# Patient Record
Sex: FEMALE | Race: WHITE | Employment: OTHER | RURAL
[De-identification: names, ages, dates, MRNs, and addresses within clinical notes are randomized per-mention and may not be internally consistent; named-entity substitution may affect disease eponyms.]

---

## 2017-01-03 RX ORDER — GLIPIZIDE 10 MG/1
10 TABLET ORAL 2 TIMES DAILY
Qty: 60 TAB | Refills: 3 | Status: CANCELLED | OUTPATIENT
Start: 2017-01-03

## 2017-01-04 ENCOUNTER — OFFICE VISIT (OUTPATIENT)
Dept: FAMILY MEDICINE CLINIC | Age: 72
End: 2017-01-04

## 2017-01-04 VITALS
OXYGEN SATURATION: 96 % | BODY MASS INDEX: 28.97 KG/M2 | SYSTOLIC BLOOD PRESSURE: 143 MMHG | RESPIRATION RATE: 16 BRPM | DIASTOLIC BLOOD PRESSURE: 70 MMHG | HEART RATE: 41 BPM | TEMPERATURE: 96.8 F | WEIGHT: 157.4 LBS | HEIGHT: 62 IN

## 2017-01-04 DIAGNOSIS — E11.9 TYPE 2 DIABETES MELLITUS WITHOUT COMPLICATION, WITHOUT LONG-TERM CURRENT USE OF INSULIN (HCC): ICD-10-CM

## 2017-01-04 RX ORDER — GLIPIZIDE 10 MG/1
10 TABLET ORAL 2 TIMES DAILY
Qty: 60 TAB | Refills: 3 | Status: SHIPPED | OUTPATIENT
Start: 2017-01-04 | End: 2017-04-04 | Stop reason: SDUPTHER

## 2017-01-04 NOTE — PATIENT INSTRUCTIONS
Diabetes Foot Health: Care Instructions  Your Care Instructions    When you have diabetes, your feet need extra care and attention. Diabetes can damage the nerve endings and blood vessels in your feet, making you less likely to notice when your feet are injured. Diabetes also limits your body's ability to fight infection and get blood to areas that need it. If you get a minor foot injury, it could become an ulcer or a serious infection. With good foot care, you can prevent most of these problems. Caring for your feet can be quick and easy. Most of the care can be done when you are bathing or getting ready for bed. Follow-up care is a key part of your treatment and safety. Be sure to make and go to all appointments, and call your doctor if you are having problems. Its also a good idea to know your test results and keep a list of the medicines you take. How can you care for yourself at home? · Keep your blood sugar close to normal by watching what and how much you eat, monitoring blood sugar, taking medicines if prescribed, and getting regular exercise. · Do not smoke. Smoking affects blood flow and can make foot problems worse. If you need help quitting, talk to your doctor about stop-smoking programs and medicines. These can increase your chances of quitting for good. · Eat a diet that is low in fats. High fat intake can cause fat to build up in your blood vessels and decrease blood flow. · Inspect your feet daily for blisters, cuts, cracks, or sores. If you cannot see well, use a mirror or have someone help you. · Take care of your feet:  [facility] your feet every day. Use warm (not hot) water. Check the water temperature with your wrists or other part of your body, not your feet. ¨ Dry your feet well. Pat them dry. Do not rub the skin on your feet too hard. Dry well between your toes. If the skin on your feet stays moist, bacteria or a fungus can grow, which can lead to infection.   ¨ Keep your skin soft. Use moisturizing skin cream to keep the skin on your feet soft and prevent calluses and cracks. But do not put the cream between your toes, and stop using any cream that causes a rash. ¨ Clean underneath your toenails carefully. Do not use a sharp object to clean underneath your toenails. Use the blunt end of a nail file or other rounded tool. ¨ Trim and file your toenails straight across to prevent ingrown toenails. Use a nail clipper, not scissors. Use an emery board to smooth the edges. · Change socks daily. Socks without seams are best, because seams often rub the feet. You can find socks for people with diabetes from specialty catalogs. · Look inside your shoes every day for things like gravel or torn linings, which could cause blisters or sores. · Buy shoes that fit well:  ¨ Look for shoes that have plenty of space around the toes. This helps prevent bunions and blisters. ¨ Try on shoes while wearing the kind of socks you will usually wear with the shoes. ¨ Avoid plastic shoes. They may rub your feet and cause blisters. Good shoes should be made of materials that are flexible and breathable, such as leather or cloth. ¨ Break in new shoes slowly by wearing them for no more than an hour a day for several days. Take extra time to check your feet for red areas, blisters, or other problems after you wear new shoes. · Do not go barefoot. Do not wear sandals, and do not wear shoes with very thin soles. Thin soles are easy to puncture. They also do not protect your feet from hot pavement or cold weather. · Have your doctor check your feet during each visit. If you have a foot problem, see your doctor. Do not try to treat an early foot problem at home. Home remedies or treatments that you can buy without a prescription (such as corn removers) can be harmful. · Always get early treatment for foot problems. A minor irritation can lead to a major problem if not properly cared for early.   When should you call for help? Call your doctor now or seek immediate medical care if:  · You have a foot sore, an ulcer or break in the skin that is not healing after 4 days, bleeding corns or calluses, or an ingrown toenail. · You have blue or black areas, which can mean bruising or blood flow problems. · You have peeling skin or tiny blisters between your toes or cracking or oozing of the skin. · You have a fever for more than 24 hours and a foot sore. · You have new numbness or tingling in your feet that does not go away after you move your feet or change positions. · You have unexplained or unusual swelling of the foot or ankle. Watch closely for changes in your health, and be sure to contact your doctor if:  · You cannot do proper foot care. Where can you learn more? Go to http://kamaljit-quan.info/. Enter A739 in the search box to learn more about \"Diabetes Foot Health: Care Instructions. \"  Current as of: May 23, 2016  Content Version: 11.1  © 6058-8719 [employer]. Care instructions adapted under license by [employer] (which disclaims liability or warranty for this information). If you have questions about a medical condition or this instruction, always ask your healthcare professional. [address] any warranty or liability for your use of this information. Learning About Diabetes and Your Teeth  How does diabetes affect your teeth and gums? When you have diabetes, managing blood sugar levels and taking good care of your teeth and gums are both important. When blood sugar levels are high, there's a greater risk for:  · Gum (periodontal) disease. · Tooth decay. · Fungal infections in the mouth, like thrush. · Dry mouth, or xerostomia (say \"zee-ruh-STO-cedrick-uh\"). The mouth needs saliva to neutralize the acids in your mouth. These acids can lead to gum disease and tooth decay.   Keeping your blood sugar levels in your target range can help prevent problems with the teeth and gums. If you have any problems with your teeth or gums, see your dentist.  How do you care for your teeth and gums when you have diabetes? · Brush your teeth twice a day. · Floss daily. Make sure to press the floss against your teeth and not your gums. · Check each day for areas where your gums might be red or painful. Be sure to let your dentist know of any sores in your mouth. · See your dentist regularly for professional cleaning of your teeth and to look for gum problems. Many dentists recommend getting checkups twice a year. Remind your dentist that you have diabetes before any work is done. · Don't smoke or use smokeless tobacco. Tobacco use with diabetes can lead to a greater risk of severe gum disease. If you need help quitting, talk to your doctor about stop-smoking programs and medicines. These can increase your chances of quitting for good. Follow-up care is a key part of your treatment and safety. Be sure to make and go to all appointments, and call your doctor if you are having problems. It's also a good idea to know your test results and keep a list of the medicines you take. Where can you learn more? Go to http://kamaljit-quan.info/. Enter O162 in the search box to learn more about \"Learning About Diabetes and Your Teeth. \"  Current as of: May 23, 2016  Content Version: 11.1  © 0073-7898 [employer]. Care instructions adapted under license by [employer] (which disclaims liability or warranty for this information). If you have questions about a medical condition or this instruction, always ask your healthcare professional. [address] any warranty or liability for your use of this information. Type 2 Diabetes: Care Instructions  Your Care Instructions  Type 2 diabetes is a disease that develops when the body's tissues cannot use insulin properly.  Over time, the pancreas cannot make enough insulin. Insulin is a hormone that helps the body's cells use sugar (glucose) for energy. It also helps the body store extra sugar in muscle, fat, and liver cells. Without insulin, the sugar cannot get into the cells to do its work. It stays in the blood instead. This can cause high blood sugar levels. A person has diabetes when the blood sugar stays too high too much of the time. Over time, diabetes can lead to diseases of the heart, blood vessels, nerves, kidneys, and eyes. You may be able to control your blood sugar by losing weight, eating a healthy diet, and getting daily exercise. You may also have to take insulin or other diabetes medicine. Follow-up care is a key part of your treatment and safety. Be sure to make and go to all appointments. Call your doctor if you are having problems. It's also a good idea to know your test results and keep a list of the medicines you take. How can you care for yourself at home? · Keep your blood sugar at a target level (which you set with your doctor). ¨ Eat a good diet that spreads carbohydrate throughout the day. Carbohydrate--the body's main source of fuel--affects blood sugar more than any other nutrient. Carbohydrate is in fruits, vegetables, milk, and yogurt. It also is in breads, cereals, vegetables such as potatoes and corn, and sugary foods such as candy and cakes. ¨ Aim for 30 minutes of exercise on most, preferably all, days of the week. Walking is a good choice. You also may want to do other activities, such as running, swimming, cycling, or playing tennis or team sports. If your doctor says it's okay, do muscle-strengthening exercises at least 2 times a week. ¨ Take your medicines exactly as prescribed. Call your doctor if you think you are having a problem with your medicine. You will get more details on the specific medicines your doctor prescribes. · Check your blood sugar as often as your doctor recommends.  It is important to keep track of any symptoms you have, such as low blood sugar. Also tell your doctor if you have any changes in your activities, diet, or insulin use. · Talk to your doctor before you start taking aspirin every day. Aspirin can help certain people lower their risk of a heart attack or stroke. But taking aspirin isn't right for everyone, because it can cause serious bleeding. · Do not smoke. If you need help quitting, talk to your doctor about stop-smoking programs and medicines. These can increase your chances of quitting for good. · Keep your cholesterol and blood pressure at normal levels. You may need to take one or more medicines to reach your goals. Take them exactly as directed. Do not stop or change a medicine without talking to your doctor first.  When should you call for help? Call 911 anytime you think you may need emergency care. For example, call if:  · You passed out (lost consciousness), or you suddenly become very sleepy or confused. (You may have very low blood sugar.)  Call your doctor now or seek immediate medical care if:  · Your blood sugar is 300 mg/dL or is higher than the level your doctor has set for you. · You have symptoms of low blood sugar, such as:  ¨ Sweating. ¨ Feeling nervous, shaky, and weak. ¨ Extreme hunger and slight nausea. ¨ Dizziness and headache. ¨ Blurred vision. ¨ Confusion. Watch closely for changes in your health, and be sure to contact your doctor if:  · You often have problems controlling your blood sugar. · You have symptoms of long-term diabetes problems, such as:  ¨ New vision changes. ¨ New pain, numbness, or tingling in your hands or feet. ¨ Skin problems. Where can you learn more? Go to http://kamaljit-quan.info/. Enter C553 in the search box to learn more about \"Type 2 Diabetes: Care Instructions. \"  Current as of: May 23, 2016  Content Version: 11.1  © 7679-2023 [employer].  Care instructions adapted under license by [employer] (which disclaims liability or warranty for this information). If you have questions about a medical condition or this instruction, always ask your healthcare professional. [address] any warranty or liability for your use of this information.

## 2017-01-04 NOTE — PROGRESS NOTES
José Miguel Butler is a 73 y.o. female who presents to the office today with the following:  Chief Complaint   Patient presents with    Medication Refill     est care and get refills       HPI   Here requesting refills for Januvia and Glipizide for T2DM, never on insulin. Checks BS twice daily. Reports FBS typically run in 125-150s. Pt states only up to 140-150s when she has a \"cheat\" food, such as bread or pie. Tolerating medications well with no reported SEs or hypoglycemic episodes. Otherwise reports feeling well today with no complaints. Due for A1c and foot exam.    Pt states she is in a hurry and does not have time for anything else. Needs her refills and needs to leave. Agrees to return soon for labs and foot exam.    Declines all vaccines. States she is allergic to \"all of them\". Also says she is never getting another colonoscopy. Does not intend on getting mammogram, DEXA, or any other screenings at all this year. Would like to discuss Opthalmology referral next visit. Review of Systems   Constitutional: Negative for fever, malaise/fatigue and weight loss. Eyes: Negative. Respiratory: Negative for cough and shortness of breath. Cardiovascular: Negative for chest pain and leg swelling. Gastrointestinal: Negative. Genitourinary: Negative. Skin: Negative for rash. Neurological: Negative. Negative for headaches. Allergies   Allergen Reactions    Codeine Hives and Shortness of Breath     Throat closes shut    Keflex [Cephalexin] Hives and Shortness of Breath     Throat closes shut    Pcn [Penicillins] Hives and Shortness of Breath     Throat closes shut    Ciprofloxacin Shortness of Breath    Morphine Angioedema     Throat swells       Current Outpatient Prescriptions   Medication Sig    JANUVIA 100 mg tablet TAKE 1 TABLET BY MOUTH DAILY    glipiZIDE (GLUCOTROL) 10 mg tablet Take 1 Tab by mouth two (2) times a day.     vit Z-N-[id number]-omega-3-ala-dha 250-3-50 unit,mg,unit chew Take  by mouth.  cholecalciferol (VITAMIN D3) 1,000 unit cap Take  by mouth daily.  ferrous sulfate 325 mg (65 mg iron) tablet Take  by mouth Daily (before breakfast).  vitamin E (AQUA GEMS) 400 unit capsule Take  by mouth daily.  ascorbic acid (VITAMIN C) 500 mg tablet Take  by mouth.  clotrimazole (LOTRIMIN) 1 % topical cream Apply  to affected area two (2) times a day. (Patient taking differently: Apply  to affected area two (2) times a day. PRN)    multivitamin (ONE A DAY) tablet Take 1 Tab by mouth daily.  cyanocobalamin (VITAMIN B-12) 1,000 mcg tablet Take 1,000 mcg by mouth daily.  nitrofurantoin, macrocrystal-monohydrate, (MACROBID) 100 mg capsule Take 1 Cap by mouth two (2) times a day.  metFORMIN (GLUCOPHAGE) 1,000 mg tablet TAKE 1 TABLET BY MOUTH TWICE DAILY FOR DIABETIS     No current facility-administered medications for this visit.         Past Medical History   Diagnosis Date    Diabetes ([facility] [address]     Proteinuria        Past Surgical History   Procedure Laterality Date    Hx orthopaedic  1979     back    Hx appendectomy      Hx cholecystectomy      Hx gyn       hysterectomy    Hx colonoscopy  2014     nl, q 2-3 y       Social History     Social History    Marital status: SINGLE     Spouse name: N/A    Number of children: N/A    Years of education: N/A     Social History Main Topics    Smoking status: Current Every Day Smoker     Types: Cigarettes    Smokeless tobacco: Never Used    Alcohol use Yes      Comment: rare    Drug use: No    Sexual activity: Not Currently     Partners: Male     Other Topics Concern    None     Social History Narrative       Family History   Problem Relation Age of Onset   Nolia Stamp COPD Mother     Asthma Mother     Heart Disease Father     Cancer Maternal Grandmother     Stroke Maternal Grandmother     Cancer Maternal Grandfather     Cancer Paternal Grandmother     Heart Disease Paternal Grandfather          Physical Exam:  Visit Vitals    /70 (BP 1 Location: Right arm, BP Patient Position: Sitting)    Pulse (!) 41    Temp 96.8 °F (36 °C) (Temporal)    Resp 16    Ht 5' 2\" (1.575 m)    Wt 157 lb 6.4 oz (71.4 kg)    SpO2 96%    BMI 28.79 kg/m2     Physical Exam   Constitutional: She is oriented to person, place, and time and well-developed, well-nourished, and in no distress. Somewhat disheveled WF. Smells of smoke. HENT:   Head: Normocephalic and atraumatic. Right Ear: External ear normal.   Left Ear: External ear normal.   Nose: Nose normal.   Mouth/Throat: Oropharynx is clear and moist.   Eyes: Conjunctivae and EOM are normal.   Neck: Normal range of motion. Neck supple. Cardiovascular: Regular rhythm, normal heart sounds and intact distal pulses. Bradycardia present. Pulmonary/Chest: Effort normal and breath sounds normal.   Musculoskeletal: She exhibits no edema. Lymphadenopathy:     She has no cervical adenopathy. Neurological: She is alert and oriented to person, place, and time. Gait normal.   Skin: Skin is warm and dry. Psychiatric: Mood and affect normal.   Nursing note and vitals reviewed. Assessment/Plan:    ICD-10-CM ICD-9-CM    1. Type 2 diabetes mellitus without complication, without long-term current use of insulin ([facility]) E11.9 250.00 SITagliptin (JANUVIA) 100 mg tablet      glipiZIDE (GLUCOTROL) 10 mg tablet     Pt to return as soon as able for visit, fasting for labs, foot exam, any other HM due. Cont to monitor BS. Also informed pt of low HR in office. Wanted to recheck in office but pt refused, again in a hurry and says she is not concerned. Encouraged hydration and try to recheck soon. Instructed may stop in clinic to have rechecked if able, even if cannot be seen. Explained potential causes from benign/hydration to life-threatening arrhythmia. Pt verbalizes understanding and states \"im fine don't worry\" and agrees to try to hydrate and recheck.     Follow-up Disposition:  Return in about 1 week (around 1/11/2017) for A1c, fasting labs, full physical.   Pt encouraged to return as soon as she is able. She is overdue for labs. Understands will not be able to continue to provide refills until she returns.     Tam Santos PA-C

## 2017-01-05 ENCOUNTER — CLINICAL SUPPORT (OUTPATIENT)
Dept: FAMILY MEDICINE CLINIC | Age: 72
End: 2017-01-05

## 2017-01-05 ENCOUNTER — TELEPHONE (OUTPATIENT)
Dept: FAMILY MEDICINE CLINIC | Age: 72
End: 2017-01-05

## 2017-01-05 VITALS — HEART RATE: 60 BPM | DIASTOLIC BLOOD PRESSURE: 70 MMHG | SYSTOLIC BLOOD PRESSURE: 150 MMHG

## 2017-01-05 DIAGNOSIS — Z01.30 BLOOD PRESSURE CHECK: Primary | ICD-10-CM

## 2017-01-05 NOTE — PROGRESS NOTES
Chief Complaint   Patient presents with    Blood Pressure Check     nurse visit     Patient stated went home yesterday and she took her own pulse and it was 62.   Anderson Pod, LPN

## 2017-01-05 NOTE — TELEPHONE ENCOUNTER
Per Nikhil Lerner request.  Call patient to see how she is doing. Patient had a low pulse yesterday and would like to know how the patient is doing and did she take her pulse? Tried to call patient. No answer. Left voice message for a return call.   Addie Reis LPN

## 2017-04-04 ENCOUNTER — OFFICE VISIT (OUTPATIENT)
Dept: FAMILY MEDICINE CLINIC | Age: 72
End: 2017-04-04

## 2017-04-04 VITALS
WEIGHT: 162 LBS | DIASTOLIC BLOOD PRESSURE: 50 MMHG | SYSTOLIC BLOOD PRESSURE: 175 MMHG | RESPIRATION RATE: 14 BRPM | BODY MASS INDEX: 29.81 KG/M2 | HEART RATE: 41 BPM | HEIGHT: 62 IN | TEMPERATURE: 97.2 F | OXYGEN SATURATION: 95 %

## 2017-04-04 DIAGNOSIS — I44.30 HEART BLOCK ATRIOVENTRICULAR: Primary | ICD-10-CM

## 2017-04-04 DIAGNOSIS — H53.2 BINOCULAR VISION DISORDER WITH DIPLOPIA: ICD-10-CM

## 2017-04-04 DIAGNOSIS — B35.3 TINEA PEDIS OF BOTH FEET: ICD-10-CM

## 2017-04-04 DIAGNOSIS — R00.1 BRADYCARDIA: ICD-10-CM

## 2017-04-04 DIAGNOSIS — Z13.29 THYROID DISORDER SCREENING: ICD-10-CM

## 2017-04-04 DIAGNOSIS — I10 BENIGN ESSENTIAL HTN: ICD-10-CM

## 2017-04-04 DIAGNOSIS — B35.1 ONYCHOMYCOSIS: ICD-10-CM

## 2017-04-04 DIAGNOSIS — E11.21 TYPE 2 DIABETES MELLITUS WITH DIABETIC NEPHROPATHY, WITHOUT LONG-TERM CURRENT USE OF INSULIN (HCC): ICD-10-CM

## 2017-04-04 RX ORDER — METFORMIN HYDROCHLORIDE 1000 MG/1
TABLET ORAL
Qty: 60 TAB | Refills: 3 | Status: SHIPPED | OUTPATIENT
Start: 2017-04-04 | End: 2017-04-05

## 2017-04-04 RX ORDER — CHLORPHENIRAMINE MALEATE 4 MG
TABLET ORAL 2 TIMES DAILY
Qty: 45 G | Refills: 3 | Status: SHIPPED | OUTPATIENT
Start: 2017-04-04 | End: 2017-07-20 | Stop reason: SDUPTHER

## 2017-04-04 RX ORDER — GLIPIZIDE 10 MG/1
10 TABLET ORAL 2 TIMES DAILY
Qty: 60 TAB | Refills: 3 | Status: SHIPPED | OUTPATIENT
Start: 2017-04-04 | End: 2017-06-08 | Stop reason: SDUPTHER
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
## 2017-04-04 NOTE — LETTER
4/11/2017 1:44 PM 
 
Ms. Luna Bowden [address] [id number] Dear Luna Slaughter: Please find your most recent results below. Resulted Orders HEMOGLOBIN A1C WITH EAG Result Value Ref Range Hemoglobin A1c 12.6 (H) 4.8 - 5.6 % Comment:  
            Pre-diabetes: 5.7 - 6.4 Diabetes: >6.4 Glycemic control for adults with diabetes: <7.0 Estimated average glucose 315 mg/dL Narrative Performed at:  [address]  [id number] : Dian Bird MD, Phone:  [phone] CBC WITH AUTOMATED DIFF Result Value Ref Range WBC 8.1 3.4 - 10.8 x10E3/uL  
 RBC 4.47 3.77 - 5.28 x10E6/uL HGB 13.4 11.1 - 15.9 g/dL HCT 41.0 34.0 - 46.6 % MCV 92 79 - 97 fL  
 MCH 30.0 26.6 - 33.0 pg  
 MCHC 32.7 31.5 - 35.7 g/dL  
 RDW 13.3 12.3 - 15.4 % PLATELET 726 668 - 363 x10E3/uL NEUTROPHILS 62 % Lymphocytes 26 % MONOCYTES 9 % EOSINOPHILS 2 % BASOPHILS 0 %  
 ABS. NEUTROPHILS 5.0 1.4 - 7.0 x10E3/uL Abs Lymphocytes 2.1 0.7 - 3.1 x10E3/uL  
 ABS. MONOCYTES 0.7 0.1 - 0.9 x10E3/uL  
 ABS. EOSINOPHILS 0.2 0.0 - 0.4 x10E3/uL  
 ABS. BASOPHILS 0.0 0.0 - 0.2 x10E3/uL IMMATURE GRANULOCYTES 1 %  
 ABS. IMM. GRANS. 0.1 0.0 - 0.1 x10E3/uL Narrative Performed at:  [address]  [id number] : Dian Bird MD, Phone:  [phone] METABOLIC PANEL, COMPREHENSIVE Result Value Ref Range Glucose 373 (H) 65 - 99 mg/dL BUN 24 8 - 27 mg/dL Creatinine 0.83 0.57 - 1.00 mg/dL GFR est non-AA 72 >59 mL/min/1.73 GFR est AA 83 >59 mL/min/1.73  
 BUN/Creatinine ratio 29 (H) 12 - 28 Comment: **Please note reference interval change** Sodium 140 134 - 144 mmol/L Potassium 4.7 3.5 - 5.2 mmol/L Chloride 102 96 - 106 mmol/L  
 CO2 22 18 - 29 mmol/L Calcium 9.6 8.7 - 10.3 mg/dL Protein, total 6.4 6.0 - 8.5 g/dL Albumin 3.6 3.6 - 4.8 g/dL GLOBULIN, TOTAL 2.8 1.5 - 4.5 g/dL A-G Ratio 1.3 1.2 - 2.2 Comment: **Please note reference interval change** Bilirubin, total <0.2 0.0 - 1.2 mg/dL Alk. phosphatase 90 39 - 117 IU/L  
 AST (SGOT) 8 0 - 40 IU/L  
 ALT (SGPT) 9 0 - 32 IU/L Narrative Performed at:  [address]  [id number] : Rafal Duque MD, Phone:  [phone] TSH 3RD GENERATION Result Value Ref Range TSH 4.420 0.450 - 4.500 uIU/mL Narrative Performed at:  [address]  [id number] : Rafal Duque MD, Phone:  [phone] SUGAR TESTING IS VERY HIGH NEED MONITOR SUGARS THREE TIMES A DAY AND BRING READINGS IN OR CALL THEM IN TO US TO ADJUST MEDICATION BUT CHANGES NEED TO BE MADE TO BRING YOUR SUGARS DOWN. Please call me if you have any questions: [phone] Sincerely, Adolph Daniels PA-C

## 2017-04-04 NOTE — PATIENT INSTRUCTIONS
Heart Blocks: Care Instructions  Your Care Instructions    A heart block is a problem with your heart's electrical system. Normally, a small area of the heart (sinus node) creates the electrical signals that cause the heart to beat in a timed and regular way. A heart block occurs when the signal is blocked. This disrupts the heartbeat. A heart block does not mean that blood flow to the heart is blocked. There are three types of heart blocks. In a first-degree heart block, the signal is slower than normal. But the heart rate is normal, and the heart usually is not damaged. Some medicines may cause a first-degree heart block. In a second-degree heart block, some signals do not reach the lower chambers of the heart. This can cause the heart to skip a beat or have an abnormal rhythm. In a third-degree heart block, the signal is completely blocked from reaching the lower chambers. This can cause the heart to slow down a lot or even stop beating. It is a very serious condition. A first-degree heart block usually does not cause problems and does not need treatment. Second- and third-degree heart blocks can be treated by placing a pacemaker under your skin. The pacemaker is connected to your heart with thin wires. It helps your heart to beat in an even rhythm. Follow-up care is a key part of your treatment and safety. Be sure to make and go to all appointments, and call your doctor if you are having problems. It's also a good idea to know your test results and keep a list of the medicines you take. How can you care for yourself at home? · If you feel lightheaded, sit or lie down to avoid injury that might occur if you faint and fall. · Get plenty of sleep and rest. If you get overtired, your changes in heart rate or rhythm may be more severe or occur more often. · If you received a pacemaker or an implantable cardioverter-defibrillator (ICD), you will get more information about it.   · Wear medical alert jewelry that describes your condition and says you have a pacemaker or ICD. You can buy this at most drugstores. When should you call for help? Call 911 anytime you think you may need emergency care. For example, call if:  · You passed out (lost consciousness). · You have symptoms of a heart attack. These may include:  ¨ Chest pain or pressure, or a strange feeling in the chest.  ¨ Sweating. ¨ Shortness of breath. ¨ Nausea or vomiting. ¨ Pain, pressure, or a strange feeling in the back, neck, jaw, or upper belly or in one or both shoulders or arms. ¨ Lightheadedness or sudden weakness. ¨ A fast or irregular heartbeat. After you call 911, the  may tell you to chew 1 adult-strength or 2 to 4 low-dose aspirin. Wait for an ambulance. Do not try to drive yourself. Call your doctor now or seek immediate medical care if:  · You are dizzy or lightheaded, or you feel like you may faint. · You have new or increased shortness of breath. Watch closely for changes in your health, and be sure to contact your doctor if you have any problems. Where can you learn more? Go to http://kamaljit-quan.info/. Enter I740 in the search box to learn more about \"Heart Blocks: Care Instructions. \"  Current as of: January 27, 2016  Content Version: 11.2  © 0283-3294 [employer]. Care instructions adapted under license by [employer] (which disclaims liability or warranty for this information). If you have questions about a medical condition or this instruction, always ask your healthcare professional. [address] any warranty or liability for your use of this information. Learning About Diabetes Food Guidelines  Your Care Instructions  Meal planning is important to manage diabetes. It helps keep your blood sugar at a target level (which you set with your doctor). You don't have to eat special foods.  You can eat what your family eats, including sweets once in a while. But you do have to pay attention to how often you eat and how much you eat of certain foods. You may want to work with a dietitian or a certified diabetes educator (CDE) to help you plan meals and snacks. A dietitian or CDE can also help you lose weight if that is one of your goals. What should you know about eating carbs? Managing the amount of carbohydrate (carbs) you eat is an important part of healthy meals when you have diabetes. Carbohydrate is found in many foods. · Learn which foods have carbs. And learn the amounts of carbs in different foods. ¨ Bread, cereal, pasta, and rice have about 15 grams of carbs in a serving. A serving is 1 slice of bread (1 ounce), ½ cup of cooked cereal, or 1/3 cup of cooked pasta or rice. ¨ Fruits have 15 grams of carbs in a serving. A serving is 1 small fresh fruit, such as an apple or orange; ½ of a banana; ½ cup of cooked or canned fruit; ½ cup of fruit juice; 1 cup of melon or raspberries; or 2 tablespoons of dried fruit. ¨ Milk and no-sugar-added yogurt have 15 grams of carbs in a serving. A serving is 1 cup of milk or 2/3 cup of no-sugar-added yogurt. ¨ Starchy vegetables have 15 grams of carbs in a serving. A serving is ½ cup of mashed potatoes or sweet potato; 1 cup winter squash; ½ of a small baked potato; ½ cup of cooked beans; or ½ cup cooked corn or green peas. · Learn how much carbs to eat each day and at each meal. A dietitian or CDE can teach you how to keep track of the amount of carbs you eat. This is called carbohydrate counting. · If you are not sure how to count carbohydrate grams, use the Plate Method to plan meals. It is a good, quick way to make sure that you have a balanced meal. It also helps you spread carbs throughout the day. ¨ Divide your plate by types of foods.  Put non-starchy vegetables on half the plate, meat or other protein food on one-quarter of the plate, and a grain or starchy vegetable in the final quarter of the plate. To this you can add a small piece of fruit and 1 cup of milk or yogurt, depending on how many carbs you are supposed to eat at a meal.  · Try to eat about the same amount of carbs at each meal. Do not \"save up\" your daily allowance of carbs to eat at one meal.  · Proteins have very little or no carbs per serving. Examples of proteins are beef, chicken, turkey, fish, eggs, tofu, cheese, cottage cheese, and peanut butter. A serving size of meat is 3 ounces, which is about the size of a deck of cards. Examples of meat substitute serving sizes (equal to 1 ounce of meat) are 1/4 cup of cottage cheese, 1 egg, 1 tablespoon of peanut butter, and ½ cup of tofu. How can you eat out and still eat healthy? · Learn to estimate the serving sizes of foods that have carbohydrate. If you measure food at home, it will be easier to estimate the amount in a serving of restaurant food. · If the meal you order has too much carbohydrate (such as potatoes, corn, or baked beans), ask to have a low-carbohydrate food instead. Ask for a salad or green vegetables. · If you use insulin, check your blood sugar before and after eating out to help you plan how much to eat in the future. · If you eat more carbohydrate at a meal than you had planned, take a walk or do other exercise. This will help lower your blood sugar. What else should you know? · Limit saturated fat, such as the fat from meat and dairy products. This is a healthy choice because people who have diabetes are at higher risk of heart disease. So choose lean cuts of meat and nonfat or low-fat dairy products. Use olive or canola oil instead of butter or shortening when cooking. · Don't skip meals. Your blood sugar may drop too low if you skip meals and take insulin or certain medicines for diabetes. · Check with your doctor before you drink alcohol. Alcohol can cause your blood sugar to drop too low.  Alcohol can also cause a bad reaction if you take certain diabetes medicines. Follow-up care is a key part of your treatment and safety. Be sure to make and go to all appointments, and call your doctor if you are having problems. It's also a good idea to know your test results and keep a list of the medicines you take. Where can you learn more? Go to http://kamaljit-quan.info/. Enter J901 in the search box to learn more about \"Learning About Diabetes Food Guidelines. \"  Current as of: May 23, 2016  Content Version: 11.2  © 4421-3630 [employer]. Care instructions adapted under license by [employer] (which disclaims liability or warranty for this information). If you have questions about a medical condition or this instruction, always ask your healthcare professional. [address] any warranty or liability for your use of this information.

## 2017-04-04 NOTE — MR AVS SNAPSHOT
Visit Information Date & Time Provider Department Dept. Phone Encounter #  
 4/4/2017  2:30 PM Padma Coto PA-C [address] [id number] Upcoming Health Maintenance Date Due  
 GLAUCOMA SCREENING Q2Y 6/20/2012 MICROALBUMIN Q1 6/14/2017 LIPID PANEL Q1 6/14/2017 MEDICARE YEARLY EXAM 6/15/2017 HEMOGLOBIN A1C Q6M 7/4/2017 Pneumococcal 65+ Low/Medium Risk (2 of 2 - PPSV23) 1/4/2018 FOOT EXAM Q1 1/4/2018 BREAST CANCER SCRN MAMMOGRAM 1/4/2019 DTaP/Tdap/Td series (2 - Td) 1/4/2027 COLONOSCOPY 1/4/2027 Allergies as of 4/4/2017  Review Complete On: 4/4/2017 By: Padma Coto PA-C Severity Noted Reaction Type Reactions Codeine High 06/07/2014    Hives, Shortness of Breath Throat closes shut Keflex [Cephalexin] High 06/07/2014    Hives, Shortness of Breath Throat closes shut Pcn [Penicillins] High 06/07/2014    Hives, Shortness of Breath Throat closes shut Ciprofloxacin  06/18/2016    Shortness of Breath Morphine  01/04/2017    Angioedema Throat swells Current Immunizations  Never Reviewed No immunizations on file. Not reviewed this visit You Were Diagnosed With   
  
 Codes Comments Type 2 diabetes mellitus with diabetic nephropathy, without long-term current use of insulin (HCC)    -  Primary ICD-10-CM: E11.21 
ICD-9-CM: 250.40, 583.81 Benign essential HTN     ICD-10-CM: I10 
ICD-9-CM: 401.1 Tinea pedis of both feet     ICD-10-CM: B35.3 ICD-9-CM: 110.4 Onychomycosis     ICD-10-CM: B35.1 ICD-9-CM: 110.1 Bradycardia     ICD-10-CM: R00.1 ICD-9-CM: 427.89 Binocular vision disorder with diplopia     ICD-10-CM: H53.2 ICD-9-CM: 368. 2 Thyroid disorder screening     ICD-10-CM: Z13.29 ICD-9-CM: V77.0 Vitals BP Pulse Temp Resp Height(growth percentile) Weight(growth percentile)  175/50 (BP 1 Location: Right arm, BP Patient Position: Sitting) (!) 41 97.2 °F (36.2 °C) (Temporal) 14 5' 2\" (1.575 m) 162 lb (73.5 kg) SpO2 PF BMI OB Status Smoking Status 95% 41 L/min 29.63 kg/m2 Hysterectomy Current Every Day Smoker BMI and BSA Data Body Mass Index Body Surface Area  
 29.63 kg/m 2 1.79 m 2 Preferred Pharmacy Pharmacy Name Phone [facility]idel[facility] Saleh [address] [place] [address] [address] [phone] Your Updated Medication List  
  
   
This list is accurate as of: 4/4/17  4:18 PM.  Always use your most recent med list.  
  
  
  
  
 ascorbic acid (vitamin C) 500 mg tablet Commonly known as:  VITAMIN C Take  by mouth. cholecalciferol 1,000 unit Cap Commonly known as:  VITAMIN D3 Take  by mouth daily. clotrimazole 1 % topical cream  
Commonly known as:  Mildred Cruza Apply  to affected area two (2) times a day. PRN  
  
 ferrous sulfate 325 mg (65 mg iron) tablet Take  by mouth Daily (before breakfast). glipiZIDE 10 mg tablet Commonly known as:  Flako Sober Take 1 Tab by mouth two (2) times a day. metFORMIN 1,000 mg tablet Commonly known as:  GLUCOPHAGE  
TAKE 1 TABLET BY MOUTH TWICE DAILY FOR DIABETIS  
  
 multivitamin tablet Commonly known as:  ONE A DAY Take 1 Tab by mouth daily. nitrofurantoin (macrocrystal-monohydrate) 100 mg capsule Commonly known as:  MACROBID Take 1 Cap by mouth two (2) times a day. SITagliptin 100 mg tablet Commonly known as:  Freica ck TAKE 1 TABLET BY MOUTH DAILY  
  
 vit W-K-O5-E-omega-3-ala-dha 250-3-50 unit,mg,unit Chew Take  by mouth. VITAMIN B-12 1,000 mcg tablet Generic drug:  cyanocobalamin Take 1,000 mcg by mouth daily. vitamin E 400 unit capsule Commonly known as:  [address] Take  by mouth daily. Prescriptions Sent to Pharmacy Refills  
 clotrimazole (LOTRIMIN) 1 % topical cream 3 Sig: Apply  to affected area two (2) times a day. PRN  Class: Normal  
 Pharmacy: [address] [place] [address] Ph #: [phone] Route: Topical  
 SITagliptin (JANUVIA) 100 mg tablet 3 Sig: TAKE 1 TABLET BY MOUTH DAILY Class: Normal  
 Pharmacy: [address] [place] [address] Ph #: [phone]  
 glipiZIDE (GLUCOTROL) 10 mg tablet 3 Sig: Take 1 Tab by mouth two (2) times a day. Class: Normal  
 Pharmacy: [address] [place] [address] Ph #: [phone] Route: Oral  
 metFORMIN (GLUCOPHAGE) 1,000 mg tablet 3 Sig: TAKE 1 TABLET BY MOUTH TWICE DAILY FOR DIABETIS Class: Normal  
 Pharmacy: [address] [place] [address] Ph #: [phone] We Performed the Following AMB POC EKG ROUTINE W/ 12 LEADS, INTER & REP [05791 CPT(R)] CBC WITH AUTOMATED DIFF [57604 CPT(R)] HEMOGLOBIN A1C WITH EAG [40959 CPT(R)] METABOLIC PANEL, COMPREHENSIVE [08153 CPT(R)] KS COLLECTION VENOUS BLOOD,VENIPUNCTURE [id number] CPT(R)] KS HANDLG&/OR CONVEY OF SPEC FOR TR OFFICE TO LAB [01873 CPT(R)] TSH 3RD GENERATION [07458 CPT(R)] Patient Instructions Heart Blocks: Care Instructions Your Care Instructions A heart block is a problem with your heart's electrical system. Normally, a small area of the heart (sinus node) creates the electrical signals that cause the heart to beat in a timed and regular way. A heart block occurs when the signal is blocked. This disrupts the heartbeat. A heart block does not mean that blood flow to the heart is blocked. There are three types of heart blocks. In a first-degree heart block, the signal is slower than normal. But the heart rate is normal, and the heart usually is not damaged. Some medicines may cause a first-degree heart block.  
In a second-degree heart block, some signals do not reach the lower chambers of the heart. This can cause the heart to skip a beat or have an abnormal rhythm. In a third-degree heart block, the signal is completely blocked from reaching the lower chambers. This can cause the heart to slow down a lot or even stop beating. It is a very serious condition. A first-degree heart block usually does not cause problems and does not need treatment. Second- and third-degree heart blocks can be treated by placing a pacemaker under your skin. The pacemaker is connected to your heart with thin wires. It helps your heart to beat in an even rhythm. Follow-up care is a key part of your treatment and safety. Be sure to make and go to all appointments, and call your doctor if you are having problems. It's also a good idea to know your test results and keep a list of the medicines you take. How can you care for yourself at home? · If you feel lightheaded, sit or lie down to avoid injury that might occur if you faint and fall. · Get plenty of sleep and rest. If you get overtired, your changes in heart rate or rhythm may be more severe or occur more often. · If you received a pacemaker or an implantable cardioverter-defibrillator (ICD), you will get more information about it. · Wear medical alert jewelry that describes your condition and says you have a pacemaker or ICD. You can buy this at most drugsValant Medical Solutionses. When should you call for help? Call 911 anytime you think you may need emergency care. For example, call if: 
· You passed out (lost consciousness). · You have symptoms of a heart attack. These may include: ¨ Chest pain or pressure, or a strange feeling in the chest. 
¨ Sweating. ¨ Shortness of breath. ¨ Nausea or vomiting. ¨ Pain, pressure, or a strange feeling in the back, neck, jaw, or upper belly or in one or both shoulders or arms. ¨ Lightheadedness or sudden weakness. ¨ A fast or irregular heartbeat.  
After you call 911, the  may tell you to chew 1 adult-strength or 2 to 4 low-dose aspirin. Wait for an ambulance. Do not try to drive yourself. Call your doctor now or seek immediate medical care if: 
· You are dizzy or lightheaded, or you feel like you may faint. · You have new or increased shortness of breath. Watch closely for changes in your health, and be sure to contact your doctor if you have any problems. Where can you learn more? Go to http://kamaljit-quan.info/. Enter Y537 in the search box to learn more about \"Heart Blocks: Care Instructions. \" Current as of: January 27, 2016 Content Version: 11.2 © 9106-1997 [employer]. Care instructions adapted under license by [employer] (which disclaims liability or warranty for this information). If you have questions about a medical condition or this instruction, always ask your healthcare professional. [address] any warranty or liability for your use of this information. Learning About Diabetes Food Guidelines Your Care Instructions Meal planning is important to manage diabetes. It helps keep your blood sugar at a target level (which you set with your doctor). You don't have to eat special foods. You can eat what your family eats, including sweets once in a while. But you do have to pay attention to how often you eat and how much you eat of certain foods. You may want to work with a dietitian or a certified diabetes educator (CDE) to help you plan meals and snacks. A dietitian or CDE can also help you lose weight if that is one of your goals. What should you know about eating carbs? Managing the amount of carbohydrate (carbs) you eat is an important part of healthy meals when you have diabetes. Carbohydrate is found in many foods. · Learn which foods have carbs. And learn the amounts of carbs in different foods.  
¨ Bread, cereal, pasta, and rice have about 15 grams of carbs in a serving. A serving is 1 slice of bread (1 ounce), ½ cup of cooked cereal, or 1/3 cup of cooked pasta or rice. ¨ Fruits have 15 grams of carbs in a serving. A serving is 1 small fresh fruit, such as an apple or orange; ½ of a banana; ½ cup of cooked or canned fruit; ½ cup of fruit juice; 1 cup of melon or raspberries; or 2 tablespoons of dried fruit. ¨ Milk and no-sugar-added yogurt have 15 grams of carbs in a serving. A serving is 1 cup of milk or 2/3 cup of no-sugar-added yogurt. ¨ Starchy vegetables have 15 grams of carbs in a serving. A serving is ½ cup of mashed potatoes or sweet potato; 1 cup winter squash; ½ of a small baked potato; ½ cup of cooked beans; or ½ cup cooked corn or green peas. · Learn how much carbs to eat each day and at each meal. A dietitian or CDE can teach you how to keep track of the amount of carbs you eat. This is called carbohydrate counting. · If you are not sure how to count carbohydrate grams, use the Plate Method to plan meals. It is a good, quick way to make sure that you have a balanced meal. It also helps you spread carbs throughout the day. ¨ Divide your plate by types of foods. Put non-starchy vegetables on half the plate, meat or other protein food on one-quarter of the plate, and a grain or starchy vegetable in the final quarter of the plate. To this you can add a small piece of fruit and 1 cup of milk or yogurt, depending on how many carbs you are supposed to eat at a meal. 
· Try to eat about the same amount of carbs at each meal. Do not \"save up\" your daily allowance of carbs to eat at one meal. 
· Proteins have very little or no carbs per serving. Examples of proteins are beef, chicken, turkey, fish, eggs, tofu, cheese, cottage cheese, and peanut butter. A serving size of meat is 3 ounces, which is about the size of a deck of cards.  Examples of meat substitute serving sizes (equal to 1 ounce of meat) are 1/4 cup of cottage cheese, 1 egg, 1 tablespoon of peanut butter, and ½ cup of tofu. How can you eat out and still eat healthy? · Learn to estimate the serving sizes of foods that have carbohydrate. If you measure food at home, it will be easier to estimate the amount in a serving of restaurant food. · If the meal you order has too much carbohydrate (such as potatoes, corn, or baked beans), ask to have a low-carbohydrate food instead. Ask for a salad or green vegetables. · If you use insulin, check your blood sugar before and after eating out to help you plan how much to eat in the future. · If you eat more carbohydrate at a meal than you had planned, take a walk or do other exercise. This will help lower your blood sugar. What else should you know? · Limit saturated fat, such as the fat from meat and dairy products. This is a healthy choice because people who have diabetes are at higher risk of heart disease. So choose lean cuts of meat and nonfat or low-fat dairy products. Use olive or canola oil instead of butter or shortening when cooking. · Don't skip meals. Your blood sugar may drop too low if you skip meals and take insulin or certain medicines for diabetes. · Check with your doctor before you drink alcohol. Alcohol can cause your blood sugar to drop too low. Alcohol can also cause a bad reaction if you take certain diabetes medicines. Follow-up care is a key part of your treatment and safety. Be sure to make and go to all appointments, and call your doctor if you are having problems. It's also a good idea to know your test results and keep a list of the medicines you take. Where can you learn more? Go to http://kamaljit-quan.info/. Enter Y458 in the search box to learn more about \"Learning About Diabetes Food Guidelines. \" Current as of: May 23, 2016 Content Version: 11.2 © 5975-7302 [employer].  Care instructions adapted under license by [employer] (which disclaims liability or warranty for this information). If you have questions about a medical condition or this instruction, always ask your healthcare professional. [address] any warranty or liability for your use of this information. Introducing [facility]! [facility] introduces ObsEva patient portal. Now you can access parts of your medical record, email your doctor's office, and request medication refills online. 1. In your internet browser, go to [email or web address] 2. Click on the First Time User? Click Here link in the Sign In box. You will see the New Member Sign Up page. 3. Enter your ObsEva Access Code exactly as it appears below. You will not need to use this code after youve completed the sign-up process. If you do not sign up before the expiration date, you must request a new code. · ObsEva Access Code: [email or web address] Expires: 4/4/2017  4:40 PM 
 
4. Enter the last four digits of your Social Security Number (xxxx) and Date of Birth (mm/dd/yyyy) as indicated and click Submit. You will be taken to the next sign-up page. 5. Create a ObsEva ID. This will be your ObsEva login ID and cannot be changed, so think of one that is secure and easy to remember. 6. Create a ObsEva password. You can change your password at any time. 7. Enter your Password Reset Question and Answer. This can be used at a later time if you forget your password. 8. Enter your e-mail address. You will receive e-mail notification when new information is available in [address] 9. Click Sign Up. You can now view and download portions of your medical record. 10. Click the Download Summary menu link to download a portable copy of your medical information. If you have questions, please visit the Frequently Asked Questions section of the ObsEva website. Remember, ObsEva is NOT to be used for urgent needs. For medical emergencies, dial 911. Now available from your iPhone and Android! Please provide this summary of care documentation to your next provider. Your primary care clinician is listed as Stephanie Love. If you have any questions after today's visit, please call [phone].

## 2017-04-05 ENCOUNTER — APPOINTMENT (OUTPATIENT)
Dept: MRI IMAGING | Age: 72
End: 2017-04-05
Attending: EMERGENCY MEDICINE
Payer: MEDICARE

## 2017-04-05 ENCOUNTER — HOSPITAL ENCOUNTER (OUTPATIENT)
Age: 72
Setting detail: OBSERVATION
Discharge: HOME | End: 2017-04-06
Attending: EMERGENCY MEDICINE | Admitting: INTERNAL MEDICINE
Payer: MEDICARE

## 2017-04-05 DIAGNOSIS — I44.1 MOBITZ (TYPE) II ATRIOVENTRICULAR BLOCK: Primary | ICD-10-CM

## 2017-04-05 PROBLEM — Z72.0 TOBACCO ABUSE: Status: ACTIVE | Noted: 2017-04-05

## 2017-04-05 PROBLEM — H53.2 DIPLOPIA: Status: ACTIVE | Noted: 2017-04-05

## 2017-04-05 LAB
ALBUMIN SERPL-MCNC: 3.6 G/DL (ref 3.6–4.8)
ALBUMIN/GLOB SERPL: 1.3 {RATIO} (ref 1.2–2.2)
ALP SERPL-CCNC: 90 IU/L (ref 39–117)
ALT SERPL-CCNC: 9 IU/L (ref 0–32)
AST SERPL-CCNC: 8 IU/L (ref 0–40)
ATRIAL RATE: 43 BPM
BASOPHILS # BLD AUTO: 0 X10E3/UL (ref 0–0.2)
BASOPHILS NFR BLD AUTO: 0 %
BILIRUB SERPL-MCNC: <0.2 MG/DL (ref 0–1.2)
BUN SERPL-MCNC: 24 MG/DL (ref 8–27)
BUN/CREAT SERPL: 29 (ref 12–28)
CALCIUM SERPL-MCNC: 9.6 MG/DL (ref 8.7–10.3)
CALCULATED P AXIS, ECG09: 78 DEGREES
CALCULATED R AXIS, ECG10: 88 DEGREES
CALCULATED T AXIS, ECG11: 57 DEGREES
CHLORIDE SERPL-SCNC: 102 MMOL/L (ref 96–106)
CO2 SERPL-SCNC: 22 MMOL/L (ref 18–29)
CREAT SERPL-MCNC: 0.83 MG/DL (ref 0.57–1)
DIAGNOSIS, 93000: NORMAL
EOSINOPHIL # BLD AUTO: 0.2 X10E3/UL (ref 0–0.4)
EOSINOPHIL NFR BLD AUTO: 2 %
ERYTHROCYTE [DISTWIDTH] IN BLOOD BY AUTOMATED COUNT: 13.3 % (ref 12.3–15.4)
EST. AVERAGE GLUCOSE BLD GHB EST-MCNC: 315 MG/DL
GLOBULIN SER CALC-MCNC: 2.8 G/DL (ref 1.5–4.5)
GLUCOSE BLD STRIP.AUTO-MCNC: 303 MG/DL (ref 65–100)
GLUCOSE SERPL-MCNC: 373 MG/DL (ref 65–99)
HBA1C MFR BLD: 12.6 % (ref 4.8–5.6)
HCT VFR BLD AUTO: 41 % (ref 34–46.6)
HGB BLD-MCNC: 13.4 G/DL (ref 11.1–15.9)
IMM GRANULOCYTES # BLD: 0.1 X10E3/UL (ref 0–0.1)
IMM GRANULOCYTES NFR BLD: 1 %
LYMPHOCYTES # BLD AUTO: 2.1 X10E3/UL (ref 0.7–3.1)
LYMPHOCYTES NFR BLD AUTO: 26 %
MCH RBC QN AUTO: 30 PG (ref 26.6–33)
MCHC RBC AUTO-ENTMCNC: 32.7 G/DL (ref 31.5–35.7)
MCV RBC AUTO: 92 FL (ref 79–97)
MONOCYTES # BLD AUTO: 0.7 X10E3/UL (ref 0.1–0.9)
MONOCYTES NFR BLD AUTO: 9 %
NEUTROPHILS # BLD AUTO: 5 X10E3/UL (ref 1.4–7)
NEUTROPHILS NFR BLD AUTO: 62 %
P-R INTERVAL, ECG05: 172 MS
PLATELET # BLD AUTO: 250 X10E3/UL (ref 150–379)
POTASSIUM SERPL-SCNC: 4.7 MMOL/L (ref 3.5–5.2)
PROT SERPL-MCNC: 6.4 G/DL (ref 6–8.5)
Q-T INTERVAL, ECG07: 508 MS
QRS DURATION, ECG06: 128 MS
QTC CALCULATION (BEZET), ECG08: 429 MS
RBC # BLD AUTO: 4.47 X10E6/UL (ref 3.77–5.28)
SERVICE CMNT-IMP: ABNORMAL
SODIUM SERPL-SCNC: 140 MMOL/L (ref 134–144)
TSH SERPL DL<=0.005 MIU/L-ACNC: 4.42 UIU/ML (ref 0.45–4.5)
VENTRICULAR RATE, ECG03: 43 BPM
WBC # BLD AUTO: 8.1 X10E3/UL (ref 3.4–10.8)

## 2017-04-05 PROCEDURE — 70551 MRI BRAIN STEM W/O DYE: CPT

## 2017-04-05 PROCEDURE — 74011250637 HC RX REV CODE- 250/637: Performed by: NURSE PRACTITIONER

## 2017-04-05 PROCEDURE — 99218 HC RM OBSERVATION: CPT

## 2017-04-05 PROCEDURE — 82962 GLUCOSE BLOOD TEST: CPT

## 2017-04-05 PROCEDURE — 93005 ELECTROCARDIOGRAM TRACING: CPT

## 2017-04-05 PROCEDURE — 94762 N-INVAS EAR/PLS OXIMTRY CONT: CPT

## 2017-04-05 PROCEDURE — 99284 EMERGENCY DEPT VISIT MOD MDM: CPT

## 2017-04-05 RX ORDER — SODIUM CHLORIDE 0.9 % (FLUSH) 0.9 %
5-10 SYRINGE (ML) INJECTION EVERY 8 HOURS
Status: DISCONTINUED | OUTPATIENT
Start: 2017-04-05 | End: 2017-04-06 | Stop reason: HOSPADM

## 2017-04-05 RX ORDER — MAGNESIUM SULFATE 100 %
16 CRYSTALS MISCELLANEOUS AS NEEDED
Status: DISCONTINUED | OUTPATIENT
Start: 2017-04-05 | End: 2017-04-06 | Stop reason: HOSPADM

## 2017-04-05 RX ORDER — ACETAMINOPHEN 325 MG/1
650 TABLET ORAL
Status: DISCONTINUED | OUTPATIENT
Start: 2017-04-05 | End: 2017-04-06 | Stop reason: HOSPADM

## 2017-04-05 RX ORDER — DEXTROSE 50 % IN WATER (D50W) INTRAVENOUS SYRINGE
25 AS NEEDED
Status: DISCONTINUED | OUTPATIENT
Start: 2017-04-05 | End: 2017-04-06 | Stop reason: HOSPADM

## 2017-04-05 RX ORDER — CLINDAMYCIN PHOSPHATE 900 MG/50ML
900 INJECTION INTRAVENOUS
Status: DISCONTINUED | OUTPATIENT
Start: 2017-04-05 | End: 2017-04-06 | Stop reason: HOSPADM

## 2017-04-05 RX ORDER — DEXTROSE 25 % IN WATER 25 %
2.5 SYRINGE (ML) INTRAVENOUS AS NEEDED
Status: DISCONTINUED | OUTPATIENT
Start: 2017-04-05 | End: 2017-04-06 | Stop reason: HOSPADM

## 2017-04-05 RX ORDER — GLIPIZIDE 5 MG/1
10 TABLET ORAL 2 TIMES DAILY
Status: DISCONTINUED | OUTPATIENT
Start: 2017-04-05 | End: 2017-04-06 | Stop reason: HOSPADM

## 2017-04-05 RX ORDER — LANOLIN ALCOHOL/MO/W.PET/CERES
325 CREAM (GRAM) TOPICAL
Status: DISCONTINUED | OUTPATIENT
Start: 2017-04-06 | End: 2017-04-06 | Stop reason: HOSPADM

## 2017-04-05 RX ORDER — SODIUM CHLORIDE 0.9 % (FLUSH) 0.9 %
5-10 SYRINGE (ML) INJECTION AS NEEDED
Status: DISCONTINUED | OUTPATIENT
Start: 2017-04-05 | End: 2017-04-06 | Stop reason: HOSPADM

## 2017-04-05 RX ORDER — CHLORPHENIRAMINE MALEATE 4 MG
TABLET ORAL 2 TIMES DAILY
Status: DISCONTINUED | OUTPATIENT
Start: 2017-04-05 | End: 2017-04-06 | Stop reason: HOSPADM

## 2017-04-05 RX ORDER — CLINDAMYCIN PHOSPHATE 600 MG/50ML
600 INJECTION INTRAVENOUS
Status: DISCONTINUED | OUTPATIENT
Start: 2017-04-06 | End: 2017-04-05

## 2017-04-05 RX ADMIN — Medication 5 ML: at 22:00

## 2017-04-05 RX ADMIN — GLIPIZIDE 10 MG: 5 TABLET ORAL at 19:54

## 2017-04-05 NOTE — CONSULTS
Subjective:                [address], [place], [address] [facility]  [phone]    Date of  Admission: 4/5/2017 11:22 AM     Admission type:Emergency    Sd Donaldson is a 73 y.o. female sent to the ER for bradycardia. She was found to be in 2:1 heart block and I was asked to see her. She complains of double vision over the last two weeks. She has some increased sob. Patient Active Problem List    Diagnosis Date Noted    Mobitz (type) II atrioventricular block 04/05/2017    Tobacco abuse 04/05/2017    Benign essential HTN 06/14/2016    Vitamin D deficiency 06/14/2016    Fe deficiency anemia 06/14/2016    Proteinuria     [facility]       Malena Spicer PA-C  Past Medical History:   Diagnosis Date    Diabetes [facility])     Mobitz (type) II atrioventricular block 4/5/2017    Proteinuria     Tobacco abuse 4/5/2017      Past Surgical History:   Procedure Laterality Date    HX APPENDECTOMY      HX CHOLECYSTECTOMY      HX COLONOSCOPY  2014    nl, q 2-3 y    HX GYN      hysterectomy    HX ORTHOPAEDIC  1979    back     Allergies   Allergen Reactions    Codeine Hives and Shortness of Breath     Throat closes shut    Keflex [Cephalexin] Hives and Shortness of Breath     Throat closes shut    Pcn [Penicillins] Hives and Shortness of Breath     Throat closes shut    Ciprofloxacin Shortness of Breath    Morphine Angioedema     Throat swells     Social History   Substance Use Topics    Smoking status: Current Every Day Smoker     Packs/day: 1.00     Types: Cigarettes    Smokeless tobacco: Never Used    Alcohol use Yes      Comment: rare     Family History   Problem Relation Age of Onset    COPD Mother     Asthma Mother     Heart Disease Father     Cancer Maternal Grandmother     Stroke Maternal Grandmother     Cancer Maternal Grandfather     Cancer Paternal Grandmother     Heart Disease Paternal Grandfather       No current facility-administered medications for this encounter. Current Outpatient Prescriptions   Medication Sig    SITagliptin (JANUVIA) 100 mg tablet TAKE 1 TABLET BY MOUTH DAILY    glipiZIDE (GLUCOTROL) 10 mg tablet Take 1 Tab by mouth two (2) times a day.  clotrimazole (LOTRIMIN) 1 % topical cream Apply  to affected area two (2) times a day. PRN    vit O-M-Z4-E-omega-3-ala-dha 250-3-50 unit,mg,unit chew Take  by mouth.  cholecalciferol (VITAMIN D3) 1,000 unit cap Take  by mouth daily.  ferrous sulfate 325 mg (65 mg iron) tablet Take  by mouth Daily (before breakfast).  vitamin E (AQUA GEMS) 400 unit capsule Take  by mouth daily.  ascorbic acid (VITAMIN C) 500 mg tablet Take  by mouth.  multivitamin (ONE A DAY) tablet Take 1 Tab by mouth daily.  cyanocobalamin (VITAMIN B-12) 1,000 mcg tablet Take 1,000 mcg by mouth daily. Review of Symptoms:  Constitutional: negative  Eyes: double vision  Ears, nose, mouth, throat, and face: negative  Respiratory: sob  Cardiovascular: negative  Gastrointestinal: negative  Genitourinary: negative  Musculoskeletal: negative  Neurological: negative  Behvioral/Psych: negative  Endocrine: negative     Subjective:      Visit Vitals    BP (!) 129/99    Pulse (!) 48    Temp 98 °F (36.7 °C)    Resp 16    Ht 5' 3\" (1.6 m)    Wt 160 lb 7.9 oz (72.8 kg)    SpO2 95%    BMI 28.43 kg/m2       Physical Exam  Abdomen: soft, non-tender.    Extremities: extremities normal  Heart: regular and nahomi  Lungs: clear to auscultation bilaterally  Pulses: 2+ and symmetric    Cardiographics    Telemetry: 2:1 HB    ECG: nsr with 2:1 hb, rbbb    Echocardiogram: pending    Labs:  Recent Labs      04/04/17   1900  04/04/17   1536   WBC  8.3  8.1   HGB  12.6  13.4   HCT  36.4  41.0   PLT  229  250     Recent Labs      04/04/17   1900  04/04/17   1536   NA  143  140   K  4.5  4.7   CL  105  102   CO2  29  22   GLU  285*  373*   BUN  25*  24   CREA  0.93  0.83   CA  9.4  9.6   MG  1.7   --    ALB  3.3*  3.6   TBILI  0.2  <0.2   SGOT 11*  8   ALT  15  9       Recent Labs      04/04/17   1900   TROIQ  <0.04   CPK  36   CKMB  <1.0       No intake or output data in the 24 hours ending 04/05/17 1449      Assessment:     Assessment:       Active Problems:    Diabetes ([address] ()      Mobitz (type) II atrioventricular block (4/5/2017)      Tobacco abuse (4/5/2017)         Plan:     Otto Mina is a pleasant lady with 2:1 heart block. She is not on any av sd agents. She is a candidate for a dual chamber pacemaker. I discussed the risks/benefits/alternatives of the procedure with the patient. Risks include (but are not limited to) bleeding, heart block, infection, cva/mi/tamponade/death. The patient understands and agrees to proceed. Thank you for this interesting consultation.         Tesha Nobles MD, [facility]    4/5/2017

## 2017-04-05 NOTE — H&P
[address] [place] [address] [facility]  [phone]          CARDIOLOGY HISTORY AND PHYSICAL    Date of  Admission: 4/5/2017 11:22 AM     Admission type:Emergency     Subjective:      Yumiko Talavera is a 73 y.o. female admitted for 2nd degree Mobitz II HB. Seen in PCP office yesterday for c/o diplopia that started 3-4 weeks ago. During assessment HR noted at 41, ECG completed showing 2nd degree HB. Referred pt to [id number] [place] [address] yesterday, came today. Patient has no symptoms of bradycardia. Denies CP, SOB, dizziness/lightheadedness, syncope, palpitations, weakness, lethargy. Only c/o is the diplopia. No previous cardiac hx, +DM, tob abuse.   Cardiac risk factors: smoking/ tobacco exposure, diabetes mellitus, post-menopausal.    Patient Active Problem List    Diagnosis Date Noted    Second degree AV block 04/05/2017    Tobacco abuse 04/05/2017    Diplopia 04/05/2017    Benign essential HTN 06/14/2016    Vitamin D deficiency 06/14/2016    Fe deficiency anemia 06/14/2016    Proteinuria     Diabetes ([address]       Diane Casanova PA-C  Past Medical History:   Diagnosis Date    Diabetes ([address]     Diplopia 4/5/2017    Mobitz (type) II atrioventricular block 4/5/2017    Proteinuria     Tobacco abuse 4/5/2017      Social History     Social History    Marital status: SINGLE     Spouse name: N/A    Number of children: N/A    Years of education: N/A     Social History Main Topics    Smoking status: Current Every Day Smoker     Packs/day: 1.00     Types: Cigarettes    Smokeless tobacco: Never Used    Alcohol use Yes      Comment: rare    Drug use: No    Sexual activity: Not Currently     Partners: Male     Other Topics Concern    None     Social History Narrative     Allergies   Allergen Reactions    Codeine Hives and Shortness of Breath     Throat closes shut    Keflex [Cephalexin] Hives and Shortness of Breath     Throat closes shut    Pcn [Penicillins] Hives and Shortness of Breath Throat closes shut    Ciprofloxacin Shortness of Breath    Morphine Angioedema     Throat swells      Family History   Problem Relation Age of Onset    COPD Mother     Asthma Mother     Heart Disease Father     Cancer Maternal Grandmother     Stroke Maternal Grandmother     Cancer Maternal Grandfather     Cancer Paternal Grandmother     Heart Disease Paternal Grandfather       Current Facility-Administered Medications   Medication Dose Route Frequency    sodium chloride (NS) flush 5-10 mL  5-10 mL IntraVENous Q8H    sodium chloride (NS) flush 5-10 mL  5-10 mL IntraVENous PRN    acetaminophen (TYLENOL) tablet 650 mg  650 mg Oral Q4H PRN    clindamycin (CLEOCIN) 900mg D5W 50mL IVPB (premix)  900 mg IntraVENous ON CALL     Current Outpatient Prescriptions   Medication Sig    SITagliptin (JANUVIA) 100 mg tablet TAKE 1 TABLET BY MOUTH DAILY    glipiZIDE (GLUCOTROL) 10 mg tablet Take 1 Tab by mouth two (2) times a day.  clotrimazole (LOTRIMIN) 1 % topical cream Apply  to affected area two (2) times a day. PRN    vit S-D-N0-E-omega-3-ala-dha 250-3-50 unit,mg,unit chew Take  by mouth.  cholecalciferol (VITAMIN D3) 1,000 unit cap Take  by mouth daily.  ferrous sulfate 325 mg (65 mg iron) tablet Take  by mouth Daily (before breakfast).  vitamin E (AQUA GEMS) 400 unit capsule Take  by mouth daily.  ascorbic acid (VITAMIN C) 500 mg tablet Take  by mouth.  multivitamin (ONE A DAY) tablet Take 1 Tab by mouth daily.  cyanocobalamin (VITAMIN B-12) 1,000 mcg tablet Take 1,000 mcg by mouth daily.          Review of Symptoms:   Constitutional: negative  Eyes: negative  Ears, nose, mouth, throat, and face: blurred/double vision x 2-4 weeks  Respiratory: negative  Cardiovascular: negative  Gastrointestinal: negative  Genitourinary:negative  Musculoskeletal:negative  Neurological: negative  Behvioral/Psych: negative  Endocrine: negative     Objective:   Physical Exam:  General Appearance:  WNWD older looking than stated age  female in no acute distress  Chest:   Clear  Cardiovascular:  nahomi, 2/6 SONIA murmur, right carotid murmur vs stenosis  Abdomen:   Soft, non-tender, bowel sounds are active.   Extremities: no peripheral edema, +DP/PT  Skin:  Warm and dry, china ankles and feet with \"rash\", errhythemic, papular - patient states she gets every year and treats with topical solutions    Visit Vitals    BP (!) 129/99    Pulse (!) 48    Temp 98 °F (36.7 °C)    Resp 16    Ht 5' 3\" (1.6 m)    Wt 160 lb 7.9 oz (72.8 kg)    SpO2 95%    BMI 28.43 kg/m2       Cardiographics    Telemetry: SB 2nd degree HB 2:1  ECnd degree HB Mobitz 2,   Echocardiogram: pending    Labs:  Recent Labs      17   1900  17   1536   WBC  8.3  8.1   HGB  12.6  13.4   HCT  36.4  41.0   PLT  229  250     Recent Labs      17   1900  17   1536   NA  143  140   K  4.5  4.7   CL  105  102   CO2  29  22   GLU  285*  373*   BUN  25*  24   CREA  0.93  0.83   CA  9.4  9.6   MG  1.7   --    ALB  3.3*  3.6   TBILI  0.2  <0.2   SGOT  11*  8   ALT  15  9       Recent Labs      17   1900   TROIQ  <0.04   CPK  36   CKMB  <1.0           Assessment:       Active Problems:    Diabetes (HCC) ()      Second degree AV block (2017)      Tobacco abuse (2017)      Diplopia (2017)         Plan:     2:1 AV block. Even though \"asymptomatic\", pacemaker is indicated with HB. She is not and has not been on any AV sd blocking agents that could contribute. The patient is agreeable to proceed with pacemaker implant likely in AM.  Echo pending. Dr. Dana Clark consulted. Diplopia:  Not an acute onset, MRI today to evaluate for possible old CVA? ? Follow up with PCP, likely will need Opthamology and/or Neurology. DM:  Continue on current medications  HgbA1c 12. Refer back to PCP for further management. Pt seen and examined in details. Agree with NP A&P. D/w pt.      Linda Tilley MD

## 2017-04-05 NOTE — ED PROVIDER NOTES
HPI Comments: Maria Esther Swan is a 73 y.o. female with hx DM, who presents ambulatory to the ED c/o bradycardia x yesterday. She was seen by her PCP yesterday for diplopia. An EKG at that visit showed heart block, and she was sent to [facility] for admission and pacemaker placement. She refused admission, preferring to be seen at [facility] the following morning. She has never been evaluated by cardiologist. She has a hx of poorly controlled DM, with labs yesterday showing HbA1c 12.6, and TSH 5.54. She specifically denies chest pain, SOB, abdominal pain, palpitations. PCP: Tito Romero PA-C  Soc Hx: +tobacco, +EtOH    There are no other complaints, changes or physical findings at this time. The history is provided by the patient. Past Medical History:   Diagnosis Date    Diabetes ([address]     Proteinuria        Past Surgical History:   Procedure Laterality Date    HX APPENDECTOMY      HX CHOLECYSTECTOMY      HX COLONOSCOPY  2014    nl, q 2-3 y    HX GYN      hysterectomy    HX ORTHOPAEDIC  1979    back         Family History:   Problem Relation Age of Onset    COPD Mother     Asthma Mother     Heart Disease Father     Cancer Maternal Grandmother     Stroke Maternal Grandmother     Cancer Maternal Grandfather     Cancer Paternal Grandmother     Heart Disease Paternal Grandfather        Social History     Social History    Marital status: SINGLE     Spouse name: N/A    Number of children: N/A    Years of education: N/A     Occupational History    Not on file. Social History Main Topics    Smoking status: Current Every Day Smoker     Packs/day: 1.00     Types: Cigarettes    Smokeless tobacco: Never Used    Alcohol use Yes      Comment: rare    Drug use: No    Sexual activity: Not Currently     Partners: Male     Other Topics Concern    Not on file     Social History Narrative         ALLERGIES: Codeine; Keflex [cephalexin]; Pcn [penicillins];  Ciprofloxacin; and Morphine    Review of Systems   Constitutional: Negative for chills, diaphoresis, fever and unexpected weight change. HENT: Negative for rhinorrhea and sore throat. Eyes: Positive for visual disturbance. Negative for pain. Respiratory: Negative for shortness of breath, wheezing and stridor. Cardiovascular: Negative for chest pain, palpitations and leg swelling. +bradycardia   Gastrointestinal: Negative for abdominal pain, blood in stool, diarrhea, nausea and vomiting. Genitourinary: Negative for difficulty urinating, dysuria and flank pain. Musculoskeletal: Negative for back pain and neck stiffness. Skin: Negative for rash. Neurological: Negative for seizures, syncope, weakness, light-headedness and headaches. Psychiatric/Behavioral: Negative for confusion. All other systems reviewed and are negative. Patient Vitals for the past 12 hrs:   Temp Pulse Resp BP SpO2   04/05/17 1048 98 °F (36.7 °C) (!) 46 12 169/63 99 %            Physical Exam   Constitutional: She is oriented to person, place, and time. She appears well-developed and well-nourished. No distress. HENT:   Nose: Nose normal.   Mouth/Throat: Oropharynx is clear and moist. No oropharyngeal exudate. Eyes: Conjunctivae and EOM are normal. Pupils are equal, round, and reactive to light. Right eye exhibits no discharge. Left eye exhibits no discharge. No scleral icterus. No EOM palsy noted   Neck: Normal range of motion. Neck supple. No JVD present. Cardiovascular: Normal rate, regular rhythm, normal heart sounds and intact distal pulses. No murmur heard. Pulmonary/Chest: Effort normal and breath sounds normal. No stridor. No respiratory distress. She has no wheezes. She has no rales. Abdominal: Soft. Bowel sounds are normal. She exhibits no distension. There is no tenderness. There is no rebound and no guarding. Musculoskeletal: She exhibits no edema or tenderness.    Neurological: She is alert and oriented to person, place, and time. Skin: Skin is warm and dry. No rash noted. She is not diaphoretic. Psychiatric: She has a normal mood and affect. Nursing note and vitals reviewed. MDM  Number of Diagnoses or Management Options  Mobitz (type) II atrioventricular block:      Amount and/or Complexity of Data Reviewed  Tests in the radiology section of CPT®: ordered and reviewed  Tests in the medicine section of CPT®: ordered and reviewed  Review and summarize past medical records: yes  Discuss the patient with other providers: yes (Cardiology)  Independent visualization of images, tracings, or specimens: yes      ED Course       Procedures    Consult Note:  12:17 PM  Guille Hatch spoke with Dr. Werner Senior  Specialty: Cardiology  Discussed pts hx, disposition, and available diagnostic and imaging results. Reviewed care plans. Consultant agrees with plans as outlined. He will come to ED to evaluate pt. CONSULT NOTE:   12:21 PM  Rhonda Ritchie MD spoke with Hui Montoya RN,  Specialty: Cardiology  Discussed pt's hx, disposition, and available diagnostic and imaging results. Reviewed care plans. Consultant agrees with plans as outlined. She will see the pt in the ED.   Written by Bibi Rodriguez ED Scribe, as dictated by Rhonda Ritchie MD.    LABORATORY TESTS:  Recent Results (from the past 12 hour(s))   EKG, 12 LEAD, INITIAL    Collection Time: 04/05/17 10:52 AM   Result Value Ref Range    Ventricular Rate 43 BPM    Atrial Rate 43 BPM    P-R Interval 172 ms    QRS Duration 128 ms    Q-T Interval 508 ms    QTC Calculation (Bezet) 429 ms    Calculated P Axis 78 degrees    Calculated R Axis 88 degrees    Calculated T Axis 57 degrees    Diagnosis       Sinus rhythm  with 2nd degree AV block with 2:1 AV conduction  Right bundle branch block  Abnormal ECG      Clinical correlation needed  Confirmed by Dhaval Aponte ([id number]) on 4/5/2017 11:15:17 AM     GLUCOSE, POC    Collection Time: 04/05/17 6:20 PM   Result Value Ref Range    Glucose (POC) 303 (H) 65 - 100 mg/dL    Performed by Veronique Fenton        IMAGING RESULTS:  MRI BRAIN WO CONT   Final Result   EXAM: MRI BRAIN WO CONT     INDICATION: Admission for bradycardia and diplopia for more than a week     COMPARISON: None available in the [facility] system     TECHNIQUE: Sagittal T1; coronal T2; axial T1, T2, FLAIR, gradient-echo, and  diffusion weighted noncontrast MRI of the brain and orbits. Patient declined  contrast.     FINDINGS: No hydrocephalus. No mass effect or midline shift. No extra-axial  fluid collection. Minimal chronic microvascular ischemic disease for age. No  restricted diffusion to indicate acute infarct.      The major intracranial vascular flow-voids are patent. Medial temporal lobes are  symmetric. The midline structures, including the cervicomedullary junction, are  within normal limits.      Orbits are symmetric. Optic nerves are within normal limits. Extraocular muscles  are within normal limits. No evidence of orbital mass within the limitation of  no intravenous contrast.     IMPRESSION  IMPRESSION:  Minimal chronic microvascular ischemic disease. No acute infarct. Normal  noncontrast MRI of the orbits. MEDICATIONS GIVEN:  Medications   clotrimazole (LOTRIMIN) 1 % cream (not administered)   ferrous sulfate tablet 325 mg (not administered)   glipiZIDE (GLUCOTROL) tablet 10 mg (not administered)   . PHARMACY TO SUBSTITUTE PER PROTOCOL (not administered)   sodium chloride (NS) flush 5-10 mL (not administered)   sodium chloride (NS) flush 5-10 mL (not administered)   acetaminophen (TYLENOL) tablet 650 mg (not administered)   clindamycin (CLEOCIN) 900mg D5W 50mL IVPB (premix) (not administered)       IMPRESSION:  1. Mobitz (type) II atrioventricular block    2. Binocular horizontal diplopia    PLAN:  1. Admit pt to cardiology service.     Admission Note:  6:42 PM  Patient is being admitted to the hospital by  Dennis. The results of their tests and reasons for their admission have been discussed with them and available family. They convey agreement and understanding for the need to be admitted and for their admission diagnosis. Written by Bibi Rodriguez ED Scribe, as dictated by Rhonda Ritchie MD.    This note is prepared by Bibi Rodriguez acting as Scribe for hRonda Ritchie MD.    Rhonda Ritchie MD: The scribe's documentation has been prepared under my direction and personally reviewed by me in its entirety. I confirm that the note above accurately reflects all work, treatment, procedures, and medical decision making performed by me.

## 2017-04-05 NOTE — ED NOTES
Patient went to her doctors office for blurry vision. They sent her to other ER, found out that she has a heart block, and patient left AMA. Patient is back today for same issues. No distress noted. Will continue to monitor.

## 2017-04-05 NOTE — IP AVS SNAPSHOT
[address] [facility] 
[phone] Patient: Adeline Crockett MRN: [id number] You are allergic to the following Allergen Reactions Codeine Hives Shortness of Breath Throat closes shut Flowers Anaphylaxis  
 roses Keflex (Cephalexin) Hives Shortness of Breath Throat closes shut Pcn (Penicillins) Hives Shortness of Breath Throat closes shut Ciprofloxacin Shortness of Breath Morphine Angioedema Throat swells Adhesive Tape-Silicones Rash Recent Documentation Height Weight Breastfeeding? BMI OB Status Smoking Status 1.6 m 72.8 kg No 28.43 kg/m2 Hysterectomy Current Every Day Smoker Emergency Contacts Name Discharge Info Relation Home Work Mobile Orlando Allen  Daughter [21] [phone] About your hospitalization You were admitted on:  April 5, 2017 You last received care in the:  [facility] 2 PROGRESSIVE CARE You were discharged on:  April 6, 2017 Unit phone number:  [phone] Why you were hospitalized Your primary diagnosis was:  Not on File Your diagnoses also included:  Second Degree Av Block, Diabetes (Hcc), Tobacco Abuse, Diplopia, S/P Cardiac Pacemaker Procedure Providers Seen During Your Hospitalizations Provider Role Specialty Primary office phone Terrell Acosta MD Attending Provider Emergency Medicine [phone] Mary Kate Barnes MD Attending Provider Cardiology [phone] Your Primary Care Physician (PCP) Primary Care Physician Office Phone Office Fax Mariah Sarmadrufus [phone] Follow-up Information Follow up With Details Comments Contact Info CHEKO Peñaloza [facility] [address] 
[phone]  Prem Schulz MD On 4/13/2017 at 1:00PM for pacemaker check [phone] [facility] 
[phone] Daniella Stein MD On 5/5/2017 at 10:00AM with [facility] [address] [id number] [phone] Your Appointments Thursday April 13, 2017  1:15 PM EDT  
PACEMAKER with PACEMAKER, [facility]) [address]  
[phone] Friday May 05, 2017 10:20 AM EDT New Patient with Daniella Stein MD  
[facility] [address] [place] [address] 435 [facility] [phone] Current Discharge Medication List  
  
START taking these medications Dose & Instructions Dispensing Information Comments Morning Noon Evening Bedtime  
 traMADol 50 mg tablet Commonly known as:  ULTRAM  
   
Your last dose was: Your next dose is:    
   
   
 Dose:  50 mg Take 1 Tab by mouth every six (6) hours as needed for Pain. Max Daily Amount: 200 mg. Quantity:  10 Tab Refills:  0 CONTINUE these medications which have NOT CHANGED Dose & Instructions Dispensing Information Comments Morning Noon Evening Bedtime  
 ascorbic acid (vitamin C) 500 mg tablet Commonly known as:  VITAMIN C Your last dose was: Your next dose is:    
   
   
 Dose:  500 mg Take 500 mg by mouth daily. Refills:  0  
     
   
   
   
  
 cholecalciferol 1,000 unit Cap Commonly known as:  VITAMIN D3 Your last dose was: Your next dose is:    
   
   
 Dose:  1000 Units Take 1,000 Units by mouth daily. Refills:  0  
     
   
   
   
  
 clotrimazole 1 % topical cream  
Commonly known as:  Jaden Brinnon Your last dose was: Your next dose is:    
   
   
 Apply  to affected area two (2) times a day. PRN Quantity:  45 g Refills:  3 ferrous sulfate 325 mg (65 mg iron) tablet Your last dose was: Your next dose is: Take  by mouth Daily (before breakfast). Refills:  0  
     
   
   
   
  
 glipiZIDE 10 mg tablet Commonly known as:  Jazlyn Sida Your last dose was: Your next dose is:    
   
   
 Dose:  10 mg Take 1 Tab by mouth two (2) times a day. Quantity:  60 Tab Refills:  3  
     
   
   
   
  
 multivitamin tablet Commonly known as:  ONE A DAY Your last dose was: Your next dose is:    
   
   
 Dose:  1 Tab Take 1 Tab by mouth daily. Refills:  0 SITagliptin 100 mg tablet Commonly known as:  Rosezena Au Your last dose was: Your next dose is: TAKE 1 TABLET BY MOUTH DAILY Quantity:  30 Tab Refills:  3  
     
   
   
   
  
 vit A-U-R3-E-omega-3-ala-dha 250-3-50 unit,mg,unit Chew Your last dose was: Your next dose is:    
   
   
 Dose:  1 Tab Take 1 Tab by mouth daily. Refills:  0  
     
   
   
   
  
 VITAMIN B-12 1,000 mcg tablet Generic drug:  cyanocobalamin Your last dose was: Your next dose is:    
   
   
 Dose:  1000 mcg Take 1,000 mcg by mouth daily. Refills:  0  
     
   
   
   
  
 vitamin E 400 unit capsule Commonly known as:  [address] Your last dose was: Your next dose is:    
   
   
 Dose:  400 Units Take 400 Units by mouth daily. Refills:  0 Where to Get Your Medications Information on where to get these meds will be given to you by the nurse or doctor. ! Ask your nurse or doctor about these medications  
  traMADol 50 mg tablet Discharge Instructions [address] [place], [place] [id number] ICD/PACEMAKER DISCHARGE INSTRUCTIONS Patient ID: Myra Galindo [id number] 
73 y.o. 
1945 Admit Date: 4/5/2017 Discharge Date: 4/6/2017 Admitting Physician: Jovita Conroy MD  
 
Discharge Physician: Tre Arzate MD 
 
  
 
Admission Diagnoses:  
2nd degree HB Discharge Diagnoses: Active Problems: 
  Diabetes ([address] () Overview: 4/5/17 HGBA1c 12.6 Second degree AV block (4/5/2017) Tobacco abuse (4/5/2017) Diplopia (4/5/2017) S/P cardiac pacemaker procedure (4/6/2017) Overview: 4/6/17 Medtronic dual chamber pacemaker implant Discharge Condition: Good Cardiology Procedures this Admission:  Medtronic dual chamber pacemaker implant Disposition: home Reference discharge instructions provided by nursing for diet and activity. Follow-up with Dr. Pato Valentin in 2 weeks. Please contact the office for an appointment at [phone]. Signed: 
Lamberto Farley NP 
4/6/2017 
8:24 AM 
 
DISCHARGE INSTRUCTIONS FOR PATIENTS WITH ICD'S AND PACEMAKERS 1. Carry you ID card for your ICD/Pacemaker with you at all times. This card will be given to you in the hospital or mailed to you. 2. Medic Alert Bracelets are available from your pharmacist to wear at all times. 3. Call for an appointment in 2 weeks [phone]. 4. The pacemaker will bulge slightly under your skin. An ICD bulges a little more because it is larger. The bulge will decrease in size over the next few weeks. Please notify the doctor's office if you notice any of the following around your ICD site: A.  A bruise that does not go away B. Soreness or yellow, green, or brown drainage from the site. C. Any swelling from the site. D. If you have a fever of 100 degrees or higher that lasts for a few days INCISION CARE 1.  Leave dressing over your site until you see the doctor. 2.  Leave steri-strips over your site until they start to fall off.  
3.   You may shower after as long as your incision isnt submerged or directly sprayed upon until well healed. 4.  For comfort, wear loose fitting clothing. 5.  Ice pack to affected shoulder for first 24 hours, wear your sling for 2 days. 6.  Report any signs of infection, fever, pain, swelling, redness, oozing, or heat at site especially if these symptoms increase after the first 3 to 4 days. ACTIVITY PRECAUTIONS 1. Avoid rough contact with the implant site. 2. No driving for 14 days. 3. Avoid lifting your arm over your head, carrying anything on the affected side, or lifting over 10 pounds for 30 days. For the first 2 days only bend your arm at the elbow. 4. Any extreme activity such as golf, weight lifting or exercise biking should be restricted for 60 days. 5. Do not carry objects by holding them against your implant site. 6.  No shooting rifles or any type of gun with the affected shoulder permanently. 7.  If you have an ICD, welding and chainsaws are prohibited. SPECIAL PRECAUTIONS 1. You should avoid all strong magnetic fields, such as arc welding, large transformers, large motors. Some ICD devices will beep if it detects a strong magnet. If this occurs, move out of the area. 2.  You may not have an MRI. 3.  Treatments or surgery that requires diathermy or electrocautery should be discussed with your doctor before scheduled. 4. Avoid radio frequency transmitters, including radar. 5. Advise dentist or other medical personnel you see that you have a pacemaker or ICD. 6.  Cell phones and microwave oven use is okay. 7.  If you plan to move or take a trip to a new area, the doctor's office will give you a name of a doctor to contact for any problems. SPECIAL INSTRUCTIONS ON SHOCKS 1. Notify your doctor for any of the following: A. Anytime a shock is received in a 24 hour period. An office visit is not usually required for a single shock. B.  Two or more shocks in a row.   If you do not feel well, call the Rescue Squad, otherwise call your doctor. This may require an office visit. C. Two or more shocks spaced apart by several hours. This may require an office visit. 2.  Keep a record of events. Include date, time, symptoms and activity at that time. ANTIBIOTIC THERAPY During the first 8 weeks after your pacemaker or ICD insertion, you may need antibiotics before any dental work or certain tests or operations. Let the dentist or doctor who is caring for you know that you have had an implanted device. Discharge Orders None Katango Announcement We are excited to announce that we are making your provider's discharge notes available to you in Katango. You will see these notes when they are completed and signed by the physician that discharged you from your recent hospital stay. If you have any questions or concerns about any information you see in Katango, please call the Health Information Department where you were seen or reach out to your Primary Care Provider for more information about your plan of care. Introducing [facility]! Niya Darby introduces Katango patient portal. Now you can access parts of your medical record, email your doctor's office, and request medication refills online. 1. In your internet browser, go to [email or web address] 2. Click on the First Time User? Click Here link in the Sign In box. You will see the New Member Sign Up page. 3. Enter your Katango Access Code exactly as it appears below. You will not need to use this code after youve completed the sign-up process. If you do not sign up before the expiration date, you must request a new code. · Katango Access Code: [email or web address] Expires: 7/3/2017  6:31 PM 
 
4. Enter the last four digits of your Social Security Number (xxxx) and Date of Birth (mm/dd/yyyy) as indicated and click Submit. You will be taken to the next sign-up page. 5. Create a [employer] Reports ID. This will be your R[employer] Reports login ID and cannot be changed, so think of one that is secure and easy to remember. 6. Create a [employer] password. You can change your password at any time. 7. Enter your Password Reset Question and Answer. This can be used at a later time if you forget your password. 8. Enter your e-mail address. You will receive e-mail notification when new information is available in [address] 9. Click Sign Up. You can now view and download portions of your medical record. 10. Click the Download Summary menu link to download a portable copy of your medical information. If you have questions, please visit the Frequently Asked Questions section of the [employer] website. Remember, [employer] Reports is NOT to be used for urgent needs. For medical emergencies, dial 911. Now available from your iPhone and Android! General Information Please provide this summary of care documentation to your next provider. Patient Signature:  ____________________________________________________________ Date:  ____________________________________________________________  
  
Dante Has Provider Signature:  ____________________________________________________________ Date:  ____________________________________________________________

## 2017-04-05 NOTE — IP AVS SNAPSHOT
Current Discharge Medication List  
  
START taking these medications Dose & Instructions Dispensing Information Comments Morning Noon Evening Bedtime  
 traMADol 50 mg tablet Commonly known as:  ULTRAM  
   
Your last dose was: Your next dose is:    
   
   
 Dose:  50 mg Take 1 Tab by mouth every six (6) hours as needed for Pain. Max Daily Amount: 200 mg. Quantity:  10 Tab Refills:  0 CONTINUE these medications which have NOT CHANGED Dose & Instructions Dispensing Information Comments Morning Noon Evening Bedtime  
 ascorbic acid (vitamin C) 500 mg tablet Commonly known as:  VITAMIN C Your last dose was: Your next dose is:    
   
   
 Dose:  500 mg Take 500 mg by mouth daily. Refills:  0  
     
   
   
   
  
 cholecalciferol 1,000 unit Cap Commonly known as:  VITAMIN D3 Your last dose was: Your next dose is:    
   
   
 Dose:  1000 Units Take 1,000 Units by mouth daily. Refills:  0  
     
   
   
   
  
 clotrimazole 1 % topical cream  
Commonly known as:  Cecilia Maryjane Your last dose was: Your next dose is:    
   
   
 Apply  to affected area two (2) times a day. PRN Quantity:  45 g Refills:  3  
     
   
   
   
  
 ferrous sulfate 325 mg (65 mg iron) tablet Your last dose was: Your next dose is: Take  by mouth Daily (before breakfast). Refills:  0  
     
   
   
   
  
 glipiZIDE 10 mg tablet Commonly known as:  Tripp Sanches Your last dose was: Your next dose is:    
   
   
 Dose:  10 mg Take 1 Tab by mouth two (2) times a day. Quantity:  60 Tab Refills:  3  
     
   
   
   
  
 multivitamin tablet Commonly known as:  ONE A DAY Your last dose was: Your next dose is:    
   
   
 Dose:  1 Tab Take 1 Tab by mouth daily. Refills:  0 SITagliptin 100 mg tablet Commonly known as:  Tramaine Robbinsstkatie Your last dose was: Your next dose is: TAKE 1 TABLET BY MOUTH DAILY Quantity:  30 Tab Refills:  3  
     
   
   
   
  
 vit R-Y-O7-E-omega-3-ala-dha 250-3-50 unit,mg,unit Chew Your last dose was: Your next dose is:    
   
   
 Dose:  1 Tab Take 1 Tab by mouth daily. Refills:  0  
     
   
   
   
  
 VITAMIN B-12 1,000 mcg tablet Generic drug:  cyanocobalamin Your last dose was: Your next dose is:    
   
   
 Dose:  1000 mcg Take 1,000 mcg by mouth daily. Refills:  0  
     
   
   
   
  
 vitamin E 400 unit capsule Commonly known as:  [address] Your last dose was: Your next dose is:    
   
   
 Dose:  400 Units Take 400 Units by mouth daily. Refills:  0 Where to Get Your Medications Information on where to get these meds will be given to you by the nurse or doctor. ! Ask your nurse or doctor about these medications  
  traMADol 50 mg tablet

## 2017-04-05 NOTE — ED NOTES
Assumed care of patient and verbal bedside report from Yolande Burger RN. Patient has no signs or symptoms of acute distress. Updated patient and family on plan of care and admission process. Informed patient she will be held in the ED overnight until an inpatient bed becomes available, verbalized understanding. Informed patient of vitals requirements while admitted, patient stated she did not want her blood pressure taken again. Informed patient we would need her vitals every 4 hours and we could discuss it again at that time if she is refusing now, verbalized understanding. Informed patient of blood glucose orders of QID (with meals and bedtime), patient initially states she was not having her glucose checked again tonight, however, after speaking to her about the importance of her health she stated she would be ok with having her glucose checked at 9 pm if she was the one who did it. I acknowledged that would be okay. Family at bedside, call bell in reach, monitor x 2, bed low.

## 2017-04-05 NOTE — PROGRESS NOTES
[facility]: MED RECONCILIATION    Comments/Recommendations:    Patient was alert and oriented; family in room   Allergies were verified with patient; reported adhesive tape and roses as new allergies not already documented   Patient takes many vitamin supplements, some duplicate therapy   Checks BS three times a day before meals each day; reports BS between 250-300 taken in morning last few days; BS less than 200 in afternoon and evening times    Medications added:     · None    Medications removed:    · None    Medications adjusted:    · Vitamin C dose added to reflect taking 1 tablet (500mg) daily  · Vitamin D3 missing dose (added 1000 units daily)  · Vitamin E missing dose (added 400 units daily)    Information obtained from: patient, Rx Query    Allergies: Codeine; Keflex [cephalexin]; Pcn [penicillins]; Ciprofloxacin; and Morphine    Prior to Admission Medications:   Prior to Admission Medications   Prescriptions Last Dose Informant Patient Reported? Taking? SITagliptin (JANUVIA) 100 mg tablet 4/5/2017 at am Self No Yes   Sig: TAKE 1 TABLET BY MOUTH DAILY   ascorbic acid (VITAMIN C) 500 mg tablet 4/5/2017 at am Self Yes Yes   Sig: Take 500 mg by mouth daily. cholecalciferol (VITAMIN D3) 1,000 unit cap 4/5/2017 at am Self Yes Yes   Sig: Take 1,000 Units by mouth daily. clotrimazole (LOTRIMIN) 1 % topical cream 3/29/2017 Self No Yes   Sig: Apply  to affected area two (2) times a day. PRN   cyanocobalamin (VITAMIN B-12) 1,000 mcg tablet 4/5/2017 at am Self Yes Yes   Sig: Take 1,000 mcg by mouth daily. ferrous sulfate 325 mg (65 mg iron) tablet 4/5/2017 at am Self Yes Yes   Sig: Take  by mouth Daily (before breakfast). glipiZIDE (GLUCOTROL) 10 mg tablet 4/5/2017 at am Self No Yes   Sig: Take 1 Tab by mouth two (2) times a day. multivitamin (ONE A DAY) tablet 4/5/2017 at am Self Yes Yes   Sig: Take 1 Tab by mouth daily.    vit P-A-G7-E-omega-3-ala-dha 250-3-50 unit,mg,unit chew 4/5/2017 at am Self Yes Yes Sig: Take 1 Tab by mouth daily. vitamin E (AQUA GEMS) 400 unit capsule 4/5/2017 at am Self Yes Yes   Sig: Take 400 Units by mouth daily.       Facility-Administered Medications: None     Thank you,  Lexy Choudhury  PharmD Candidate [id number]

## 2017-04-05 NOTE — PROGRESS NOTES

## 2017-04-05 NOTE — ED NOTES
Central pharmacy called about DM medication. Pharmacist informed this nurse that a hypoglycemia protocol needed to be in place prior to sending medication. Received verbal order from Dr. Dann Singleton to place hypoglycemia protocol. Pharmacist notified and will be tubing medication down.

## 2017-04-05 NOTE — ED NOTES
Pt waiting in a chair, in the hallway next to triage, for the next available ED room. Charge nurse aware.

## 2017-04-06 ENCOUNTER — APPOINTMENT (OUTPATIENT)
Dept: GENERAL RADIOLOGY | Age: 72
End: 2017-04-06
Attending: INTERNAL MEDICINE
Payer: MEDICARE

## 2017-04-06 VITALS
TEMPERATURE: 97.7 F | WEIGHT: 160.5 LBS | RESPIRATION RATE: 16 BRPM | BODY MASS INDEX: 28.44 KG/M2 | HEIGHT: 63 IN | OXYGEN SATURATION: 100 % | HEART RATE: 82 BPM | DIASTOLIC BLOOD PRESSURE: 68 MMHG | SYSTOLIC BLOOD PRESSURE: 181 MMHG

## 2017-04-06 PROBLEM — Z95.0 S/P CARDIAC PACEMAKER PROCEDURE: Status: ACTIVE | Noted: 2017-04-06

## 2017-04-06 LAB
ATRIAL RATE: 81 BPM
CALCULATED P AXIS, ECG09: 72 DEGREES
CALCULATED R AXIS, ECG10: 42 DEGREES
CALCULATED T AXIS, ECG11: 43 DEGREES
DIAGNOSIS, 93000: NORMAL
GLUCOSE BLD STRIP.AUTO-MCNC: 229 MG/DL (ref 65–100)
P-R INTERVAL, ECG05: 178 MS
Q-T INTERVAL, ECG07: 524 MS
QRS DURATION, ECG06: 136 MS
QTC CALCULATION (BEZET), ECG08: 432 MS
SERVICE CMNT-IMP: ABNORMAL
VENTRICULAR RATE, ECG03: 41 BPM

## 2017-04-06 PROCEDURE — 77030018729 HC ELECTRD DEFIB PAD CARD -B

## 2017-04-06 PROCEDURE — 74011250636 HC RX REV CODE- 250/636: Performed by: INTERNAL MEDICINE

## 2017-04-06 PROCEDURE — 99218 HC RM OBSERVATION: CPT

## 2017-04-06 PROCEDURE — 93306 TTE W/DOPPLER COMPLETE: CPT

## 2017-04-06 PROCEDURE — 93005 ELECTROCARDIOGRAM TRACING: CPT

## 2017-04-06 PROCEDURE — 33208 INSRT HEART PM ATRIAL & VENT: CPT

## 2017-04-06 PROCEDURE — 74011250637 HC RX REV CODE- 250/637: Performed by: NURSE PRACTITIONER

## 2017-04-06 PROCEDURE — 71010 XR CHEST PORT: CPT

## 2017-04-06 PROCEDURE — C1898 LEAD, PMKR, OTHER THAN TRANS: HCPCS

## 2017-04-06 PROCEDURE — 74011250636 HC RX REV CODE- 250/636

## 2017-04-06 PROCEDURE — 77030018836 HC SOL IRR NACL ICUM -A

## 2017-04-06 PROCEDURE — 77030011640 HC PAD GRND REM COVD -A

## 2017-04-06 PROCEDURE — 74011000250 HC RX REV CODE- 250

## 2017-04-06 PROCEDURE — C1894 INTRO/SHEATH, NON-LASER: HCPCS

## 2017-04-06 PROCEDURE — C1785 PMKR, DUAL, RATE-RESP: HCPCS

## 2017-04-06 PROCEDURE — L3670 SO ACRO/CLAV CAN WEB PRE OTS: HCPCS

## 2017-04-06 PROCEDURE — 82962 GLUCOSE BLOOD TEST: CPT

## 2017-04-06 PROCEDURE — C1892 INTRO/SHEATH,FIXED,PEEL-AWAY: HCPCS

## 2017-04-06 PROCEDURE — 77030031139 HC SUT VCRL2 J&J -A

## 2017-04-06 PROCEDURE — 77030002996 HC SUT SLK J&J -A

## 2017-04-06 PROCEDURE — 96374 THER/PROPH/DIAG INJ IV PUSH: CPT

## 2017-04-06 RX ORDER — LIDOCAINE HYDROCHLORIDE 10 MG/ML
INJECTION INFILTRATION; PERINEURAL
Status: COMPLETED
Start: 2017-04-06 | End: 2017-04-06

## 2017-04-06 RX ORDER — BACITRACIN 50000 [IU]/1
INJECTION, POWDER, FOR SOLUTION INTRAMUSCULAR
Status: COMPLETED
Start: 2017-04-06 | End: 2017-04-06

## 2017-04-06 RX ORDER — HYDRALAZINE HYDROCHLORIDE 20 MG/ML
10 INJECTION INTRAMUSCULAR; INTRAVENOUS ONCE
Status: COMPLETED | OUTPATIENT
Start: 2017-04-06 | End: 2017-04-06

## 2017-04-06 RX ORDER — FENTANYL CITRATE 50 UG/ML
INJECTION, SOLUTION INTRAMUSCULAR; INTRAVENOUS
Status: COMPLETED
Start: 2017-04-06 | End: 2017-04-06

## 2017-04-06 RX ORDER — BACITRACIN 50000 [IU]/1
50000 INJECTION, POWDER, FOR SOLUTION INTRAMUSCULAR ONCE
Status: CANCELLED | OUTPATIENT
Start: 2017-04-06 | End: 2017-04-06

## 2017-04-06 RX ORDER — LIDOCAINE HYDROCHLORIDE 10 MG/ML
1-40 INJECTION INFILTRATION; PERINEURAL
Status: CANCELLED | OUTPATIENT
Start: 2017-04-06

## 2017-04-06 RX ORDER — VANCOMYCIN HYDROCHLORIDE 1 G/20ML
INJECTION, POWDER, LYOPHILIZED, FOR SOLUTION INTRAVENOUS
Status: COMPLETED
Start: 2017-04-06 | End: 2017-04-06

## 2017-04-06 RX ORDER — HEPARIN SODIUM 200 [USP'U]/100ML
INJECTION, SOLUTION INTRAVENOUS
Status: COMPLETED
Start: 2017-04-06 | End: 2017-04-06

## 2017-04-06 RX ORDER — SODIUM CHLORIDE 0.9 % (FLUSH) 0.9 %
5-10 SYRINGE (ML) INJECTION EVERY 8 HOURS
Status: DISCONTINUED | OUTPATIENT
Start: 2017-04-06 | End: 2017-04-06 | Stop reason: HOSPADM

## 2017-04-06 RX ORDER — FENTANYL CITRATE 50 UG/ML
12.5-5 INJECTION, SOLUTION INTRAMUSCULAR; INTRAVENOUS
Status: CANCELLED | OUTPATIENT
Start: 2017-04-06

## 2017-04-06 RX ORDER — MIDAZOLAM HYDROCHLORIDE 1 MG/ML
INJECTION, SOLUTION INTRAMUSCULAR; INTRAVENOUS
Status: DISCONTINUED
Start: 2017-04-06 | End: 2017-04-06 | Stop reason: HOSPADM

## 2017-04-06 RX ORDER — HEPARIN SODIUM 200 [USP'U]/100ML
500 INJECTION, SOLUTION INTRAVENOUS ONCE
Status: CANCELLED | OUTPATIENT
Start: 2017-04-06 | End: 2017-04-06

## 2017-04-06 RX ORDER — SODIUM CHLORIDE 900 MG/100ML
INJECTION INTRAVENOUS
Status: DISCONTINUED
Start: 2017-04-06 | End: 2017-04-06 | Stop reason: HOSPADM

## 2017-04-06 RX ORDER — FENTANYL CITRATE 50 UG/ML
INJECTION, SOLUTION INTRAMUSCULAR; INTRAVENOUS
Status: DISCONTINUED
Start: 2017-04-06 | End: 2017-04-06 | Stop reason: HOSPADM

## 2017-04-06 RX ORDER — SODIUM CHLORIDE 0.9 % (FLUSH) 0.9 %
5-10 SYRINGE (ML) INJECTION AS NEEDED
Status: DISCONTINUED | OUTPATIENT
Start: 2017-04-06 | End: 2017-04-06 | Stop reason: HOSPADM

## 2017-04-06 RX ORDER — MIDAZOLAM HYDROCHLORIDE 1 MG/ML
1-5 INJECTION, SOLUTION INTRAMUSCULAR; INTRAVENOUS
Status: CANCELLED | OUTPATIENT
Start: 2017-04-06

## 2017-04-06 RX ORDER — MIDAZOLAM HYDROCHLORIDE 1 MG/ML
INJECTION, SOLUTION INTRAMUSCULAR; INTRAVENOUS
Status: COMPLETED
Start: 2017-04-06 | End: 2017-04-06

## 2017-04-06 RX ORDER — TRAMADOL HYDROCHLORIDE 50 MG/1
50 TABLET ORAL
Qty: 10 TAB | Refills: 0 | Status: SHIPPED | OUTPATIENT
Start: 2017-04-06 | End: 2017-05-05 | Stop reason: ALTCHOICE

## 2017-04-06 RX ORDER — NALOXONE HYDROCHLORIDE 0.4 MG/ML
0.4 INJECTION, SOLUTION INTRAMUSCULAR; INTRAVENOUS; SUBCUTANEOUS AS NEEDED
Status: DISCONTINUED | OUTPATIENT
Start: 2017-04-06 | End: 2017-04-06 | Stop reason: HOSPADM

## 2017-04-06 RX ADMIN — FENTANYL CITRATE 50 MCG: 50 INJECTION, SOLUTION INTRAMUSCULAR; INTRAVENOUS at 07:40

## 2017-04-06 RX ADMIN — MIDAZOLAM HYDROCHLORIDE 2 MG: 1 INJECTION INTRAMUSCULAR; INTRAVENOUS at 07:20

## 2017-04-06 RX ADMIN — GLIPIZIDE 10 MG: 5 TABLET ORAL at 09:06

## 2017-04-06 RX ADMIN — VANCOMYCIN HYDROCHLORIDE 1000 MG: 1 INJECTION, POWDER, LYOPHILIZED, FOR SOLUTION INTRAVENOUS at 07:21

## 2017-04-06 RX ADMIN — BACITRACIN 50000 UNITS: 50000 INJECTION, POWDER, FOR SOLUTION INTRAMUSCULAR at 08:12

## 2017-04-06 RX ADMIN — MIDAZOLAM HYDROCHLORIDE 2 MG: 1 INJECTION INTRAMUSCULAR; INTRAVENOUS at 08:05

## 2017-04-06 RX ADMIN — LIDOCAINE HYDROCHLORIDE 30 ML: 10 INJECTION, SOLUTION INFILTRATION; PERINEURAL at 07:40

## 2017-04-06 RX ADMIN — MIDAZOLAM HYDROCHLORIDE 2 MG: 1 INJECTION INTRAMUSCULAR; INTRAVENOUS at 07:45

## 2017-04-06 RX ADMIN — FENTANYL CITRATE 50 MCG: 50 INJECTION, SOLUTION INTRAMUSCULAR; INTRAVENOUS at 08:00

## 2017-04-06 RX ADMIN — HYDRALAZINE HYDROCHLORIDE 10 MG: 20 INJECTION INTRAMUSCULAR; INTRAVENOUS at 09:07

## 2017-04-06 RX ADMIN — Medication 10 ML: at 09:09

## 2017-04-06 RX ADMIN — FERROUS SULFATE TAB 325 MG (65 MG ELEMENTAL FE) 325 MG: 325 (65 FE) TAB at 09:07

## 2017-04-06 RX ADMIN — LINAGLIPTIN 5 MG: 5 TABLET, FILM COATED ORAL at 12:05

## 2017-04-06 RX ADMIN — MIDAZOLAM HYDROCHLORIDE 3 MG: 1 INJECTION INTRAMUSCULAR; INTRAVENOUS at 07:30

## 2017-04-06 RX ADMIN — FENTANYL CITRATE 50 MCG: 50 INJECTION, SOLUTION INTRAMUSCULAR; INTRAVENOUS at 07:25

## 2017-04-06 RX ADMIN — MIDAZOLAM HYDROCHLORIDE 3 MG: 1 INJECTION INTRAMUSCULAR; INTRAVENOUS at 08:00

## 2017-04-06 RX ADMIN — HEPARIN SODIUM 500 ML: 200 INJECTION, SOLUTION INTRAVENOUS at 07:39

## 2017-04-06 RX ADMIN — FENTANYL CITRATE 50 MCG: 50 INJECTION, SOLUTION INTRAMUSCULAR; INTRAVENOUS at 08:05

## 2017-04-06 NOTE — ED NOTES
TRANSFER - OUT REPORT:    Verbal report given to Zulema Bailey RN(name[facility] for routine progression of care       Report consisted of patients Situation, Background, Assessment and   Recommendations(SBAR). Information from the following report(s) SBAR, Kardex, ED Summary, Procedure Summary, Intake/Output, MAR, Recent Results and Cardiac Rhythm Bradycardia with RBBB and 2nd degree AV block was reviewed with the receiving nurse. Lines:   Peripheral IV 03/56/22 Right Basilic (Active)   Site Assessment Clean, dry, & intact 4/5/2017 12:00 PM   Phlebitis Assessment 0 4/5/2017 12:00 PM   Infiltration Assessment 0 4/5/2017 12:00 PM   Dressing Status Clean, dry, & intact 4/5/2017 12:00 PM   Hub Color/Line Status Blue 4/5/2017 12:00 PM        Opportunity for questions and clarification was provided.       Patient transported with:   Monitor  Tech

## 2017-04-06 NOTE — ED NOTES
Hourly rounding done. Patient resting quietly in position of comfort with call bell in reach. No signs or symptoms of acute distress noted at this time. No needs or wants verbalized at this time.

## 2017-04-06 NOTE — ROUTINE PROCESS
TRANSFER - IN REPORT:    Verbal report received from [facility]) on Otto Mina  being received from ED(unit) for routine progression of care      Report consisted of patients Situation, Background, Assessment and   Recommendations(SBAR). Information from the following report(s) Kardex, ED Summary and Recent Results was reviewed with the receiving nurse. Opportunity for questions and clarification was provided. Assessment completed upon patients arrival to unit and care assumed. Patient refusing to let nurse do a dual skin assessment and states she has no sores or open areas on her skin. Patient refusing to let nurse check her blood sugar or take a blood pressure in her arm. Blood pressure red high when taken in lower leg but patient is up siting on couch and refusing to get in bed right now. Admission database completed. Patient stated \" This is the last time your doing that and don't you dare wake me up once I am asleep because then I will not be able to go back to sleep. \"    2200: Patient in bed and has the bed raised in the air because at home her bed is high. She is refusing a bed alarm as well. Patient signed in formed refusal of bed alarm. 2300: Patient signed consent for pacemaker placement. Patient refusing to let nurse perform chlorahexadine scrub tonight. Stated it can be done in the morning. Patient also stated not to wake her up to do it she will call when she wakes up and then we can do chlorahexadine scrub and do the ECG. Supervisor made aware.

## 2017-04-06 NOTE — CARDIO/PULMONARY
C/P Rehab Note:    Patient is 73 y.o. female admitted 4/5/2017 for 2nd degree Mobitz  II HB.        PMH:     Diabetes ([address]      Diplopia 4/5/2017    Mobitz (type) II atrioventricular block 4/5/2017    Proteinuria      Tobacco abuse      Echo done 4/5/17. No EF listed in [facility] currently. Had permanent pacemaker placed today. Printed material given and discussed re: pacemakers, pacemaker discharge instructions and the TLC diet. Discussed post pacemaker instructions including: restrictions for the affected arm (no raising the arm above shoulder level, no heavy lifting for 30 days), monitoring for infection, avoiding impacts/pressure to the site, avoiding extreme activities, when to call the doctor, use of cell phones and microwaves and avoiding strong magnetic fields. Verbalized understanding.

## 2017-04-06 NOTE — PROCEDURES
[address] [place] [address]  [phone]    Indications and Pre-Procedure Diagnosis:  Mc Georges is a 73 y.o. female with 2:1 heart block is referred for dual chamber pacemaker. Post Procedure Diagnosis:    Mobitz II heart block    Pacemaker Implant Procedure and Findings:  Informed consent was obtained and the patient was premedicated with vancomycin. The procedure was performed under local anesthesia. Continuous pulse oximetry and cuff pressure were monitored. During the procedure, the patient received Versed and Fentanyl for sedation per nursing personnel. The left deltopectoral area was prepped and draped in the usual sterile fashion and was liberally infiltrated with 1% lidocaine. An incision was made over the left subpectoral area and a generator pocket was manually dissected. Access was achieved in the left axillary vein under fluoroscopic guidance and using the seldinger technique. Through the left axillary vein, pacing leads were positioned in appropriate regions in the right heart chambers where satisfactory pacing and sensing parameters were measured. Stability of the leads was assessed with deep breathing and there was no diaphragmatic pacing at 10V output. The leads were anchored using the sleeves and a pulse generator pocket fashioned using blunt dissection. The leads were then connected to the pulse generator. The pulse generator pocket was then liberally infiltrated with bacitracin solution, and the device implanted with a single silk fixation suture in the header to prevent migration. The wound was closed in layers using continuous 2-0 Vicryl and 4-0 Vicryl ending with a sub-cuticular closure. Fluoroscopy and total procedure times were 6 and 45 minutes respectively. Estimated blood loss <10 ml. Sharp count: correct. Specimen(s) collected: none. The following procedure related complication occurred: none. The following problems were encountered: none.  Findings: successful pacemaker placement. Device Data Measurements:  Lead Sensing (mV) Threshold (V)Pulse Width (ms) Impedance (Ohms)  RA 3.8  2.1  0.5   584  RV 6.1  0.8  0.5   735      Final Programmed Parameters  Bradycardia pacing rate  60 bpm  Pacing Mode    DDDR  Pacing Output    3.5 V@ 0.5 ms    Supplies Summary available in the chart  Medtronic    Thank you for allowing me to participate in this patients care.     Christine Gutierrez MD, [facility]

## 2017-04-06 NOTE — DISCHARGE SUMMARY
Cardiology Discharge Summary     Patient ID:  Chapo Anthony  [id number]  71 y.o.  1945    Admit Date: 4/5/2017    Discharge Date: 4/6/2017     Admitting Physician: Keenan Georges MD     Discharge Physician: Keenan Georges MD    Admission Diagnoses:   2nd degree HB    Discharge Diagnoses: Active Problems:    Diabetes ([address] ()      Second degree AV block (4/5/2017)      Tobacco abuse (4/5/2017)      Diplopia (4/5/2017)        Discharge Condition: Good    Cardiology Procedures this Admission:  permanent pacemaker    Hospital Course: presented with 2:1 block. Seen by EP. Underwent PPM. Stable. Consults: EP    Visit Vitals    /68    Pulse 82    Temp 97.7 °F (36.5 °C)    Resp 16    Ht 5' 3\" (1.6 m)    Wt 160 lb 7.9 oz (72.8 kg)    SpO2 100%    Breastfeeding No    BMI 28.43 kg/m2       Physical Exam  Abdomen: soft, non-tender. Bowel sounds normal.   Extremities: no cyanosis or edema  Heart: regular rate and rhythm, S1, S2 normal, no murmur, click, rub or gallop  Lungs: clear to auscultation bilaterally  Neurologic: Grossly normal    Labs:   Recent Labs      04/04/17   1900  04/04/17   1536   WBC  8.3  8.1   HGB  12.6  13.4   HCT  36.4  41.0   PLT  229  250     Recent Labs      04/04/17   1900  04/04/17   1536   NA  143  140   K  4.5  4.7   CL  105  102   CO2  29  22   GLU  285*  373*   BUN  25*  24   CREA  0.93  0.83   CA  9.4  9.6   MG  1.7   --    ALB  3.3*  3.6   TBILI  0.2  <0.2   SGOT  11*  8   ALT  15  9       Recent Labs      04/04/17 1900   TROIQ  <0.04   CPK  36   CKMB  <1.0     Disposition: home    Patient Instructions:   Current Discharge Medication List          Referenced discharge instructions provided by nursing for diet and activity. Signed:   Keenan Georges MD  4/6/2017  12:12 PM

## 2017-04-06 NOTE — DISCHARGE INSTRUCTIONS
[address] [place] [address]  [phone]        ICD/PACEMAKER DISCHARGE INSTRUCTIONS    Patient ID:  Nj Harvey  [id number]  11 y.o.  1945    Admit Date: 4/5/2017    Discharge Date: 4/6/2017     Admitting Physician: Joey Ferro MD     Discharge Physician: Renetta Ledesma MD         Admission Diagnoses:   2nd degree HB    Discharge Diagnoses: Active Problems:    Diabetes ([address] ()      Overview: 4/5/17 HGBA1c 12.6      Second degree AV block (4/5/2017)      Tobacco abuse (4/5/2017)      Diplopia (4/5/2017)      S/P cardiac pacemaker procedure (4/6/2017)      Overview: 4/6/17 Medtronic dual chamber pacemaker implant        Discharge Condition: Good    Cardiology Procedures this Admission:  Medtronic dual chamber pacemaker implant    Disposition: home    Reference discharge instructions provided by nursing for diet and activity. Follow-up with Dr. Francisco Gentile in 2 weeks. Please contact the office for an appointment at [phone]. Signed:  Juliet Banda NP  4/6/2017  8:24 AM    DISCHARGE INSTRUCTIONS FOR PATIENTS WITH ICD'S AND PACEMAKERS    1. Carry you ID card for your ICD/Pacemaker with you at all times. This card will be given to you in the hospital or mailed to you. 2. Medic Alert Bracelets are available from your pharmacist to wear at all times. 3. Call for an appointment in 2 weeks [phone]. 4. The pacemaker will bulge slightly under your skin. An ICD bulges a little more because it is larger. The bulge will decrease in size over the next few weeks. Please notify the doctor's office if you notice any of the following around your ICD site:  A.  A bruise that does not go away  B. Soreness or yellow, green, or brown drainage from the site. C. Any swelling from the site. D. If you have a fever of 100 degrees or higher that lasts for a few days    INCISION CARE       1.  Leave dressing over your site until you see the doctor.    2.  Leave steri-strips over your site until they start to fall off.   3.   You may shower after as long as your incision isnt submerged or directly sprayed upon until well healed. 4.  For comfort, wear loose fitting clothing. 5.  Ice pack to affected shoulder for first 24 hours, wear your sling for 2 days. 6.  Report any signs of infection, fever, pain, swelling, redness, oozing, or heat at site especially if these symptoms increase after the first 3 to 4 days. ACTIVITY PRECAUTIONS     1. Avoid rough contact with the implant site. 2. No driving for 14 days. 3. Avoid lifting your arm over your head, carrying anything on the affected side, or lifting over 10 pounds for 30 days. For the first 2 days only bend your arm at the elbow. 4. Any extreme activity such as golf, weight lifting or exercise biking should be restricted for 60 days. 5. Do not carry objects by holding them against your implant site. 6.  No shooting rifles or any type of gun with the affected shoulder permanently. 7.  If you have an ICD, welding and chainsaws are prohibited. SPECIAL PRECAUTIONS     1. You should avoid all strong magnetic fields, such as arc welding, large transformers, large motors. Some ICD devices will beep if it detects a strong magnet. If this occurs, move out of the area. 2.  You may not have an MRI. 3.  Treatments or surgery that requires diathermy or electrocautery should be discussed with your doctor before scheduled. 4. Avoid radio frequency transmitters, including radar. 5. Advise dentist or other medical personnel you see that you have a pacemaker or ICD. 6.  Cell phones and microwave oven use is okay. 7.  If you plan to move or take a trip to a new area, the doctor's office will give you a name of a doctor to contact for any problems. SPECIAL INSTRUCTIONS ON SHOCKS   1. Notify your doctor for any of the following:      A. Anytime a shock is received in a 24 hour period.   An office visit is not usually required for a single shock. B.  Two or more shocks in a row. If you do not feel well, call the [facility], otherwise call your doctor. This may require an office visit. C. Two or more shocks spaced apart by several hours. This may require an office visit. 2.  Keep a record of events. Include date, time, symptoms and activity at that time. ANTIBIOTIC THERAPY    During the first 8 weeks after your pacemaker or ICD insertion, you may need antibiotics before any dental work or certain tests or operations. Let the dentist or doctor who is caring for you know that you have had an implanted device.

## 2017-04-06 NOTE — PROGRESS NOTES
Failed attempt to meet with patient to present obs letter. Reviewed with RN . Patient returned from procedure and left.     Kai Andre RN CM  Ext [phone]

## 2017-04-06 NOTE — PROGRESS NOTES
1025 Pt demands to get up to go to the bathroom, found with bed elevated, feet over side of bed, sling and ice pack removed, and in the process of attempting to disconnect her IV. Pt instructed to wait for assistance from RN. Pt advised that due to recent pacemaker placement she should remain in bed and use bedpan. Pt states she has rods in her back and refuses bedpan, she states \"The doctor told me I am ok to get up and go to the bathroom when I feel ready and I am going to get up and go right now. \" Sling reapplied to left arm, IV remains intact and infusing. Pt refused assistance from nurse to sit up and instead insisted that she pull herself to sitting position using nurse's hand. Pt ambulated to bathroom, voided, and returned to bed. Pillows placed under left arm for support, sling left on, ice pack reapplied. Pt educated regarding risk for pace dislodgement and the required time to heal, pt states she knows. Pt's family at bedside state they are aware. Pt's bed left in low and locked position, but pt states \"I know how to raise it if I want to. \"

## 2017-04-06 NOTE — PROGRESS NOTES
PCU SHIFT NURSING NOTE        Shift Summary:   0700: Bedside shift change report given to Carmelina Guillen RN (oncoming nurse) by Soledad Hart RN (offgoing nurse). Report included the following information SBAR, Kardex, ED Summary, Procedure Summary, Intake/Output, MAR, Recent Results and Cardiac Rhythm Sinus Delaney Brink. 0715: Nurse off floor to have pacemake placed. 0830: Patient return to room. Report received on pacemaker placement. Patient informed she is on bedrest, and must keep her left are as still as possible. Patient complains of a minor pain at the incision site. Incision dressing it clean, dry, and intact with no drainage present and minimal edema. Patients blood pressure it elevated. Dr. Lacey Amaya in to see patient and discuss procedure summary and precautions. 0845: Dr. Connor Emerson in to see patient. He was updated on patients status and elevated BP. One dose of 10mg Hydralazine ordered per Dr. Connor Emerson. 1030: Per Cindy Haley RN, she found the patient with the head of the bed elevated and feet out of the bed. She was reminded that she was not able to get up post procedure. The patient refused the bed pan and demanded she was going to the bathroom. 1100: Incision site assess, dressing was clean, dry, and intact, with no drainage present. Patient was reminded on the importance of following the precautions. She was informed of possible outcomes if pacemaker wires become dislodged. Patient said should would not go threw the procedure again. Unsure how successful the patient will be following precautions after discharge. 1200: Helped patient get dressed and ready for discharge. 1315: Reviewed discharge instructions with patient.  Discussed follow-up appointments with patient and new medication perscriptions              Admission Date 4/5/2017   Admission Diagnosis 2nd degree HB   Consults IP CONSULT TO CARDIOLOGY        Consults   []PT   []OT   []Speech   []Case Management      [] Palliative      Cardiac Monitoring Order   [x]Yes   []No     IV drips   []Yes    Drip:                            Dose:  Drip:                            Dose:  Drip:                            Dose:   [x]No     GI Prophylaxis   []Yes   [x]No         DVT Prophylaxis   SCDs:             Gomez stockings:         [] Medication   []Contraindicated   [x]None      Activity Level Activity Level: Up ad chandan     Activity Assistance: No assistance needed   Purposeful Rounding every 1-2 hour? [x]Yes   Stephens Score  Total Score: 0   Bed Alarm (If score 3 or >)   []Yes   [x] Refused (See signed refusal form in chart)   Maximo Score  Maximo Score: 21   Maximo Score (if score 14 or less)   []PMT consult   []Wound Care consult      []Specialty bed   [] Nutrition consult          Needs prior to discharge:   Home O2 required:    []Yes   [x]No    If yes, how much O2 required?     Other:    Last Bowel Movement:        Influenza Vaccine Received Flu Vaccine for Current Season (usually Sept-March): No    Patient/Guardian Refused (Notify MD): Yes   Pneumonia Vaccine           Diet Active Orders   Diet    DIET CARDIAC Regular      LDAs               Peripheral IV 48/69/19 Right Basilic (Active)   Site Assessment Clean, dry, & intact 4/6/2017  5:28 AM   Phlebitis Assessment 0 4/6/2017  5:28 AM   Infiltration Assessment 0 4/6/2017  5:28 AM   Dressing Status Clean, dry, & intact 4/6/2017  5:28 AM   Dressing Type Transparent 4/6/2017  5:28 AM   Hub Color/Line Status Blue;Capped 4/6/2017  5:28 AM                      Urinary Catheter      Intake & Output   Date 04/05/17 0700 - 04/06/17 0659 04/06/17 0700 - 04/07/17 0659   Shift 0700-1859 1900-0659 24 Hour Total 0700-1859 1900-0659 24 Hour Total   I  N  T  A  K  E   Shift Total  (mL/kg)         O  U  T  P  U  T   Urine  (mL/kg/hr)            Urine Occurrence(s)    1 x  1 x    Shift Total  (mL/kg)         NET         Weight (kg) 72.8 72.8 72.8 72.8 72.8 72.8         Readmission Risk Assessment Tool Score Medium Risk            18 Total Score        3 Relationship with PCP    4 More than 1 Admission in calendar year    5 Patient Insurance is Medicare, Medicaid or Self Pay    6 Charlson Comorbidity Score        Criteria that do not apply:    Patient Living Status    Patient Length of Stay > 5       Expected Length of Stay - - -   Actual Length of Stay 0

## 2017-04-10 NOTE — PROGRESS NOTES
Notify pt her A1c is extremely high. I rec she start checking her sugars TID, keep a log and f/u in 2 wks (if she is able). CBC okay. Kidney and liver tests and thyroid wnl.

## 2017-04-10 NOTE — PROGRESS NOTES
Also really needs to avoid conc sweets, limit carbs/starches. Will need to adjust medication. If she has difficulty getting in soon (s/p recent surgery) we may be able to work out something over the phone, just let me know.

## 2017-04-13 ENCOUNTER — CLINICAL SUPPORT (OUTPATIENT)
Dept: CARDIOLOGY CLINIC | Age: 72
End: 2017-04-13

## 2017-04-13 DIAGNOSIS — Z95.0 S/P CARDIAC PACEMAKER PROCEDURE: Primary | ICD-10-CM

## 2017-04-13 DIAGNOSIS — I44.1 SECOND DEGREE AV BLOCK: ICD-10-CM

## 2017-04-13 DIAGNOSIS — I45.9 HEART BLOCK: ICD-10-CM

## 2017-04-13 DIAGNOSIS — R00.1 BRADYCARDIA: ICD-10-CM

## 2017-04-13 NOTE — PROGRESS NOTES
Juan Cerna  1945  Brunilda Ozuna PA-C          Subjective:    Patient is here for 2 week site check and device interrogation after pacemaker implantation on 4/6/17. The patient denies chest pain or shortness of breath. Pt had a procedure to her left arm before pacemaker implantation with swelling in left arm already present before device implantation.     Patient Active Problem List    Diagnosis Date Noted    S/P cardiac pacemaker procedure 04/06/2017    Second degree AV block 04/05/2017    Tobacco abuse 04/05/2017    Diplopia 04/05/2017    Benign essential HTN 06/14/2016    Vitamin D deficiency 06/14/2016    Fe deficiency anemia 06/14/2016    Proteinuria     [facility]       Past Medical History:   Diagnosis Date    Diabetes ([facility]     Diplopia 4/5/2017    Mobitz (type) II atrioventricular block 4/5/2017    Proteinuria     S/P cardiac pacemaker procedure 4/6/2017 4/6/17 Medtronic dual chamber pacemaker implant    Tobacco abuse 4/5/2017      Past Surgical History:   Procedure Laterality Date    HX APPENDECTOMY      HX CHOLECYSTECTOMY      HX COLONOSCOPY  2014    nl, q 2-3 y    HX GYN      hysterectomy    HX ORTHOPAEDIC  1979    back     Allergies   Allergen Reactions    Codeine Hives and Shortness of Breath     Throat closes shut    Flowers Anaphylaxis     roses    Keflex [Cephalexin] Hives and Shortness of Breath     Throat closes shut    Pcn [Penicillins] Hives and Shortness of Breath     Throat closes shut    Ciprofloxacin Shortness of Breath    Morphine Angioedema     Throat swells    Adhesive Tape-Silicones Rash      Family History   Problem Relation Age of Onset    COPD Mother     Asthma Mother     Heart Disease Father     Cancer Maternal Grandmother     Stroke Maternal Grandmother     Cancer Maternal Grandfather     Cancer Paternal Grandmother     Heart Disease Paternal Grandfather       Current Outpatient Prescriptions   Medication Sig    traMADol (ULTRAM) 50 mg tablet Take 1 Tab by mouth every six (6) hours as needed for Pain. Max Daily Amount: 200 mg.  clotrimazole (LOTRIMIN) 1 % topical cream Apply  to affected area two (2) times a day. PRN    SITagliptin (JANUVIA) 100 mg tablet TAKE 1 TABLET BY MOUTH DAILY    glipiZIDE (GLUCOTROL) 10 mg tablet Take 1 Tab by mouth two (2) times a day.  vit O-T-K8-E-omega-3-ala-dha 250-3-50 unit,mg,unit chew Take 1 Tab by mouth daily.  cholecalciferol (VITAMIN D3) 1,000 unit cap Take 1,000 Units by mouth daily.  ferrous sulfate 325 mg (65 mg iron) tablet Take  by mouth Daily (before breakfast).  vitamin E (AQUA GEMS) 400 unit capsule Take 400 Units by mouth daily.  ascorbic acid (VITAMIN C) 500 mg tablet Take 500 mg by mouth daily.  multivitamin (ONE A DAY) tablet Take 1 Tab by mouth daily.  cyanocobalamin (VITAMIN B-12) 1,000 mcg tablet Take 1,000 mcg by mouth daily. No current facility-administered medications for this visit. Review of Systems:    General: Pt denies excessive weight gain or loss. Pt is able to conduct ADL's  Respiratory: Denies shortness of breath, HERNANDEZ, wheezing or stridor. Cardiovascular: Denies precordial pain, palpitations, edema or PND        Physical ExamPhysical Exam:      General: Well developed, in no acute distress. .  Chest: left subclavian pacer site approximated well, steri-strips intact  Neuro: A&Ox3, speech clear, gait stable. Assessment:   Assessment:     ICD-10-CM ICD-9-CM    1. S/P cardiac pacemaker procedure Z95.0 V45.01    2. Heart block I45.9 426.9    3. Bradycardia R00.1 427.89         Plan:     Patient feels well following implantation of a Medtronic pacemaker. Left subclavian pacemaker site approximated well, no discharge. Steri-strips are intact. No erythema or heat. Follow up as planned.      Kayden Leyva NP

## 2017-04-24 NOTE — PROGRESS NOTES
See device report - MDT DCPM in office device check, next check due in 3 months, declines remote home monitoring at this time.

## 2017-05-05 ENCOUNTER — TELEPHONE (OUTPATIENT)
Dept: FAMILY MEDICINE CLINIC | Age: 72
End: 2017-05-05

## 2017-05-05 ENCOUNTER — OFFICE VISIT (OUTPATIENT)
Dept: CARDIOLOGY CLINIC | Age: 72
End: 2017-05-05

## 2017-05-05 VITALS
SYSTOLIC BLOOD PRESSURE: 160 MMHG | HEART RATE: 75 BPM | HEIGHT: 63 IN | BODY MASS INDEX: 27.82 KG/M2 | RESPIRATION RATE: 16 BRPM | OXYGEN SATURATION: 94 % | DIASTOLIC BLOOD PRESSURE: 90 MMHG | WEIGHT: 157 LBS

## 2017-05-05 DIAGNOSIS — I10 BENIGN ESSENTIAL HTN: ICD-10-CM

## 2017-05-05 DIAGNOSIS — E11.9 TYPE 2 DIABETES MELLITUS WITHOUT COMPLICATION, WITHOUT LONG-TERM CURRENT USE OF INSULIN (HCC): Primary | ICD-10-CM

## 2017-05-05 DIAGNOSIS — Z95.0 S/P CARDIAC PACEMAKER PROCEDURE: ICD-10-CM

## 2017-05-05 DIAGNOSIS — E11.21 TYPE 2 DIABETES MELLITUS WITH DIABETIC NEPHROPATHY, WITHOUT LONG-TERM CURRENT USE OF INSULIN (HCC): ICD-10-CM

## 2017-05-05 DIAGNOSIS — Z72.0 TOBACCO ABUSE: ICD-10-CM

## 2017-05-05 DIAGNOSIS — I44.1 SECOND DEGREE AV BLOCK: Primary | ICD-10-CM

## 2017-05-05 RX ORDER — LANCETS
EACH MISCELLANEOUS
Qty: 1 EACH | Refills: 11 | Status: SHIPPED | OUTPATIENT
Start: 2017-05-05 | End: 2017-05-18 | Stop reason: SDUPTHER

## 2017-05-05 RX ORDER — LOSARTAN POTASSIUM 50 MG/1
50 TABLET ORAL DAILY
Qty: 30 TAB | Refills: 5 | Status: SHIPPED | OUTPATIENT
Start: 2017-05-05 | End: 2017-07-28 | Stop reason: SDUPTHER

## 2017-05-05 NOTE — PROGRESS NOTES
PATIENT ID VERIFIED WITH TWO PATIENT IDENTIFIERS. MEDICATION REVIEWED AND APPROVED BY DR. Mark Fritz.

## 2017-05-05 NOTE — TELEPHONE ENCOUNTER
Patient wants to let you know that she is feeling much better and wanted to thank you. Patient also needs a Rx for all of her diabetic supplies sent to her mail order pharmacy, [facility]. Patient can be reached at [phone].

## 2017-05-05 NOTE — TELEPHONE ENCOUNTER
Sending diabetic supplies per pt request. But pt was never reached for recent labs, letter sent, DM very poorly controlled needs to rtc asap to discuss.

## 2017-05-05 NOTE — MR AVS SNAPSHOT
Visit Information Date & Time Provider Department Dept. Phone Encounter #  
 5/5/2017 10:20 AM Carolina Haywood, [address] [facility] [phone] Your Appointments 8/1/2017  9:40 AM  
ESTABLISHED PATIENT with Carolina Haywood MD  
[address] [facility]man - [facility]) Appt Note: 3 mo f/u $0cp [address] [id number] [phone]  
  
   
 [address] [place] [id number] Upcoming Health Maintenance Date Due  
 GLAUCOMA SCREENING Q2Y 6/20/2012 MICROALBUMIN Q1 6/14/2017 LIPID PANEL Q1 6/14/2017 MEDICARE YEARLY EXAM 6/15/2017 INFLUENZA AGE 9 TO ADULT 8/1/2017 HEMOGLOBIN A1C Q6M 10/4/2017 Pneumococcal 65+ Low/Medium Risk (2 of 2 - PPSV23) 1/4/2018 FOOT EXAM Q1 1/4/2018 BREAST CANCER SCRN MAMMOGRAM 1/4/2019 DTaP/Tdap/Td series (2 - Td) 1/4/2027 COLONOSCOPY 1/4/2027 Allergies as of 5/5/2017  Review Complete On: 5/5/2017 By: Carolina Haywood MD  
  
 Severity Noted Reaction Type Reactions Codeine High 06/07/2014    Hives, Shortness of Breath Throat closes shut Flowers High 04/05/2017    Anaphylaxis  
 roses Keflex [Cephalexin] High 06/07/2014    Hives, Shortness of Breath Throat closes shut Pcn [Penicillins] High 06/07/2014    Hives, Shortness of Breath Throat closes shut Metformin Medium 05/05/2017    Diarrhea Ciprofloxacin  06/18/2016    Shortness of Breath Morphine  01/04/2017    Angioedema Throat swells Adhesive Tape-silicones Low 45/61/2728    Rash Current Immunizations  Never Reviewed No immunizations on file. Not reviewed this visit You Were Diagnosed With   
  
 Codes Comments Second degree AV block    -  Primary ICD-10-CM: I44.1 ICD-9-CM: 426.13 S/P cardiac pacemaker procedure     ICD-10-CM: Z95.0 ICD-9-CM: V45.01 Benign essential HTN     ICD-10-CM: I10 
ICD-9-CM: 401.1 Type 2 diabetes mellitus with diabetic nephropathy, without long-term current use of insulin ([facility])     ICD-10-CM: E11.21 
ICD-9-CM: 250.40, 583.81 Tobacco abuse     ICD-10-CM: Z72.0 ICD-9-CM: 305.1 Vitals BP Pulse Resp Height(growth percentile) Weight(growth percentile) SpO2  
 160/90 (BP 1 Location: Right arm, BP Patient Position: Sitting) 75 16 5' 3\" (1.6 m) 157 lb (71.2 kg) 94% BMI OB Status Smoking Status 27.81 kg/m2 Hysterectomy Current Every Day Smoker BMI and BSA Data Body Mass Index Body Surface Area  
 27.81 kg/m 2 1.78 m 2 Preferred Pharmacy Pharmacy Name Phone [facility] [address] [place] [address] Your Updated Medication List  
  
   
This list is accurate as of: 5/5/17 11:05 AM.  Always use your most recent med list.  
  
  
  
  
 ascorbic acid (vitamin C) 500 mg tablet Commonly known as:  VITAMIN C Take 500 mg by mouth daily. cholecalciferol 1,000 unit Cap Commonly known as:  VITAMIN D3 Take 1,000 Units by mouth daily. clotrimazole 1 % topical cream  
Commonly known as:  Jestine Spear Apply  to affected area two (2) times a day. PRN  
  
 ferrous sulfate 325 mg (65 mg iron) tablet Take  by mouth Daily (before breakfast). glipiZIDE 10 mg tablet Commonly known as:  Irene Gilding Take 1 Tab by mouth two (2) times a day. losartan 50 mg tablet Commonly known as:  COZAAR Take 1 Tab by mouth daily. multivitamin tablet Commonly known as:  ONE A DAY Take 1 Tab by mouth daily. SITagliptin 100 mg tablet Commonly known as:  Lorella Trisha TAKE 1 TABLET BY MOUTH DAILY  
  
 vit U-M-G8-E-omega-3-ala-dha 250-3-50 unit,mg,unit Chew Take 1 Tab by mouth daily. VITAMIN B-12 1,000 mcg tablet Generic drug:  cyanocobalamin Take 1,000 mcg by mouth daily. vitamin E 400 unit capsule Commonly known as:  [address] Take 400 Units by mouth daily. Prescriptions Sent to Pharmacy Refills  
 losartan (COZAAR) 50 mg tablet 5 Sig: Take 1 Tab by mouth daily. Class: Normal  
 Pharmacy: [facility] [address]  #: [phone] Route: Oral  
  
We Performed the Following AMB POC EKG ROUTINE W/ 12 LEADS, INTER & REP [13091 CPT(R)] Introducing [facility]! [employer] introduces [email or web address] patient portal. Now you can access parts of your medical record, email your doctor's office, and request medication refills online. 1. In your internet browser, go to [email or web address] 2. Click on the First Time User? Click Here link in the Sign In box. You will see the New Member Sign Up page. 3. Enter your [email or web address] Access Code exactly as it appears below. You will not need to use this code after youve completed the sign-up process. If you do not sign up before the expiration date, you must request a new code. · [email or web address] Access Code: [email or web address] Expires: 7/3/2017  6:31 PM 
 
4. Enter the last four digits of your Social Security Number (xxxx) and Date of Birth (mm/dd/yyyy) as indicated and click Submit. You will be taken to the next sign-up page. 5. Create a [email or web address] ID. This will be your [email or web address] login ID and cannot be changed, so think of one that is secure and easy to remember. 6. Create a [email or web address] password. You can change your password at any time. 7. Enter your Password Reset Question and Answer. This can be used at a later time if you forget your password. 8. Enter your e-mail address. You will receive e-mail notification when new information is available in [address] 9. Click Sign Up. You can now view and download portions of your medical record. 10. Click the Download Summary menu link to download a portable copy of your medical information.  
 
If you have questions, please visit the Frequently Asked Questions section of the [employer]. Remember, [employer] is NOT to be used for urgent needs. For medical emergencies, dial 911. Now available from your iPhone and Android! Please provide this summary of care documentation to your next provider. Your primary care clinician is listed as Bal Deluca. If you have any questions after today's visit, please call [phone].

## 2017-05-05 NOTE — PROGRESS NOTES
Aidan Sidhu is a 73 y.o. female is here for hospital f/u and new pt evaluation. Hx poorly controlled DM, hypertension, tobacco abuse, non-compliance seen early April by PCP--HR 40 with 2:1 AV block, sx of diploplia. To [facility], then subsequently to [facility] 4/5 and had dual chamber PPM placed without complication. Echo with normal LV function. Seen in f/u by Dr Keon Genao, pacer ok. BP has been consistently high, not on rx (even ACEI or ARB)--will agree to take. Works running [employer]. Planning to see Coco Controller for f/u DM. diploplia has mostly resolved, has not seen Ophth yet. The patient denies chest pain/ shortness of breath, orthopnea, PND, LE edema, palpitations, syncope, presyncope or fatigue.        Patient Active Problem List    Diagnosis Date Noted    S/P cardiac pacemaker procedure 04/06/2017    Second degree AV block 04/05/2017    Tobacco abuse 04/05/2017    Diplopia 04/05/2017    Benign essential HTN 06/14/2016    Vitamin D deficiency 06/14/2016    Fe deficiency anemia 06/14/2016    Proteinuria     Diabetes ([facility]       Shavon Guidry PA-C  Past Medical History:   Diagnosis Date    Diabetes ([facility]     Diplopia 4/5/2017    Mobitz (type) II atrioventricular block 4/5/2017    Proteinuria     S/P cardiac pacemaker procedure 4/6/2017 4/6/17 Medtronic dual chamber pacemaker implant    Tobacco abuse 4/5/2017      Past Surgical History:   Procedure Laterality Date    HX APPENDECTOMY      HX CHOLECYSTECTOMY      HX COLONOSCOPY  2014    nl, q 2-3 y    HX GYN      hysterectomy    HX ORTHOPAEDIC  1979    back     Allergies   Allergen Reactions    Codeine Hives and Shortness of Breath     Throat closes shut    Flowers Anaphylaxis     roses    Keflex [Cephalexin] Hives and Shortness of Breath     Throat closes shut    Pcn [Penicillins] Hives and Shortness of Breath     Throat closes shut    Metformin Diarrhea    Ciprofloxacin Shortness of Breath    Morphine Angioedema Throat swells    Adhesive Tape-Silicones Rash      Family History   Problem Relation Age of Onset    COPD Mother     Asthma Mother     Heart Disease Father     Cancer Maternal Grandmother     Stroke Maternal Grandmother     Cancer Maternal Grandfather     Cancer Paternal Grandmother     Heart Disease Paternal Grandfather       Social History     Social History    Marital status: SINGLE     Spouse name: N/A    Number of children: N/A    Years of education: N/A     Occupational History    Not on file. Social History Main Topics    Smoking status: Current Every Day Smoker     Packs/day: 1.00     Types: Cigarettes    Smokeless tobacco: Never Used    Alcohol use Yes      Comment: rare    Drug use: No    Sexual activity: Not Currently     Partners: Male     Other Topics Concern    Not on file     Social History Narrative      Current Outpatient Prescriptions   Medication Sig    losartan (COZAAR) 50 mg tablet Take 1 Tab by mouth daily.  clotrimazole (LOTRIMIN) 1 % topical cream Apply  to affected area two (2) times a day. PRN    SITagliptin (JANUVIA) 100 mg tablet TAKE 1 TABLET BY MOUTH DAILY    glipiZIDE (GLUCOTROL) 10 mg tablet Take 1 Tab by mouth two (2) times a day.  vit P-T-H1-E-omega-3-ala-dha 250-3-50 unit,mg,unit chew Take 1 Tab by mouth daily.  cholecalciferol (VITAMIN D3) 1,000 unit cap Take 1,000 Units by mouth daily.  ferrous sulfate 325 mg (65 mg iron) tablet Take  by mouth Daily (before breakfast).  vitamin E (AQUA GEMS) 400 unit capsule Take 400 Units by mouth daily.  ascorbic acid (VITAMIN C) 500 mg tablet Take 500 mg by mouth daily.  multivitamin (ONE A DAY) tablet Take 1 Tab by mouth daily.  cyanocobalamin (VITAMIN B-12) 1,000 mcg tablet Take 1,000 mcg by mouth daily. No current facility-administered medications for this visit. Review of Symptoms:    CONST  No weight change.  No fever, chills, sweats    ENT No visual changes, URI sx, sore throat    CV  See HPI   RESP  No cough, or sputum, wheezing. Also see HPI   GI  No abdominal pain or change in bowel habits. No heartburn or dysphagia. No melena or rectal bleeding.   No dysuria, urgency, frequency, hematuria   MSKEL  No joint pain, swelling. No muscle pain. SKIN  No rash or lesions. NEURO  No headache, syncope, or seizure. No weakness, loss of sensation, or paresthesias. PSYCH  No low mood or depression  No anxiety. HE/LYMPH  No easy bruising, abnormal bleeding, or enlarged glands.         Physical ExamPhysical Exam:    Visit Vitals    /90 (BP 1 Location: Right arm, BP Patient Position: Sitting)    Pulse 75    Resp 16    Ht 5' 3\" (1.6 m)    Wt 157 lb (71.2 kg)    SpO2 94%    BMI 27.81 kg/m2     Gen: NAD  HEENT:  PERRL, throat clear  Neck: no mass or thyromegaly, no JVD   Heart:  Regular,Nl S1S2,  no murmur, gallop or rub.   Lungs:  clear  Abdomen:   Soft, non-tender, bowel sounds are active.   Extremities:  No edema  Pulse: symmetric  Neuro: A&O times 3, WNL      Cardiographics    ECG: A sensing, V pacing    Labs:   Lab Results   Component Value Date/Time    Sodium 143 04/04/2017 07:00 PM    Sodium 140 04/04/2017 03:36 PM    Sodium 144 06/14/2016 08:38 AM    Sodium 144 03/15/2016 11:04 AM    Sodium 141 01/13/2016 08:49 AM    Potassium 4.5 04/04/2017 07:00 PM    Potassium 4.7 04/04/2017 03:36 PM    Potassium 5.7 06/14/2016 08:38 AM    Potassium 5.5 03/15/2016 11:04 AM    Potassium 5.1 01/13/2016 08:49 AM    Chloride 105 04/04/2017 07:00 PM    Chloride 102 04/04/2017 03:36 PM    Chloride 104 06/14/2016 08:38 AM    Chloride 102 03/15/2016 11:04 AM    Chloride 99 01/13/2016 08:49 AM    CO2 29 04/04/2017 07:00 PM    CO2 22 04/04/2017 03:36 PM    CO2 25 06/14/2016 08:38 AM    CO2 21 03/15/2016 11:04 AM    CO2 24 01/13/2016 08:49 AM    Anion gap 9 04/04/2017 07:00 PM    Glucose 285 04/04/2017 07:00 PM    Glucose 373 04/04/2017 03:36 PM    Glucose 123 06/14/2016 08:38 AM Glucose 183 03/15/2016 11:04 AM    Glucose 261 01/13/2016 08:49 AM    BUN 25 04/04/2017 07:00 PM    BUN 24 04/04/2017 03:36 PM    BUN 12 06/14/2016 08:38 AM    BUN 15 03/15/2016 11:04 AM    BUN 14 01/13/2016 08:49 AM    Creatinine 0.93 04/04/2017 07:00 PM    Creatinine 0.83 04/04/2017 03:36 PM    Creatinine 0.74 06/14/2016 08:38 AM    Creatinine 0.75 03/15/2016 11:04 AM    Creatinine 0.71 01/13/2016 08:49 AM    BUN/Creatinine ratio 27 04/04/2017 07:00 PM    BUN/Creatinine ratio 29 04/04/2017 03:36 PM    BUN/Creatinine ratio 16 06/14/2016 08:38 AM    BUN/Creatinine ratio 20 03/15/2016 11:04 AM    BUN/Creatinine ratio 20 01/13/2016 08:49 AM    GFR est AA >60 04/04/2017 07:00 PM    GFR est AA 83 04/04/2017 03:36 PM    GFR est AA 96 06/14/2016 08:38 AM    GFR est AA 95 03/15/2016 11:04 AM    GFR est  01/13/2016 08:49 AM    GFR est non-AA 60 04/04/2017 07:00 PM    GFR est non-AA 72 04/04/2017 03:36 PM    GFR est non-AA 84 06/14/2016 08:38 AM    GFR est non-AA 82 03/15/2016 11:04 AM    GFR est non-AA 88 01/13/2016 08:49 AM    Calcium 9.4 04/04/2017 07:00 PM    Calcium 9.6 04/04/2017 03:36 PM    Calcium 10.0 06/14/2016 08:38 AM    Calcium 10.3 03/15/2016 11:04 AM    Calcium 9.8 01/13/2016 08:49 AM    Bilirubin, total 0.2 04/04/2017 07:00 PM    Bilirubin, total <0.2 04/04/2017 03:36 PM    Bilirubin, total 0.3 06/14/2016 08:38 AM    Bilirubin, total 0.2 06/29/2015 07:46 AM    Bilirubin, total 0.3 06/07/2014 10:28 AM    AST (SGOT) 11 04/04/2017 07:00 PM    AST (SGOT) 8 04/04/2017 03:36 PM    AST (SGOT) 13 06/14/2016 08:38 AM    AST (SGOT) 15 06/29/2015 07:46 AM    AST (SGOT) 11 06/07/2014 10:28 AM    Alk. phosphatase 87 04/04/2017 07:00 PM    Alk. phosphatase 90 04/04/2017 03:36 PM    Alk. phosphatase 65 06/14/2016 08:38 AM    Alk. phosphatase 87 06/29/2015 07:46 AM    Alk.  phosphatase 63 06/07/2014 10:28 AM    Protein, total 6.7 04/04/2017 07:00 PM    Protein, total 6.4 04/04/2017 03:36 PM    Protein, total 6.8 06/14/2016 08:38 AM    Protein, total 6.5 06/29/2015 07:46 AM    Protein, total 6.3 06/07/2014 10:28 AM    Albumin 3.3 04/04/2017 07:00 PM    Albumin 3.6 04/04/2017 03:36 PM    Albumin 4.4 06/14/2016 08:38 AM    Albumin 4.3 06/29/2015 07:46 AM    Albumin 4.1 06/07/2014 10:28 AM    Globulin 3.4 04/04/2017 07:00 PM    A-G Ratio 1.0 04/04/2017 07:00 PM    A-G Ratio 1.3 04/04/2017 03:36 PM    A-G Ratio 1.8 06/14/2016 08:38 AM    A-G Ratio 2.0 06/29/2015 07:46 AM    A-G Ratio 1.9 06/07/2014 10:28 AM    ALT (SGPT) 15 04/04/2017 07:00 PM    ALT (SGPT) 9 04/04/2017 03:36 PM    ALT (SGPT) 12 06/14/2016 08:38 AM    ALT (SGPT) 12 06/29/2015 07:46 AM    ALT (SGPT) 11 06/07/2014 10:28 AM     Lab Results   Component Value Date/Time    CK 36 04/04/2017 07:00 PM     Lab Results   Component Value Date/Time    Cholesterol, total 144 06/14/2016 08:38 AM    Cholesterol, total 170 06/29/2015 07:46 AM    HDL Cholesterol 42 06/14/2016 08:38 AM    HDL Cholesterol 43 06/29/2015 07:46 AM    LDL, calculated 79 06/14/2016 08:38 AM    LDL, calculated 102 06/29/2015 07:46 AM    Triglyceride 115 06/14/2016 08:38 AM    Triglyceride 127 06/29/2015 07:46 AM     No results found for this or any previous visit. Assessment:         Patient Active Problem List    Diagnosis Date Noted    S/P cardiac pacemaker procedure 04/06/2017    Second degree AV block 04/05/2017    Tobacco abuse 04/05/2017    Diplopia 04/05/2017    Benign essential HTN 06/14/2016    Vitamin D deficiency 06/14/2016    Fe deficiency anemia 06/14/2016    Proteinuria     Diabetes [facility]      Hospital  F/u and new pt evaluation. Hx poorly controlled DM, hypertension, tobacco abuse, non-compliance seen early April by PCP--HR 40 with 2:1 AV block, sx of diploplia. To [facility], then subsequently to [facility] 4/5 and had dual chamber PPM placed without complication. Echo with normal LV function. Seen in f/u by Dr Flor Jaramillo, pacer ok.   BP has been consistently high, not on rx (even ACEI or ARB)--will agree to take. Works running [employer]. Planning to see Jevon Villar for f/u DM. Plan:     WIll start Losartan 50mg every day (Hypertension, DM)  DM management per PCP  Smoking cessation discussed  Need for compliance discussed  Lipids and labs followed by PCP. Continue current care and f/u in 3 months, sooner prn.     Shavon Buchanan MD

## 2017-05-08 NOTE — TELEPHONE ENCOUNTER
Left a detailed message to make an appointment for f/u to review labs and diabetes,and to call and schedule an appointment.

## 2017-05-10 ENCOUNTER — OFFICE VISIT (OUTPATIENT)
Dept: FAMILY MEDICINE CLINIC | Age: 72
End: 2017-05-10

## 2017-05-10 VITALS
BODY MASS INDEX: 28.35 KG/M2 | RESPIRATION RATE: 19 BRPM | OXYGEN SATURATION: 97 % | HEART RATE: 86 BPM | WEIGHT: 160 LBS | SYSTOLIC BLOOD PRESSURE: 124 MMHG | TEMPERATURE: 98 F | HEIGHT: 63 IN | DIASTOLIC BLOOD PRESSURE: 64 MMHG

## 2017-05-10 DIAGNOSIS — E11.21 TYPE 2 DIABETES MELLITUS WITH DIABETIC NEPHROPATHY, WITHOUT LONG-TERM CURRENT USE OF INSULIN (HCC): Primary | ICD-10-CM

## 2017-05-10 DIAGNOSIS — I10 BENIGN ESSENTIAL HTN: ICD-10-CM

## 2017-05-10 RX ORDER — INSULIN PUMP SYRINGE, 3 ML
EACH MISCELLANEOUS
Qty: 1 KIT | Refills: 0 | Status: SHIPPED | OUTPATIENT
Start: 2017-05-10 | End: 2017-05-18 | Stop reason: SDUPTHER

## 2017-05-10 NOTE — PROGRESS NOTES
Chief Complaint   Patient presents with    Hypertension     follow up with blood pressure     Morning meds taken this Chris Holcomb LPN

## 2017-05-10 NOTE — MR AVS SNAPSHOT
Visit Information Date & Time Provider Department Dept. Phone Encounter #  
 5/10/2017  1:00 PM Palmer Milligan PA-C [facility] [phone] [id number] Your Appointments 8/1/2017  9:40 AM  
ESTABLISHED PATIENT with Too Kramer MD  
[address] [facility]) Appt Note: 3 mo f/u $0cp [address] [id number] [phone]  
  
   
 [address] [address] Upcoming Health Maintenance Date Due  
 GLAUCOMA SCREENING Q2Y 6/20/2012 MICROALBUMIN Q1 6/14/2017 LIPID PANEL Q1 6/14/2017 MEDICARE YEARLY EXAM 6/15/2017 INFLUENZA AGE 9 TO ADULT 8/1/2017 HEMOGLOBIN A1C Q6M 10/4/2017 Pneumococcal 65+ Low/Medium Risk (2 of 2 - PPSV23) 1/4/2018 FOOT EXAM Q1 1/4/2018 BREAST CANCER SCRN MAMMOGRAM 1/4/2019 DTaP/Tdap/Td series (2 - Td) 1/4/2027 COLONOSCOPY 1/4/2027 Allergies as of 5/10/2017  Review Complete On: 5/10/2017 By: Palmer Milligan PA-C Severity Noted Reaction Type Reactions Codeine High 06/07/2014    Hives, Shortness of Breath Throat closes shut Flowers High 04/05/2017    Anaphylaxis  
 roses Keflex [Cephalexin] High 06/07/2014    Hives, Shortness of Breath Throat closes shut Pcn [Penicillins] High 06/07/2014    Hives, Shortness of Breath Throat closes shut Metformin Medium 05/05/2017    Diarrhea Ciprofloxacin  06/18/2016    Shortness of Breath Morphine  01/04/2017    Angioedema Throat swells Adhesive Tape-silicones Low 91/77/7679    Rash Current Immunizations  Never Reviewed No immunizations on file. Not reviewed this visit You Were Diagnosed With   
  
 Codes Comments Type 2 diabetes mellitus with diabetic nephropathy, without long-term current use of insulin (HCC)    -  Primary ICD-10-CM: E11.21 
ICD-9-CM: 250.40, 583.81 Vitals BP Pulse Temp Resp Height(growth percentile) Weight(growth percentile) 124/64 (BP 1 Location: Right arm, BP Patient Position: Sitting) 86 98 °F (36.7 °C) (Temporal) 19 5' 3\" (1.6 m) 160 lb (72.6 kg) SpO2 BMI OB Status Smoking Status 97% 28.34 kg/m2 Hysterectomy Current Every Day Smoker Vitals History BMI and BSA Data Body Mass Index Body Surface Area  
 28.34 kg/m 2 1.8 m 2 Preferred Pharmacy Pharmacy Name Phone [facility]idelina Saleh [address] [place], [place] [address] [address] [phone] Your Updated Medication List  
  
   
This list is accurate as of: 5/10/17  1:43 PM.  Always use your most recent med list.  
  
  
  
  
 ascorbic acid (vitamin C) 500 mg tablet Commonly known as:  VITAMIN C Take 500 mg by mouth daily. Blood-Glucose Meter monitoring kit For TID testing. cholecalciferol 1,000 unit Cap Commonly known as:  VITAMIN D3 Take 1,000 Units by mouth daily. clotrimazole 1 % topical cream  
Commonly known as:  Becket Sat Apply  to affected area two (2) times a day. PRN  
  
 ferrous sulfate 325 mg (65 mg iron) tablet Take  by mouth Daily (before breakfast). glipiZIDE 10 mg tablet Commonly known as:  Demian Edmondson Take 1 Tab by mouth two (2) times a day. glucose blood VI test strips strip Commonly known as:  ASCENSIA AUTODISC VI, ONE TOUCH ULTRA TEST VI  
TID testing Lancets Misc TID testing.  
  
 losartan 50 mg tablet Commonly known as:  COZAAR Take 1 Tab by mouth daily. multivitamin tablet Commonly known as:  ONE A DAY Take 1 Tab by mouth daily. SITagliptin 100 mg tablet Commonly known as:  Beto Gentile TAKE 1 TABLET BY MOUTH DAILY  
  
 vit M-[id number]-U8-E-omega-3-ala-dha 250-3-50 unit,mg,unit Chew Take 1 Tab by mouth daily. VITAMIN B-12 1,000 mcg tablet Generic drug:  cyanocobalamin Take 1,000 mcg by mouth daily. vitamin E 400 unit capsule Commonly known as:  Juan Johannas Henry [address] Take 400 Units by mouth daily. Prescriptions Sent to Pharmacy Refills Blood-Glucose Meter monitoring kit 0 Sig: For TID testing. Class: Normal  
 Pharmacy: [address] [place] [address], [address] [place], [place] [address]Th [facility] #: [phone] Patient Instructions Noninsulin Medicines for Type 2 Diabetes: Care Instructions Your Care Instructions There are different types of noninsulin medicines for diabetes. Each works in a different way to help you control your blood sugar. Some types help your body make insulin to lower your blood sugar. Others lower how much insulin your body needs. Some can slow how quickly your body digests sugars. And some can remove extra glucose through your urine. Some of these medicines are: · Alpha-glucosidase inhibitors. These keep starches from breaking down. This means that they lower the amount of glucose absorbed when you eat. They do not help your body make more insulin. So they will not cause low blood sugar unless they are used with other medicines for diabetes. They include acarbose and miglitol. · DPP-4 inhibitors. These help your body increase the level of insulin after eating. They also help your body make less of a hormone that raises blood sugar. They include linagliptin, saxagliptin, and sitagliptin. · Incretin hormones (GLP-1 receptor agonists). Your body makes a protein that can raise your insulin level, lower your blood sugar, and make you less hungry. You can inject hormone medicines that work the same way as this protein. They include exenatide and liraglutide. · Meglitinides. These help your body release insulin. They also help slow how your body digests sugars. In this way, they prevent your blood sugar from rising too fast after you eat. They include nateglinide and repaglinide. · Metformin. This lowers how much glucose your liver makes.  And it helps you respond better to insulin. It also lowers the amount of stored sugar that your liver releases when you are not eating. · Sodium glucose co-transporter 2 inhibitors (SGLT2 inhibitors). These help to remove extra glucose through your urine. They may also help some people lose weight. They include canagliflozin, dapagliflozin, and empagliflozin. · Sulfonylureas. These help your body release more insulin. Some work for many hours and can cause low blood sugar if you don't eat as you expected. They include glipizide and glyburide. · Thiazolidinediones. These reduce the amount of blood glucose. They also help you respond better to insulin. They include pioglitazone and rosiglitazone. You may need to take more than one medicine for diabetes. Two or more medicines may work better to lower your blood sugar level than just one medicine. Follow-up care is a key part of your treatment and safety. Be sure to make and go to all appointments, and call your doctor if you are having problems. It's also a good idea to know your test results and keep a list of the medicines you take. How can you care for yourself at home? · Eat a healthy diet. Get some exercise each day. This may help you to reduce how much medicine you need. · Do not take other prescription or over-the-counter medicines, vitamins, herbal products, or supplements without talking to your doctor first. Some medicines for type 2 diabetes can cause problems with other medicines or supplements. · Tell your doctor if you plan to get pregnant. Some of these drugs are not safe for pregnant women. · Be safe with medicines. Take your medicines exactly as prescribed. Meglitinides or sulfonylureas can cause your blood sugar to drop very low. Call your doctor if you think you are having a problem with your medicine. · Check your blood sugar levels often. You can use a glucose monitor.  Keeping track can help you know how certain foods, activities, and medicines affect your blood sugar. And it can help you keep your blood sugar from getting so low that it's not safe. When should you call for help? Call 911 anytime you think you may need emergency care. For example, call if: 
· You passed out (lost consciousness), or you suddenly become very sleepy or confused. (You may have very low blood sugar.) · You have symptoms of high blood sugar, such as: ¨ Blurred vision. ¨ Trouble staying awake or being woken up. ¨ Fast, deep breathing. ¨ Breath that smells fruity. ¨ Belly pain, not feeling hungry, and vomiting. ¨ Feeling confused. Call your doctor now or seek immediate medical care if: 
· You are sick and cannot control your blood sugar. · You have been vomiting or have had diarrhea for more than 6 hours. · Your blood sugar stays higher than the level your doctor has set for you. · You have symptoms of low blood sugar, such as: ¨ Sweating. ¨ Feeling nervous, shaky, and weak. ¨ Extreme hunger and slight nausea. ¨ Dizziness and headache. ¨ Blurred vision. ¨ Confusion. Watch closely for changes in your health, and be sure to contact your doctor if: 
· You have a hard time knowing when your blood sugar is low. · You have trouble keeping your blood sugar in the target range. · You often have problems controlling your blood sugar. · You have symptoms of long-term diabetes problems, such as: ¨ New vision changes. ¨ New pain, numbness, or tingling in your hands or feet. ¨ Skin problems. Where can you learn more? Go to http://kamaljit-quan.info/. Enter H153 in the search box to learn more about \"Noninsulin Medicines for Type 2 Diabetes: Care Instructions. \" Current as of: August 3, 2016 Content Version: 11.2 © 1693-7621 [employer]. Care instructions adapted under license by [employer] (which disclaims liability or warranty for this information).  If you have questions about a medical condition or this instruction, always ask your healthcare professional. [place] [address] any warranty or liability for your use of this information. Learning About Diabetes Food Guidelines Your Care Instructions Meal planning is important to manage diabetes. It helps keep your blood sugar at a target level (which you set with your doctor). You don't have to eat special foods. You can eat what your family eats, including sweets once in a while. But you do have to pay attention to how often you eat and how much you eat of certain foods. You may want to work with a dietitian or a certified diabetes educator (CDE) to help you plan meals and snacks. A dietitian or CDE can also help you lose weight if that is one of your goals. What should you know about eating carbs? Managing the amount of carbohydrate (carbs) you eat is an important part of healthy meals when you have diabetes. Carbohydrate is found in many foods. · Learn which foods have carbs. And learn the amounts of carbs in different foods. ¨ Bread, cereal, pasta, and rice have about 15 grams of carbs in a serving. A serving is 1 slice of bread (1 ounce), ½ cup of cooked cereal, or 1/3 cup of cooked pasta or rice. ¨ Fruits have 15 grams of carbs in a serving. A serving is 1 small fresh fruit, such as an apple or orange; ½ of a banana; ½ cup of cooked or canned fruit; ½ cup of fruit juice; 1 cup of melon or raspberries; or 2 tablespoons of dried fruit. ¨ Milk and no-sugar-added yogurt have 15 grams of carbs in a serving. A serving is 1 cup of milk or 2/3 cup of no-sugar-added yogurt. ¨ Starchy vegetables have 15 grams of carbs in a serving. A serving is ½ cup of mashed potatoes or sweet potato; 1 cup winter squash; ½ of a small baked potato; ½ cup of cooked beans; or ½ cup cooked corn or green peas. · Learn how much carbs to eat each day and at each meal. A dietitian or CDE can teach you how to keep track of the amount of carbs you eat.  This is called carbohydrate counting. · If you are not sure how to count carbohydrate grams, use the Plate Method to plan meals. It is a good, quick way to make sure that you have a balanced meal. It also helps you spread carbs throughout the day. ¨ Divide your plate by types of foods. Put non-starchy vegetables on half the plate, meat or other protein food on one-quarter of the plate, and a grain or starchy vegetable in the final quarter of the plate. To this you can add a small piece of fruit and 1 cup of milk or yogurt, depending on how many carbs you are supposed to eat at a meal. 
· Try to eat about the same amount of carbs at each meal. Do not \"save up\" your daily allowance of carbs to eat at one meal. 
· Proteins have very little or no carbs per serving. Examples of proteins are beef, chicken, turkey, fish, eggs, tofu, cheese, cottage cheese, and peanut butter. A serving size of meat is 3 ounces, which is about the size of a deck of cards. Examples of meat substitute serving sizes (equal to 1 ounce of meat) are 1/4 cup of cottage cheese, 1 egg, 1 tablespoon of peanut butter, and ½ cup of tofu. How can you eat out and still eat healthy? · Learn to estimate the serving sizes of foods that have carbohydrate. If you measure food at home, it will be easier to estimate the amount in a serving of restaurant food. · If the meal you order has too much carbohydrate (such as potatoes, corn, or baked beans), ask to have a low-carbohydrate food instead. Ask for a salad or green vegetables. · If you use insulin, check your blood sugar before and after eating out to help you plan how much to eat in the future. · If you eat more carbohydrate at a meal than you had planned, take a walk or do other exercise. This will help lower your blood sugar. What else should you know? · Limit saturated fat, such as the fat from meat and dairy products.  This is a healthy choice because people who have diabetes are at higher risk of heart disease. So choose lean cuts of meat and nonfat or low-fat dairy products. Use olive or canola oil instead of butter or shortening when cooking. · Don't skip meals. Your blood sugar may drop too low if you skip meals and take insulin or certain medicines for diabetes. · Check with your doctor before you drink alcohol. Alcohol can cause your blood sugar to drop too low. Alcohol can also cause a bad reaction if you take certain diabetes medicines. Follow-up care is a key part of your treatment and safety. Be sure to make and go to all appointments, and call your doctor if you are having problems. It's also a good idea to know your test results and keep a list of the medicines you take. Where can you learn more? Go to http://kamaljit-quan.info/. Enter O689 in the search box to learn more about \"Learning About Diabetes Food Guidelines. \" Current as of: May 23, 2016 Content Version: 11.2 © 5355-9847 [employer]. Care instructions adapted under license by [employer] (which disclaims liability or warranty for this information). If you have questions about a medical condition or this instruction, always ask your healthcare professional. [place] [address] any warranty or liability for your use of this information. Learning About Meal Planning for Diabetes Why plan your meals? Meal planning can be a key part of managing diabetes. Planning meals and snacks with the right balance of carbohydrate, protein, and fat can help you keep your blood sugar at the target level you set with your doctor. You don't have to eat special foods. You can eat what your family eats, including sweets once in a while. But you do have to pay attention to how often you eat and how much you eat of certain foods. You may want to work with a dietitian or a certified diabetes educator.  He or she can give you tips and meal ideas and can answer your questions about meal planning. This health professional can also help you reach a healthy weight if that is one of your goals. What plan is right for you? Your dietitian or diabetes educator may suggest that you start with the plate format or carbohydrate counting. The plate format The plate format is a simple way to help you manage how you eat. You plan meals by learning how much space each food should take on a plate. Using the plate format helps you spread carbohydrate throughout the day. It can make it easier to keep your blood sugar level within your target range. It also helps you see if you're eating healthy portion sizes. To use the plate format, you put non-starchy vegetables on half your plate. Add meat or meat substitutes on one-quarter of the plate. Put a grain or starchy vegetable (such as brown rice or a potato) on the final quarter of the plate. You can add a small piece of fruit and some low-fat or fat-free milk or yogurt, depending on your carbohydrate goal for each meal. 
Here are some tips for using the plate format: · Make sure that you are not using an oversized plate. A 9-inch plate is best. Many restaurants use larger plates. · Get used to using the plate format at home. Then you can use it when you eat out. · Write down your questions about using the plate format. Talk to your doctor, a dietitian, or a diabetes educator about your concerns. Carbohydrate counting With carbohydrate counting, you plan meals based on the amount of carbohydrate in each food. Carbohydrate raises blood sugar higher and more quickly than any other nutrient. It is found in desserts, breads and cereals, and fruit. It's also found in starchy vegetables such as potatoes and corn, grains such as rice and pasta, and milk and yogurt. Spreading carbohydrate throughout the day helps keep your blood sugar levels within your target range.  
Your daily amount depends on several things, including your weight, how active you are, which diabetes medicines you take, and what your goals are for your blood sugar levels. A registered dietitian or diabetes educator can help you plan how much carbohydrate to include in each meal and snack. A guideline for your daily amount of carbohydrate is: · 45 to 60 grams at each meal. That's about the same as 3 to 4 carbohydrate servings. · 15 to 20 grams at each snack. That's about the same as 1 carbohydrate serving. The Nutrition Facts label on packaged foods tells you how much carbohydrate is in a serving of the food. First, look at the serving size on the food label. Is that the amount you eat in a serving? All of the nutrition information on a food label is based on that serving size. So if you eat more or less than that, you'll need to adjust the other numbers. Total carbohydrate is the next thing you need to look for on the label. If you count carbohydrate servings, one serving of carbohydrate is 15 grams. For foods that don't come with labels, such as fresh fruits and vegetables, you'll need a guide that lists carbohydrate in these foods. Ask your doctor, dietitian, or diabetes educator about books or other nutrition guides you can use. If you take insulin, you need to know how many grams of carbohydrate are in a meal. This lets you know how much rapid-acting insulin to take before you eat. If you use an insulin pump, you get a constant rate of insulin during the day. So the pump must be programmed at meals to give you extra insulin to cover the rise in blood sugar after meals. When you know how much carbohydrate you will eat, you can take the right amount of insulin. Or, if you always use the same amount of insulin, you need to make sure that you eat the same amount of carbohydrate at meals. If you need more help to understand carbohydrate counting and food labels, ask your doctor, dietitian, or diabetes educator. How do you get started with meal planning? Here are some tips to get started: 
· Plan your meals a week at a time. Don't forget to include snacks too. · Use cookbooks or online recipes to plan several main meals. Plan some quick meals for busy nights. You also can double some recipes that freeze well. Then you can save half for other busy nights when you don't have time to cook. · Make sure you have the ingredients you need for your recipes. If you're running low on basic items, put these items on your shopping list too. · List foods that you use to make breakfasts, lunches, and snacks. List plenty of fruits and vegetables. · Post this list on the refrigerator. Add to it as you think of more things you need. · Take the list to the store to do your weekly shopping. Follow-up care is a key part of your treatment and safety. Be sure to make and go to all appointments, and call your doctor if you are having problems. It's also a good idea to know your test results and keep a list of the medicines you take. Where can you learn more? Go to http://kamaljit-quan.info/. Otis Birch in the search box to learn more about \"Learning About Meal Planning for Diabetes. \" Current as of: October 26, 2016 Content Version: 11.2 © 6953-6288 [employer]. Care instructions adapted under license by [employer] (which disclaims liability or warranty for this information). If you have questions about a medical condition or this instruction, always ask your healthcare professional. [place] [address] any warranty or liability for your use of this information. Diabetes Foot Health: Care Instructions Your Care Instructions When you have diabetes, your feet need extra care and attention. Diabetes can damage the nerve endings and blood vessels in your feet, making you less likely to notice when your feet are injured.  Diabetes also limits your body's ability to fight infection and get blood to areas that need it. If you get a minor foot injury, it could become an ulcer or a serious infection. With good foot care, you can prevent most of these problems. Caring for your feet can be quick and easy. Most of the care can be done when you are bathing or getting ready for bed. Follow-up care is a key part of your treatment and safety. Be sure to make and go to all appointments, and call your doctor if you are having problems. Its also a good idea to know your test results and keep a list of the medicines you take. How can you care for yourself at home? · Keep your blood sugar close to normal by watching what and how much you eat, monitoring blood sugar, taking medicines if prescribed, and getting regular exercise. · Do not smoke. Smoking affects blood flow and can make foot problems worse. If you need help quitting, talk to your doctor about stop-smoking programs and medicines. These can increase your chances of quitting for good. · Eat a diet that is low in fats. High fat intake can cause fat to build up in your blood vessels and decrease blood flow. · Inspect your feet daily for blisters, cuts, cracks, or sores. If you cannot see well, use a mirror or have someone help you. · Take care of your feet: 
[facility] your feet every day. Use warm (not hot) water. Check the water temperature with your wrists or other part of your body, not your feet. ¨ Dry your feet well. Pat them dry. Do not rub the skin on your feet too hard. Dry well between your toes. If the skin on your feet stays moist, bacteria or a fungus can grow, which can lead to infection. ¨ Keep your skin soft. Use moisturizing skin cream to keep the skin on your feet soft and prevent calluses and cracks. But do not put the cream between your toes, and stop using any cream that causes a rash. ¨ Clean underneath your toenails carefully. Do not use a sharp object to clean underneath your toenails. Use the blunt end of a nail file or other rounded tool. ¨ Trim and file your toenails straight across to prevent ingrown toenails. Use a nail clipper, not scissors. Use an emery board to smooth the edges. · Change socks daily. Socks without seams are best, because seams often rub the feet. You can find socks for people with diabetes from specialty catalogs. · Look inside your shoes every day for things like gravel or torn linings, which could cause blisters or sores. · Buy shoes that fit well: 
¨ Look for shoes that have plenty of space around the toes. This helps prevent bunions and blisters. ¨ Try on shoes while wearing the kind of socks you will usually wear with the shoes. ¨ Avoid plastic shoes. They may rub your feet and cause blisters. Good shoes should be made of materials that are flexible and breathable, such as leather or cloth. ¨ Break in new shoes slowly by wearing them for no more than an hour a day for several days. Take extra time to check your feet for red areas, blisters, or other problems after you wear new shoes. · Do not go barefoot. Do not wear sandals, and do not wear shoes with very thin soles. Thin soles are easy to puncture. They also do not protect your feet from hot pavement or cold weather. · Have your doctor check your feet during each visit. If you have a foot problem, see your doctor. Do not try to treat an early foot problem at home. Home remedies or treatments that you can buy without a prescription (such as corn removers) can be harmful. · Always get early treatment for foot problems. A minor irritation can lead to a major problem if not properly cared for early. When should you call for help? Call your doctor now or seek immediate medical care if: 
· You have a foot sore, an ulcer or break in the skin that is not healing after 4 days, bleeding corns or calluses, or an ingrown toenail. · You have blue or black areas, which can mean bruising or blood flow problems. · You have peeling skin or tiny blisters between your toes or cracking or oozing of the skin. · You have a fever for more than 24 hours and a foot sore. · You have new numbness or tingling in your feet that does not go away after you move your feet or change positions. · You have unexplained or unusual swelling of the foot or ankle. Watch closely for changes in your health, and be sure to contact your doctor if: 
· You cannot do proper foot care. Where can you learn more? Go to http://kamaljit-quan.info/. Enter A739 in the search box to learn more about \"Diabetes Foot Health: Care Instructions. \" Current as of: May 23, 2016 Content Version: 11.2 © 7622-0803 [employer]. Care instructions adapted under license by [employer] (which disclaims liability or warranty for this information). If you have questions about a medical condition or this instruction, always ask your healthcare professional. [place] [address] any warranty or liability for your use of this information. Learning About Diabetes and Your Teeth How does diabetes affect your teeth and gums? When you have diabetes, managing blood sugar levels and taking good care of your teeth and gums are both important. When blood sugar levels are high, there's a greater risk for: · Gum (periodontal) disease. · Tooth decay. · Fungal infections in the mouth, like thrush. · Dry mouth, or xerostomia (say \"zee-ruh-STO-cedrick-uh\"). The mouth needs saliva to neutralize the acids in your mouth. These acids can lead to gum disease and tooth decay. Keeping your blood sugar levels in your target range can help prevent problems with the teeth and gums. If you have any problems with your teeth or gums, see your dentist. 
How do you care for your teeth and gums when you have diabetes? · Brush your teeth twice a day. · Floss daily. Make sure to press the floss against your teeth and not your gums. · Check each day for areas where your gums might be red or painful. Be sure to let your dentist know of any sores in your mouth. · See your dentist regularly for professional cleaning of your teeth and to look for gum problems. Many dentists recommend getting checkups twice a year. Remind your dentist that you have diabetes before any work is done. · Don't smoke or use smokeless tobacco. Tobacco use with diabetes can lead to a greater risk of severe gum disease. If you need help quitting, talk to your doctor about stop-smoking programs and medicines. These can increase your chances of quitting for good. Follow-up care is a key part of your treatment and safety. Be sure to make and go to all appointments, and call your doctor if you are having problems. It's also a good idea to know your test results and keep a list of the medicines you take. Where can you learn more? Go to http://kamaljit-quan.info/. Enter C161 in the search box to learn more about \"Learning About Diabetes and Your Teeth. \" 
Current as of: May 23, 2016 Content Version: 11.2 © 1156-0844 [employer]. Care instructions adapted under license by [employer] (which disclaims liability or warranty for this information). If you have questions about a medical condition or this instruction, always ask your healthcare professional. [address] any warranty or liability for your use of this information. Diabetes Blood Sugar Emergencies: Your Action Plan How can you prevent a blood sugar emergency? An important part of living with diabetes is keeping your blood sugar in your target range. You'll need to know what to do if it's too high or too low. Managing your blood sugar levels helps you avoid emergencies. This care sheet will teach you about the signs of high and low blood sugar. It will help you make an action plan with your doctor for when these signs occur. Low blood sugar is more likely to happen if you take certain medicines for diabetes. It can also happen if you skip a meal, drink alcohol, or exercise more than usual. 
You may get high blood sugar if you eat differently than you normally do. One example is eating more carbohydrate than usual. Having a cold, the flu, or other sudden illness can also cause high blood sugar levels. Levels can also rise if you miss a dose of medicine. Any change in how you take your medicine may affect your blood sugar level. So it's important to work with your doctor before you make any changes. Check your blood sugar Work with your doctor to fill in the blank spaces below that apply to you. Track your levels, know your target range, and write down ways you can get your blood sugar back in your target range. A log book can help you track your levels. Take the book to all of your medical appointments. · Check your blood sugar _____ times a day, at these times:________________________________________________. (For example: Before meals, at bedtime, before exercise, during exercise, other.) · Your blood sugar target range before a meal is ___________________. Your blood sugar target range after a meal is _______________________. · Do this___________________________________________________to get your blood sugar back within your safe range if your blood sugar results are _________________________________________. (For example: Less than 70 or above 250 mg/dL.) Call your doctor when your blood sugar results are ___________________________________. (For example: Less than 70 or above 250 mg/dL.) What are the symptoms of low and high blood sugar? Common symptoms of low blood sugar are sweating and feeling shaky, weak, hungry, or confused. Symptoms can start quickly. Common symptoms of high blood sugar are feeling very thirsty or very hungry.  You may also pass urine more often than usual. You may have blurry vision and may lose weight without trying. But some people may have high or low blood sugar without having any symptoms. That's a good reason to check your blood sugar on a regular schedule. What should you do if you have symptoms? Work with your doctor to fill in the blank spaces below that apply to you. Low blood sugar If you have symptoms of low blood sugar, check your blood sugar. If it's below _____ (for example, below 70), eat or drink a quick-sugar food that has about 15 grams of carbohydrate. Your goal is to get your level back to your safe range. Check your blood sugar again 15 minutes later. If it's still not in your target range, take another 15 grams of carbohydrate and check your blood sugar again in 15 minutes. Repeat this until you reach your target. Then go back to your regular testing schedule. When you have low blood sugar, it's best to stop or reduce any physical activity until your blood sugar is back in your target range and is stable. If you must stay active, eat or drink 30 grams of carbohydrate. Then check your blood sugar again in 15 minutes. If it's not in your target range, take another 30 grams of carbohydrates. Check your blood sugar again in 15 minutes. Keep doing this until you reach your target. You can then go back to your regular testing schedule. If your symptoms or blood sugar levels are getting worse or have not improved after 15 minutes, seek medical care right away. Here are some examples of quick-sugar foods with 15 grams of carbohydrate: · 3 or 4 glucose tablets · 1 tube of glucose gel · Hard candy (such as 3 Jolly Ranchers or 5 to [employer]) · ½ cup to ¾ cup (4 to 6 ounces) of fruit juice or regular (not diet) soda High blood sugar If you have symptoms of high blood sugar, check your blood sugar. Your goal is to get your level back to your target range. If it's above ______ (for example, above 250), follow these steps: · If you missed a dose of your diabetes medicine, take it now. Take only the amount of medicine that you have been prescribed. Do not take more or less medicine. · Give yourself insulin if your doctor has prescribed it for high blood sugar. · Test for ketones, if the doctor told you to do so. If the results of the ketone test show a moderate-to-large amount of ketones, call the doctor for advice. · Wait 30 minutes after you take the extra insulin or the missed medicine. Check your blood sugar again. If your symptoms or blood sugar levels are getting worse or have not improved after taking these steps, seek medical care right away. Follow-up care is a key part of your treatment and safety. Be sure to make and go to all appointments, and call your doctor if you are having problems. It's also a good idea to know your test results and keep a list of the medicines you take. Where can you learn more? Go to http://kamaljit-quan.info/. Enter S689 in the search box to learn more about \"Diabetes Blood Sugar Emergencies: Your Action Plan. \" Current as of: May 23, 2016 Content Version: 11.2 © 0589-8133 [employer]. Care instructions adapted under license by [employer] (which disclaims liability or warranty for this information). If you have questions about a medical condition or this instruction, always ask your healthcare professional. [address] any warranty or liability for your use of this information. DASH Diet: Care Instructions Your Care Instructions The DASH diet is an eating plan that can help lower your blood pressure. DASH stands for Dietary Approaches to Stop Hypertension. Hypertension is high blood pressure. The DASH diet focuses on eating foods that are high in calcium, potassium, and magnesium. These nutrients can lower blood pressure.  The foods that are highest in these nutrients are fruits, vegetables, low-fat dairy products, nuts, seeds, and legumes. But taking calcium, potassium, and magnesium supplements instead of eating foods that are high in those nutrients does not have the same effect. The DASH diet also includes whole grains, fish, and poultry. The DASH diet is one of several lifestyle changes your doctor may recommend to lower your high blood pressure. Your doctor may also want you to decrease the amount of sodium in your diet. Lowering sodium while following the DASH diet can lower blood pressure even further than just the DASH diet alone. Follow-up care is a key part of your treatment and safety. Be sure to make and go to all appointments, and call your doctor if you are having problems. It's also a good idea to know your test results and keep a list of the medicines you take. How can you care for yourself at home? Following the DASH diet · Eat 4 to 5 servings of fruit each day. A serving is 1 medium-sized piece of fruit, ½ cup chopped or canned fruit, 1/4 cup dried fruit, or 4 ounces (½ cup) of fruit juice. Choose fruit more often than fruit juice. · Eat 4 to 5 servings of vegetables each day. A serving is 1 cup of lettuce or raw leafy vegetables, ½ cup of chopped or cooked vegetables, or 4 ounces (½ cup) of vegetable juice. Choose vegetables more often than vegetable juice. · Get 2 to 3 servings of low-fat and fat-free dairy each day. A serving is 8 ounces of milk, 1 cup of yogurt, or 1 ½ ounces of cheese. · Eat 6 to 8 servings of grains each day. A serving is 1 slice of bread, 1 ounce of dry cereal, or ½ cup of cooked rice, pasta, or cooked cereal. Try to choose whole-grain products as much as possible. · Limit lean meat, poultry, and fish to 2 servings each day. A serving is 3 ounces, about the size of a deck of cards. · Eat 4 to 5 servings of nuts, seeds, and legumes (cooked dried beans, lentils, and split peas) each week.  A serving is 1/3 cup of nuts, 2 tablespoons of seeds, or ½ cup of cooked beans or peas. · Limit fats and oils to 2 to 3 servings each day. A serving is 1 teaspoon of vegetable oil or 2 tablespoons of salad dressing. · Limit sweets and added sugars to 5 servings or less a week. A serving is 1 tablespoon jelly or jam, ½ cup sorbet, or 1 cup of lemonade. · Eat less than 2,300 milligrams (mg) of sodium a day. If you limit your sodium to 1,500 mg a day, you can lower your blood pressure even more. Tips for success · Start small. Do not try to make dramatic changes to your diet all at once. You might feel that you are missing out on your favorite foods and then be more likely to not follow the plan. Make small changes, and stick with them. Once those changes become habit, add a few more changes. · Try some of the following: ¨ Make it a goal to eat a fruit or vegetable at every meal and at snacks. This will make it easy to get the recommended amount of fruits and vegetables each day. ¨ Try yogurt topped with fruit and nuts for a snack or healthy dessert. ¨ Add lettuce, tomato, cucumber, and onion to sandwiches. ¨ Combine a ready-made pizza crust with low-fat mozzarella cheese and lots of vegetable toppings. Try using tomatoes, squash, spinach, broccoli, carrots, cauliflower, and onions. ¨ Have a variety of cut-up vegetables with a low-fat dip as an appetizer instead of chips and dip. ¨ Sprinkle sunflower seeds or chopped almonds over salads. Or try adding chopped walnuts or almonds to cooked vegetables. ¨ Try some vegetarian meals using beans and peas. Add garbanzo or kidney beans to salads. Make burritos and tacos with mashed hurtado beans or black beans. Where can you learn more? Go to http://kamaljit-quan.info/. Enter D898 in the search box to learn more about \"DASH Diet: Care Instructions. \" Current as of: March 23, 2016 Content Version: 11.2 © 6675-9559 [employer].  Care instructions adapted under license by [address] (which disclaims liability or warranty for this information). If you have questions about a medical condition or this instruction, always ask your healthcare professional. [address] any warranty or liability for your use of this information. Introducing [facility]! Nikhil Murphy introduces [employer] patient portal. Now you can access parts of your medical record, email your doctor's office, and request medication refills online. 1. In your internet browser, go to [email or web address]. [email or web address]! Sports 2. Click on the First Time User? Click Here link in the Sign In box. You will see the New Member Sign Up page. 3. Enter your [employer] Access Code exactly as it appears below. You will not need to use this code after youve completed the sign-up process. If you do not sign up before the expiration date, you must request a new code. · [employer] Access Code: [email or web address] Expires: 7/3/2017  6:31 PM 
 
4. Enter the last four digits of your Social Security Number (xxxx) and Date of Birth (mm/dd/yyyy) as indicated and click Submit. You will be taken to the next sign-up page. 5. Create a V[employer] ID. This will be your [employer] login ID and cannot be changed, so think of one that is secure and easy to remember. 6. Create a [employer] password. You can change your password at any time. 7. Enter your Password Reset Question and Answer. This can be used at a later time if you forget your password. 8. Enter your e-mail address. You will receive e-mail notification when new information is available in [address] 9. Click Sign Up. You can now view and download portions of your medical record. 10. Click the Download Summary menu link to download a portable copy of your medical information. If you have questions, please visit the Frequently Asked Questions section of the [employer] website.  Remember, [employer] is NOT to be used for urgent needs. For medical emergencies, dial 911. Now available from your iPhone and Android! Please provide this summary of care documentation to your next provider. Your primary care clinician is listed as Britta Oppenheim. If you have any questions after today's visit, please call [phone].
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
## 2017-05-10 NOTE — PATIENT INSTRUCTIONS
Noninsulin Medicines for Type 2 Diabetes: Care Instructions  Your Care Instructions  There are different types of noninsulin medicines for diabetes. Each works in a different way to help you control your blood sugar. Some types help your body make insulin to lower your blood sugar. Others lower how much insulin your body needs. Some can slow how quickly your body digests sugars. And some can remove extra glucose through your urine. Some of these medicines are:  · Alpha-glucosidase inhibitors. These keep starches from breaking down. This means that they lower the amount of glucose absorbed when you eat. They do not help your body make more insulin. So they will not cause low blood sugar unless they are used with other medicines for diabetes. They include acarbose and miglitol. · DPP-4 inhibitors. These help your body increase the level of insulin after eating. They also help your body make less of a hormone that raises blood sugar. They include linagliptin, saxagliptin, and sitagliptin. · Incretin hormones (GLP-1 receptor agonists). Your body makes a protein that can raise your insulin level, lower your blood sugar, and make you less hungry. You can inject hormone medicines that work the same way as this protein. They include exenatide and liraglutide. · Meglitinides. These help your body release insulin. They also help slow how your body digests sugars. In this way, they prevent your blood sugar from rising too fast after you eat. They include nateglinide and repaglinide. · Metformin. This lowers how much glucose your liver makes. And it helps you respond better to insulin. It also lowers the amount of stored sugar that your liver releases when you are not eating. · Sodium glucose co-transporter 2 inhibitors (SGLT2 inhibitors). These help to remove extra glucose through your urine. They may also help some people lose weight. They include canagliflozin, dapagliflozin, and empagliflozin. · Sulfonylureas. These help your body release more insulin. Some work for many hours and can cause low blood sugar if you don't eat as you expected. They include glipizide and glyburide. · Thiazolidinediones. These reduce the amount of blood glucose. They also help you respond better to insulin. They include pioglitazone and rosiglitazone. You may need to take more than one medicine for diabetes. Two or more medicines may work better to lower your blood sugar level than just one medicine. Follow-up care is a key part of your treatment and safety. Be sure to make and go to all appointments, and call your doctor if you are having problems. It's also a good idea to know your test results and keep a list of the medicines you take. How can you care for yourself at home? · Eat a healthy diet. Get some exercise each day. This may help you to reduce how much medicine you need. · Do not take other prescription or over-the-counter medicines, vitamins, herbal products, or supplements without talking to your doctor first. Some medicines for type 2 diabetes can cause problems with other medicines or supplements. · Tell your doctor if you plan to get pregnant. Some of these drugs are not safe for pregnant women. · Be safe with medicines. Take your medicines exactly as prescribed. Meglitinides or sulfonylureas can cause your blood sugar to drop very low. Call your doctor if you think you are having a problem with your medicine. · Check your blood sugar levels often. You can use a glucose monitor. Keeping track can help you know how certain foods, activities, and medicines affect your blood sugar. And it can help you keep your blood sugar from getting so low that it's not safe. When should you call for help? Call 911 anytime you think you may need emergency care. For example, call if:  · You passed out (lost consciousness), or you suddenly become very sleepy or confused.  (You may have very low blood sugar.)  · You have symptoms of high blood sugar, such as:  ¨ Blurred vision. ¨ Trouble staying awake or being woken up. ¨ Fast, deep breathing. ¨ Breath that smells fruity. ¨ Belly pain, not feeling hungry, and vomiting. ¨ Feeling confused. Call your doctor now or seek immediate medical care if:  · You are sick and cannot control your blood sugar. · You have been vomiting or have had diarrhea for more than 6 hours. · Your blood sugar stays higher than the level your doctor has set for you. · You have symptoms of low blood sugar, such as:  ¨ Sweating. ¨ Feeling nervous, shaky, and weak. ¨ Extreme hunger and slight nausea. ¨ Dizziness and headache. ¨ Blurred vision. ¨ Confusion. Watch closely for changes in your health, and be sure to contact your doctor if:  · You have a hard time knowing when your blood sugar is low. · You have trouble keeping your blood sugar in the target range. · You often have problems controlling your blood sugar. · You have symptoms of long-term diabetes problems, such as:  ¨ New vision changes. ¨ New pain, numbness, or tingling in your hands or feet. ¨ Skin problems. Where can you learn more? Go to http://kamaljitNextPoint Networksquan.info/. Enter H153 in the search box to learn more about \"Noninsulin Medicines for Type 2 Diabetes: Care Instructions. \"  Current as of: August 3, 2016  Content Version: 11.2  © 2373-5956 [employer]. Care instructions adapted under license by [employer] (which disclaims liability or warranty for this information). If you have questions about a medical condition or this instruction, always ask your healthcare professional. [place] [address] any warranty or liability for your use of this information. Learning About Diabetes Food Guidelines  Your Care Instructions  Meal planning is important to manage diabetes. It helps keep your blood sugar at a target level (which you set with your doctor). You don't have to eat special foods. You can eat what your family eats, including sweets once in a while. But you do have to pay attention to how often you eat and how much you eat of certain foods. You may want to work with a dietitian or a certified diabetes educator (CDE) to help you plan meals and snacks. A dietitian or CDE can also help you lose weight if that is one of your goals. What should you know about eating carbs? Managing the amount of carbohydrate (carbs) you eat is an important part of healthy meals when you have diabetes. Carbohydrate is found in many foods. · Learn which foods have carbs. And learn the amounts of carbs in different foods. ¨ Bread, cereal, pasta, and rice have about 15 grams of carbs in a serving. A serving is 1 slice of bread (1 ounce), ½ cup of cooked cereal, or 1/3 cup of cooked pasta or rice. ¨ Fruits have 15 grams of carbs in a serving. A serving is 1 small fresh fruit, such as an apple or orange; ½ of a banana; ½ cup of cooked or canned fruit; ½ cup of fruit juice; 1 cup of melon or raspberries; or 2 tablespoons of dried fruit. ¨ Milk and no-sugar-added yogurt have 15 grams of carbs in a serving. A serving is 1 cup of milk or 2/3 cup of no-sugar-added yogurt. ¨ Starchy vegetables have 15 grams of carbs in a serving. A serving is ½ cup of mashed potatoes or sweet potato; 1 cup winter squash; ½ of a small baked potato; ½ cup of cooked beans; or ½ cup cooked corn or green peas. · Learn how much carbs to eat each day and at each meal. A dietitian or CDE can teach you how to keep track of the amount of carbs you eat. This is called carbohydrate counting. · If you are not sure how to count carbohydrate grams, use the Plate Method to plan meals. It is a good, quick way to make sure that you have a balanced meal. It also helps you spread carbs throughout the day. ¨ Divide your plate by types of foods.  Put non-starchy vegetables on half the plate, meat or other protein food on one-quarter of the plate, and a grain or starchy vegetable in the final quarter of the plate. To this you can add a small piece of fruit and 1 cup of milk or yogurt, depending on how many carbs you are supposed to eat at a meal.  · Try to eat about the same amount of carbs at each meal. Do not \"save up\" your daily allowance of carbs to eat at one meal.  · Proteins have very little or no carbs per serving. Examples of proteins are beef, chicken, turkey, fish, eggs, tofu, cheese, cottage cheese, and peanut butter. A serving size of meat is 3 ounces, which is about the size of a deck of cards. Examples of meat substitute serving sizes (equal to 1 ounce of meat) are 1/4 cup of cottage cheese, 1 egg, 1 tablespoon of peanut butter, and ½ cup of tofu. How can you eat out and still eat healthy? · Learn to estimate the serving sizes of foods that have carbohydrate. If you measure food at home, it will be easier to estimate the amount in a serving of restaurant food. · If the meal you order has too much carbohydrate (such as potatoes, corn, or baked beans), ask to have a low-carbohydrate food instead. Ask for a salad or green vegetables. · If you use insulin, check your blood sugar before and after eating out to help you plan how much to eat in the future. · If you eat more carbohydrate at a meal than you had planned, take a walk or do other exercise. This will help lower your blood sugar. What else should you know? · Limit saturated fat, such as the fat from meat and dairy products. This is a healthy choice because people who have diabetes are at higher risk of heart disease. So choose lean cuts of meat and nonfat or low-fat dairy products. Use olive or canola oil instead of butter or shortening when cooking. · Don't skip meals. Your blood sugar may drop too low if you skip meals and take insulin or certain medicines for diabetes. · Check with your doctor before you drink alcohol. Alcohol can cause your blood sugar to drop too low.  Alcohol can also cause a bad reaction if you take certain diabetes medicines. Follow-up care is a key part of your treatment and safety. Be sure to make and go to all appointments, and call your doctor if you are having problems. It's also a good idea to know your test results and keep a list of the medicines you take. Where can you learn more? Go to http://kamaljit-quan.info/. Enter C860 in the search box to learn more about \"Learning About Diabetes Food Guidelines. \"  Current as of: May 23, 2016  Content Version: 11.2  © 8740-9697 [employer]. Care instructions adapted under license by [employer] (which disclaims liability or warranty for this information). If you have questions about a medical condition or this instruction, always ask your healthcare professional. [address] any warranty or liability for your use of this information. Learning About Meal Planning for Diabetes  Why plan your meals? Meal planning can be a key part of managing diabetes. Planning meals and snacks with the right balance of carbohydrate, protein, and fat can help you keep your blood sugar at the target level you set with your doctor. You don't have to eat special foods. You can eat what your family eats, including sweets once in a while. But you do have to pay attention to how often you eat and how much you eat of certain foods. You may want to work with a dietitian or a certified diabetes educator. He or she can give you tips and meal ideas and can answer your questions about meal planning. This health professional can also help you reach a healthy weight if that is one of your goals. What plan is right for you? Your dietitian or diabetes educator may suggest that you start with the plate format or carbohydrate counting. The plate format  The plate format is a simple way to help you manage how you eat.  You plan meals by learning how much space each food should take on a plate. Using the plate format helps you spread carbohydrate throughout the day. It can make it easier to keep your blood sugar level within your target range. It also helps you see if you're eating healthy portion sizes. To use the plate format, you put non-starchy vegetables on half your plate. Add meat or meat substitutes on one-quarter of the plate. Put a grain or starchy vegetable (such as brown rice or a potato) on the final quarter of the plate. You can add a small piece of fruit and some low-fat or fat-free milk or yogurt, depending on your carbohydrate goal for each meal.  Here are some tips for using the plate format:  · Make sure that you are not using an oversized plate. A 9-inch plate is best. Many restaurants use larger plates. · Get used to using the plate format at home. Then you can use it when you eat out. · Write down your questions about using the plate format. Talk to your doctor, a dietitian, or a diabetes educator about your concerns. Carbohydrate counting  With carbohydrate counting, you plan meals based on the amount of carbohydrate in each food. Carbohydrate raises blood sugar higher and more quickly than any other nutrient. It is found in desserts, breads and cereals, and fruit. It's also found in starchy vegetables such as potatoes and corn, grains such as rice and pasta, and milk and yogurt. Spreading carbohydrate throughout the day helps keep your blood sugar levels within your target range. Your daily amount depends on several things, including your weight, how active you are, which diabetes medicines you take, and what your goals are for your blood sugar levels. A registered dietitian or diabetes educator can help you plan how much carbohydrate to include in each meal and snack. A guideline for your daily amount of carbohydrate is:  · 45 to 60 grams at each meal. That's about the same as 3 to 4 carbohydrate servings. · 15 to 20 grams at each snack.  That's about the same as 1 carbohydrate serving. The Nutrition Facts label on packaged foods tells you how much carbohydrate is in a serving of the food. First, look at the serving size on the food label. Is that the amount you eat in a serving? All of the nutrition information on a food label is based on that serving size. So if you eat more or less than that, you'll need to adjust the other numbers. Total carbohydrate is the next thing you need to look for on the label. If you count carbohydrate servings, one serving of carbohydrate is 15 grams. For foods that don't come with labels, such as fresh fruits and vegetables, you'll need a guide that lists carbohydrate in these foods. Ask your doctor, dietitian, or diabetes educator about books or other nutrition guides you can use. If you take insulin, you need to know how many grams of carbohydrate are in a meal. This lets you know how much rapid-acting insulin to take before you eat. If you use an insulin pump, you get a constant rate of insulin during the day. So the pump must be programmed at meals to give you extra insulin to cover the rise in blood sugar after meals. When you know how much carbohydrate you will eat, you can take the right amount of insulin. Or, if you always use the same amount of insulin, you need to make sure that you eat the same amount of carbohydrate at meals. If you need more help to understand carbohydrate counting and food labels, ask your doctor, dietitian, or diabetes educator. How do you get started with meal planning? Here are some tips to get started:  · Plan your meals a week at a time. Don't forget to include snacks too. · Use cookbooks or online recipes to plan several main meals. Plan some quick meals for busy nights. You also can double some recipes that freeze well. Then you can save half for other busy nights when you don't have time to cook. · Make sure you have the ingredients you need for your recipes.  If you're running low on basic items, put these items on your shopping list too. · List foods that you use to make breakfasts, lunches, and snacks. List plenty of fruits and vegetables. · Post this list on the refrigerator. Add to it as you think of more things you need. · Take the list to the store to do your weekly shopping. Follow-up care is a key part of your treatment and safety. Be sure to make and go to all appointments, and call your doctor if you are having problems. It's also a good idea to know your test results and keep a list of the medicines you take. Where can you learn more? Go to http://kamaljit-quan.info/. Edmundo Hampton in the search box to learn more about \"Learning About Meal Planning for Diabetes. \"  Current as of: October 26, 2016  Content Version: 11.2  © 8044-4871 [employer]. Care instructions adapted under license by [employer] (which disclaims liability or warranty for this information). If you have questions about a medical condition or this instruction, always ask your healthcare professional. [place] [address] any warranty or liability for your use of this information. Diabetes Foot Health: Care Instructions  Your Care Instructions    When you have diabetes, your feet need extra care and attention. Diabetes can damage the nerve endings and blood vessels in your feet, making you less likely to notice when your feet are injured. Diabetes also limits your body's ability to fight infection and get blood to areas that need it. If you get a minor foot injury, it could become an ulcer or a serious infection. With good foot care, you can prevent most of these problems. Caring for your feet can be quick and easy. Most of the care can be done when you are bathing or getting ready for bed. Follow-up care is a key part of your treatment and safety. Be sure to make and go to all appointments, and call your doctor if you are having problems.  Its also a good idea to know your test results and keep a list of the medicines you take. How can you care for yourself at home? · Keep your blood sugar close to normal by watching what and how much you eat, monitoring blood sugar, taking medicines if prescribed, and getting regular exercise. · Do not smoke. Smoking affects blood flow and can make foot problems worse. If you need help quitting, talk to your doctor about stop-smoking programs and medicines. These can increase your chances of quitting for good. · Eat a diet that is low in fats. High fat intake can cause fat to build up in your blood vessels and decrease blood flow. · Inspect your feet daily for blisters, cuts, cracks, or sores. If you cannot see well, use a mirror or have someone help you. · Take care of your feet:  [facility] your feet every day. Use warm (not hot) water. Check the water temperature with your wrists or other part of your body, not your feet. ¨ Dry your feet well. Pat them dry. Do not rub the skin on your feet too hard. Dry well between your toes. If the skin on your feet stays moist, bacteria or a fungus can grow, which can lead to infection. ¨ Keep your skin soft. Use moisturizing skin cream to keep the skin on your feet soft and prevent calluses and cracks. But do not put the cream between your toes, and stop using any cream that causes a rash. ¨ Clean underneath your toenails carefully. Do not use a sharp object to clean underneath your toenails. Use the blunt end of a nail file or other rounded tool. ¨ Trim and file your toenails straight across to prevent ingrown toenails. Use a nail clipper, not scissors. Use an emery board to smooth the edges. · Change socks daily. Socks without seams are best, because seams often rub the feet. You can find socks for people with diabetes from specialty catalogs. · Look inside your shoes every day for things like gravel or torn linings, which could cause blisters or sores.   · Buy shoes that fit well:  ¨ Look for shoes that have plenty of space around the toes. This helps prevent bunions and blisters. ¨ Try on shoes while wearing the kind of socks you will usually wear with the shoes. ¨ Avoid plastic shoes. They may rub your feet and cause blisters. Good shoes should be made of materials that are flexible and breathable, such as leather or cloth. ¨ Break in new shoes slowly by wearing them for no more than an hour a day for several days. Take extra time to check your feet for red areas, blisters, or other problems after you wear new shoes. · Do not go barefoot. Do not wear sandals, and do not wear shoes with very thin soles. Thin soles are easy to puncture. They also do not protect your feet from hot pavement or cold weather. · Have your doctor check your feet during each visit. If you have a foot problem, see your doctor. Do not try to treat an early foot problem at home. Home remedies or treatments that you can buy without a prescription (such as corn removers) can be harmful. · Always get early treatment for foot problems. A minor irritation can lead to a major problem if not properly cared for early. When should you call for help? Call your doctor now or seek immediate medical care if:  · You have a foot sore, an ulcer or break in the skin that is not healing after 4 days, bleeding corns or calluses, or an ingrown toenail. · You have blue or black areas, which can mean bruising or blood flow problems. · You have peeling skin or tiny blisters between your toes or cracking or oozing of the skin. · You have a fever for more than 24 hours and a foot sore. · You have new numbness or tingling in your feet that does not go away after you move your feet or change positions. · You have unexplained or unusual swelling of the foot or ankle. Watch closely for changes in your health, and be sure to contact your doctor if:  · You cannot do proper foot care. Where can you learn more?   Go to http://kamaljit-quan.info/. Enter A739 in the search box to learn more about \"Diabetes Foot Health: Care Instructions. \"  Current as of: May 23, 2016  Content Version: 11.2  © 4175-7040 [employer]. Care instructions adapted under license by [employer] (which disclaims liability or warranty for this information). If you have questions about a medical condition or this instruction, always ask your healthcare professional. [address] any warranty or liability for your use of this information. Learning About Diabetes and Your Teeth  How does diabetes affect your teeth and gums? When you have diabetes, managing blood sugar levels and taking good care of your teeth and gums are both important. When blood sugar levels are high, there's a greater risk for:  · Gum (periodontal) disease. · Tooth decay. · Fungal infections in the mouth, like thrush. · Dry mouth, or xerostomia (say \"zee-ruh-STO-cedrick-uh\"). The mouth needs saliva to neutralize the acids in your mouth. These acids can lead to gum disease and tooth decay. Keeping your blood sugar levels in your target range can help prevent problems with the teeth and gums. If you have any problems with your teeth or gums, see your dentist.  How do you care for your teeth and gums when you have diabetes? · Brush your teeth twice a day. · Floss daily. Make sure to press the floss against your teeth and not your gums. · Check each day for areas where your gums might be red or painful. Be sure to let your dentist know of any sores in your mouth. · See your dentist regularly for professional cleaning of your teeth and to look for gum problems. Many dentists recommend getting checkups twice a year. Remind your dentist that you have diabetes before any work is done. · Don't smoke or use smokeless tobacco. Tobacco use with diabetes can lead to a greater risk of severe gum disease.  If you need help quitting, talk to your doctor about stop-smoking programs and medicines. These can increase your chances of quitting for good. Follow-up care is a key part of your treatment and safety. Be sure to make and go to all appointments, and call your doctor if you are having problems. It's also a good idea to know your test results and keep a list of the medicines you take. Where can you learn more? Go to http://kamaljit-quan.info/. Enter J792 in the search box to learn more about \"Learning About Diabetes and Your Teeth. \"  Current as of: May 23, 2016  Content Version: 11.2  © 6259-1320 [employer]. Care instructions adapted under license by [employer] (which disclaims liability or warranty for this information). If you have questions about a medical condition or this instruction, always ask your healthcare professional. [address] any warranty or liability for your use of this information. Diabetes Blood Sugar Emergencies: Your Action Plan  How can you prevent a blood sugar emergency? An important part of living with diabetes is keeping your blood sugar in your target range. You'll need to know what to do if it's too high or too low. Managing your blood sugar levels helps you avoid emergencies. This care sheet will teach you about the signs of high and low blood sugar. It will help you make an action plan with your doctor for when these signs occur. Low blood sugar is more likely to happen if you take certain medicines for diabetes. It can also happen if you skip a meal, drink alcohol, or exercise more than usual.  You may get high blood sugar if you eat differently than you normally do. One example is eating more carbohydrate than usual. Having a cold, the flu, or other sudden illness can also cause high blood sugar levels. Levels can also rise if you miss a dose of medicine. Any change in how you take your medicine may affect your blood sugar level.  So it's important to work with your doctor before you make any changes. Check your blood sugar  Work with your doctor to fill in the blank spaces below that apply to you. Track your levels, know your target range, and write down ways you can get your blood sugar back in your target range. A log book can help you track your levels. Take the book to all of your medical appointments. · Check your blood sugar _____ times a day, at these times:________________________________________________. (For example: Before meals, at bedtime, before exercise, during exercise, other.)  · Your blood sugar target range before a meal is ___________________. Your blood sugar target range after a meal is _______________________. · Do this--___________________________________________________--to get your blood sugar back within your safe range if your blood sugar results are _________________________________________. (For example: Less than 70 or above 250 mg/dL.)  Call your doctor when your blood sugar results are ___________________________________. (For example: Less than 70 or above 250 mg/dL.)  What are the symptoms of low and high blood sugar? Common symptoms of low blood sugar are sweating and feeling shaky, weak, hungry, or confused. Symptoms can start quickly. Common symptoms of high blood sugar are feeling very thirsty or very hungry. You may also pass urine more often than usual. You may have blurry vision and may lose weight without trying. But some people may have high or low blood sugar without having any symptoms. That's a good reason to check your blood sugar on a regular schedule. What should you do if you have symptoms? Work with your doctor to fill in the blank spaces below that apply to you. Low blood sugar  If you have symptoms of low blood sugar, check your blood sugar. If it's below _____ (for example, below 70), eat or drink a quick-sugar food that has about 15 grams of carbohydrate.  Your goal is to get your level back to your safe range. Check your blood sugar again 15 minutes later. If it's still not in your target range, take another 15 grams of carbohydrate and check your blood sugar again in 15 minutes. Repeat this until you reach your target. Then go back to your regular testing schedule. When you have low blood sugar, it's best to stop or reduce any physical activity until your blood sugar is back in your target range and is stable. If you must stay active, eat or drink 30 grams of carbohydrate. Then check your blood sugar again in 15 minutes. If it's not in your target range, take another 30 grams of carbohydrates. Check your blood sugar again in 15 minutes. Keep doing this until you reach your target. You can then go back to your regular testing schedule. If your symptoms or blood sugar levels are getting worse or have not improved after 15 minutes, seek medical care right away. Here are some examples of quick-sugar foods with 15 grams of carbohydrate:  · 3 or 4 glucose tablets  · 1 tube of glucose gel  · Hard candy (such as 3 Jolly Ranchers or 5 to 7 Life Savers)  · ½ cup to ¾ cup (4 to 6 ounces) of fruit juice or regular (not diet) soda  High blood sugar  If you have symptoms of high blood sugar, check your blood sugar. Your goal is to get your level back to your target range. If it's above ______ (for example, above 250), follow these steps:  · If you missed a dose of your diabetes medicine, take it now. Take only the amount of medicine that you have been prescribed. Do not take more or less medicine. · Give yourself insulin if your doctor has prescribed it for high blood sugar. · Test for ketones, if the doctor told you to do so. If the results of the ketone test show a moderate-to-large amount of ketones, call the doctor for advice. · Wait 30 minutes after you take the extra insulin or the missed medicine. Check your blood sugar again.   If your symptoms or blood sugar levels are getting worse or have not improved after taking these steps, seek medical care right away. Follow-up care is a key part of your treatment and safety. Be sure to make and go to all appointments, and call your doctor if you are having problems. It's also a good idea to know your test results and keep a list of the medicines you take. Where can you learn more? Go to http://kamaljit-quan.info/. Enter Q427 in the search box to learn more about \"Diabetes Blood Sugar Emergencies: Your Action Plan. \"  Current as of: May 23, 2016  Content Version: 11.2  © 2270-2852 [employer]. Care instructions adapted under license by [employer] (which disclaims liability or warranty for this information). If you have questions about a medical condition or this instruction, always ask your healthcare professional. [address] any warranty or liability for your use of this information. DASH Diet: Care Instructions  Your Care Instructions  The DASH diet is an eating plan that can help lower your blood pressure. DASH stands for Dietary Approaches to Stop Hypertension. Hypertension is high blood pressure. The DASH diet focuses on eating foods that are high in calcium, potassium, and magnesium. These nutrients can lower blood pressure. The foods that are highest in these nutrients are fruits, vegetables, low-fat dairy products, nuts, seeds, and legumes. But taking calcium, potassium, and magnesium supplements instead of eating foods that are high in those nutrients does not have the same effect. The DASH diet also includes whole grains, fish, and poultry. The DASH diet is one of several lifestyle changes your doctor may recommend to lower your high blood pressure. Your doctor may also want you to decrease the amount of sodium in your diet. Lowering sodium while following the DASH diet can lower blood pressure even further than just the DASH diet alone.   Follow-up care is a key part of your treatment and safety. Be sure to make and go to all appointments, and call your doctor if you are having problems. It's also a good idea to know your test results and keep a list of the medicines you take. How can you care for yourself at home? Following the DASH diet  · Eat 4 to 5 servings of fruit each day. A serving is 1 medium-sized piece of fruit, ½ cup chopped or canned fruit, 1/4 cup dried fruit, or 4 ounces (½ cup) of fruit juice. Choose fruit more often than fruit juice. · Eat 4 to 5 servings of vegetables each day. A serving is 1 cup of lettuce or raw leafy vegetables, ½ cup of chopped or cooked vegetables, or 4 ounces (½ cup) of vegetable juice. Choose vegetables more often than vegetable juice. · Get 2 to 3 servings of low-fat and fat-free dairy each day. A serving is 8 ounces of milk, 1 cup of yogurt, or 1 ½ ounces of cheese. · Eat 6 to 8 servings of grains each day. A serving is 1 slice of bread, 1 ounce of dry cereal, or ½ cup of cooked rice, pasta, or cooked cereal. Try to choose whole-grain products as much as possible. · Limit lean meat, poultry, and fish to 2 servings each day. A serving is 3 ounces, about the size of a deck of cards. · Eat 4 to 5 servings of nuts, seeds, and legumes (cooked dried beans, lentils, and split peas) each week. A serving is 1/3 cup of nuts, 2 tablespoons of seeds, or ½ cup of cooked beans or peas. · Limit fats and oils to 2 to 3 servings each day. A serving is 1 teaspoon of vegetable oil or 2 tablespoons of salad dressing. · Limit sweets and added sugars to 5 servings or less a week. A serving is 1 tablespoon jelly or jam, ½ cup sorbet, or 1 cup of lemonade. · Eat less than 2,300 milligrams (mg) of sodium a day. If you limit your sodium to 1,500 mg a day, you can lower your blood pressure even more. Tips for success  · Start small. Do not try to make dramatic changes to your diet all at once.  You might feel that you are missing out on your favorite foods and then be more likely to not follow the plan. Make small changes, and stick with them. Once those changes become habit, add a few more changes. · Try some of the following:  ¨ Make it a goal to eat a fruit or vegetable at every meal and at snacks. This will make it easy to get the recommended amount of fruits and vegetables each day. ¨ Try yogurt topped with fruit and nuts for a snack or healthy dessert. ¨ Add lettuce, tomato, cucumber, and onion to sandwiches. ¨ Combine a ready-made pizza crust with low-fat mozzarella cheese and lots of vegetable toppings. Try using tomatoes, squash, spinach, broccoli, carrots, cauliflower, and onions. ¨ Have a variety of cut-up vegetables with a low-fat dip as an appetizer instead of chips and dip. ¨ Sprinkle sunflower seeds or chopped almonds over salads. Or try adding chopped walnuts or almonds to cooked vegetables. ¨ Try some vegetarian meals using beans and peas. Add garbanzo or kidney beans to salads. Make burritos and tacos with mashed hurtado beans or black beans. Where can you learn more? Go to http://kamaljit-quan.info/. Enter G479 in the search box to learn more about \"DASH Diet: Care Instructions. \"  Current as of: March 23, 2016  Content Version: 11.2  © 4325-5642 [employer]. Care instructions adapted under license by [employer] (which disclaims liability or warranty for this information). If you have questions about a medical condition or this instruction, always ask your healthcare professional. [place] [address] any warranty or liability for your use of this information.

## 2017-05-11 ENCOUNTER — TELEPHONE (OUTPATIENT)
Dept: FAMILY MEDICINE CLINIC | Age: 72
End: 2017-05-11

## 2017-05-11 NOTE — TELEPHONE ENCOUNTER
Pt returned call notified. Said she wanted to try Trulicity since it was once week, but will give this shot and let me know.

## 2017-05-12 ENCOUNTER — TELEPHONE (OUTPATIENT)
Dept: FAMILY MEDICINE CLINIC | Age: 72
End: 2017-05-12

## 2017-05-12 DIAGNOSIS — E11.65 POORLY CONTROLLED DIABETES MELLITUS (HCC): Primary | ICD-10-CM

## 2017-05-15 NOTE — TELEPHONE ENCOUNTER
Pt tried Januvia and could not tolerate, caused upset stomach and diarrhea. Still interested to see if insurance covers Trulicity. Also would like to cut back on metformin, could not tolerate 1000mg XL daily.

## 2017-05-17 RX ORDER — METFORMIN HYDROCHLORIDE 500 MG/1
500 TABLET, EXTENDED RELEASE ORAL 2 TIMES DAILY
Qty: 60 TAB | Refills: 2 | Status: SHIPPED | OUTPATIENT
Start: 2017-05-17 | End: 2017-07-28 | Stop reason: ALTCHOICE

## 2017-05-17 NOTE — TELEPHONE ENCOUNTER
Note: discussed tx with Dr. Chuck Alcantar who agrees, since pt refuses insulin. She is aware of how poorly controlled her DM is and the potential health risks. She would like to continue trying this regimen.

## 2017-05-18 DIAGNOSIS — E11.21 TYPE 2 DIABETES MELLITUS WITH DIABETIC NEPHROPATHY, WITHOUT LONG-TERM CURRENT USE OF INSULIN (HCC): ICD-10-CM

## 2017-05-18 DIAGNOSIS — E11.9 TYPE 2 DIABETES MELLITUS WITHOUT COMPLICATION, WITHOUT LONG-TERM CURRENT USE OF INSULIN (HCC): ICD-10-CM

## 2017-05-18 RX ORDER — INSULIN PUMP SYRINGE, 3 ML
EACH MISCELLANEOUS
Qty: 1 KIT | Refills: 0 | Status: SHIPPED | OUTPATIENT
Start: 2017-05-18 | End: 2017-07-12 | Stop reason: SDUPTHER

## 2017-05-18 RX ORDER — LANCETS
EACH MISCELLANEOUS
Qty: 1 EACH | Refills: 11 | Status: SHIPPED | OUTPATIENT
Start: 2017-05-18 | End: 2018-09-06 | Stop reason: SDUPTHER

## 2017-07-12 DIAGNOSIS — E11.21 TYPE 2 DIABETES MELLITUS WITH DIABETIC NEPHROPATHY, WITHOUT LONG-TERM CURRENT USE OF INSULIN (HCC): ICD-10-CM

## 2017-07-12 RX ORDER — BLOOD-GLUCOSE METER
EACH MISCELLANEOUS
Qty: 1 KIT | Refills: 0 | Status: SHIPPED | OUTPATIENT
Start: 2017-07-12 | End: 2017-09-21 | Stop reason: SDUPTHER

## 2017-07-20 DIAGNOSIS — B35.1 ONYCHOMYCOSIS: ICD-10-CM

## 2017-07-20 DIAGNOSIS — B35.3 TINEA PEDIS OF BOTH FEET: ICD-10-CM

## 2017-07-20 RX ORDER — CHLORPHENIRAMINE MALEATE 4 MG
TABLET ORAL 2 TIMES DAILY
Qty: 45 G | Refills: 1 | Status: SHIPPED | OUTPATIENT
Start: 2017-07-20 | End: 2018-03-20 | Stop reason: SDUPTHER

## 2017-07-28 ENCOUNTER — CLINICAL SUPPORT (OUTPATIENT)
Dept: CARDIOLOGY CLINIC | Age: 72
End: 2017-07-28

## 2017-07-28 ENCOUNTER — OFFICE VISIT (OUTPATIENT)
Dept: CARDIOLOGY CLINIC | Age: 72
End: 2017-07-28

## 2017-07-28 VITALS
WEIGHT: 157 LBS | OXYGEN SATURATION: 93 % | SYSTOLIC BLOOD PRESSURE: 136 MMHG | HEART RATE: 70 BPM | HEIGHT: 63 IN | DIASTOLIC BLOOD PRESSURE: 72 MMHG | BODY MASS INDEX: 27.82 KG/M2 | RESPIRATION RATE: 16 BRPM

## 2017-07-28 DIAGNOSIS — I44.1 SECOND DEGREE AV BLOCK: Primary | ICD-10-CM

## 2017-07-28 DIAGNOSIS — I10 BENIGN ESSENTIAL HTN: ICD-10-CM

## 2017-07-28 DIAGNOSIS — I44.1 SECOND DEGREE AV BLOCK: ICD-10-CM

## 2017-07-28 DIAGNOSIS — Z95.0 S/P CARDIAC PACEMAKER PROCEDURE: ICD-10-CM

## 2017-07-28 DIAGNOSIS — I45.9 HEART BLOCK: ICD-10-CM

## 2017-07-28 DIAGNOSIS — E11.21 TYPE 2 DIABETES MELLITUS WITH DIABETIC NEPHROPATHY, WITHOUT LONG-TERM CURRENT USE OF INSULIN (HCC): ICD-10-CM

## 2017-07-28 DIAGNOSIS — Z95.0 S/P CARDIAC PACEMAKER PROCEDURE: Primary | ICD-10-CM

## 2017-07-28 RX ORDER — LOSARTAN POTASSIUM 50 MG/1
50 TABLET ORAL DAILY
Qty: 30 TAB | Refills: 5 | Status: SHIPPED | OUTPATIENT
Start: 2017-07-28 | End: 2017-11-17 | Stop reason: SDUPTHER

## 2017-07-28 NOTE — PROGRESS NOTES
PATIENT ID VERIFIED WITH TWO PATIENT IDENTIFIERS. MEDICATION REVIEWED AND APPROVED BY DR. Lear Phoenix.

## 2017-07-28 NOTE — MR AVS SNAPSHOT
Visit Information Date & Time Provider Department Dept. Phone Encounter #  
 7/28/2017 11:40 AM Moses Dale, [address] [facility] Cardiology [facility] [phone] [id number] Your Appointments 8/14/2017  8:30 AM  
Any with Gabriela Hameed PA-C  
[address] ([facility]) Appt Note: 3 mo f/u dm $ 15 [address]  
[phone]  
  
   
 [address] [address]  
  
    
 1/26/2018 11:40 AM  
ESTABLISHED PATIENT with Moses Dale MD  
[address] [facility]) Appt Note: 6 mo [employer] PACEMAKER CHECK (Needs appt with [facility] next and be set up on CL) [address] [facility] [phone]  
  
   
 [address] [place] [address] Upcoming Health Maintenance Date Due  
 GLAUCOMA SCREENING Q2Y 6/20/2012 MICROALBUMIN Q1 6/14/2017 LIPID PANEL Q1 6/14/2017 MEDICARE YEARLY EXAM 6/15/2017 INFLUENZA AGE 9 TO ADULT 8/1/2017 HEMOGLOBIN A1C Q6M 10/4/2017 Pneumococcal 65+ Low/Medium Risk (2 of 2 - PPSV23) 1/4/2018 FOOT EXAM Q1 5/10/2018 BREAST CANCER SCRN MAMMOGRAM 1/4/2019 DTaP/Tdap/Td series (2 - Td) 1/4/2027 COLONOSCOPY 1/4/2027 Allergies as of 7/28/2017  Review Complete On: 7/28/2017 By: Warden Edmondson Severity Noted Reaction Type Reactions Codeine High 06/07/2014    Hives, Shortness of Breath Throat closes shut Flowers High 04/05/2017    Anaphylaxis  
 roses Keflex [Cephalexin] High 06/07/2014    Hives, Shortness of Breath Throat closes shut Pcn [Penicillins] High 06/07/2014    Hives, Shortness of Breath Throat closes shut Metformin Medium 05/05/2017    Diarrhea Ciprofloxacin  06/18/2016    Shortness of Breath Morphine  01/04/2017    Angioedema Throat swells Adhesive Tape-silicones Low 51/70/1549    Rash Current Immunizations  Never Reviewed No immunizations on file. Not reviewed this visit You Were Diagnosed With   
  
 Codes Comments Second degree AV block    -  Primary ICD-10-CM: I44.1 ICD-9-CM: 426.13 S/P cardiac pacemaker procedure     ICD-10-CM: Z95.0 ICD-9-CM: V45.01 Benign essential HTN     ICD-10-CM: I10 
ICD-9-CM: 401.1 Type 2 diabetes mellitus with diabetic nephropathy, without long-term current use of insulin (HCC)     ICD-10-CM: E11.21 
ICD-9-CM: 250.40, 583.81 Heart block     ICD-10-CM: I45.9 ICD-9-CM: 426.9 Vitals BP Pulse Resp Height(growth percentile) Weight(growth percentile) SpO2  
 136/72 (BP 1 Location: Right arm, BP Patient Position: Sitting) 70 16 5' 3\" (1.6 m) 157 lb (71.2 kg) 93% BMI OB Status Smoking Status 27.81 kg/m2 Hysterectomy Current Every Day Smoker Vitals History BMI and BSA Data Body Mass Index Body Surface Area  
 27.81 kg/m 2 1.78 m 2 Preferred Pharmacy Pharmacy Name Phone [facility] [address] [place] [address] Your Updated Medication List  
  
   
This list is accurate as of: 7/28/17 12:01 PM.  Always use your most recent med list.  
  
  
  
  
 ascorbic acid (vitamin C) 500 mg tablet Commonly known as:  VITAMIN C Take 500 mg by mouth daily. cholecalciferol 1,000 unit Cap Commonly known as:  VITAMIN D3 Take 1,000 Units by mouth daily. clotrimazole 1 % topical cream  
Commonly known as:  Cb Bowman Apply  to affected area two (2) times a day. PRN  
  
 dulaglutide 0.75 mg/0.5 mL sub-q pen Commonly known as:  TRULICITY  
0.5 mL by SubCUTAneous route every seven (7) days. ferrous sulfate 325 mg (65 mg iron) tablet Take  by mouth Daily (before breakfast). glipiZIDE 10 mg tablet Commonly known as:  GLUCOTROL  
TAKE 1 TABLET TWICE DAILY  
  
 glucose blood VI test strips strip Commonly known as:  ASCENSIA AUTODISC VI, ONE TOUCH ULTRA TEST VI  
TID testing JANUVIA 100 mg tablet Generic drug:  SITagliptin TAKE 1 TABLET EVERY DAY Lancets Misc TID testing.  
  
 losartan 50 mg tablet Commonly known as:  COZAAR Take 1 Tab by mouth daily. multivitamin tablet Commonly known as:  ONE A DAY Take 1 Tab by mouth daily. TRUE METRIX AIR GLUCOSE METER monitoring kit Generic drug:  Blood-Glucose Meter USE AS DIRECTED  
  
 vit K-S-M3-E-omega-3-ala-dha 250-3-50 unit,mg,unit Chew Take 1 Tab by mouth daily. VITAMIN B-12 1,000 mcg tablet Generic drug:  cyanocobalamin Take 1,000 mcg by mouth daily. vitamin E 400 unit capsule Commonly known as:  [address] Take 400 Units by mouth daily. Prescriptions Sent to Pharmacy Refills  
 losartan (COZAAR) 50 mg tablet 5 Sig: Take 1 Tab by mouth daily. Class: Normal  
 Pharmacy: [facility] [address]  Ph #: [phone] Route: Oral  
  
Introducing \[facility] [place] [facility]\"" & HEALTH SERVICES! [employer] introduces "[employer] patient portal. Now you can access parts of your medical record, email your doctor's office, and request medication refills online. 1. In your internet browser, go to [email or web address] 2. Click on the First Time User? Click Here link in the Sign In box. You will see the New Member Sign Up page. 3. Enter your "[employer] Access Code exactly as it appears below. You will not need to use this code after youve completed the sign-up process. If you do not sign up before the expiration date, you must request a new code. · "[employer] Access Code: [email or web address] Expires: 10/3/2017  9:51 AM 
 
4. Enter the last four digits of your Social Security Number (xxxx) and Date of Birth (mm/dd/yyyy) as indicated and click Submit. You will be taken to the next sign-up page. 5. Create a ServiceRelated ID. This will be your ServiceRelated login ID and cannot be changed, so think of one that is secure and easy to remember. 6. Create a ServiceRelated password. You can change your password at any time. 7. Enter your Password Reset Question and Answer. This can be used at a later time if you forget your password. 8. Enter your e-mail address. You will receive e-mail notification when new information is available in [address] 9. Click Sign Up. You can now view and download portions of your medical record. 10. Click the Download Summary menu link to download a portable copy of your medical information. If you have questions, please visit the Frequently Asked Questions section of the ServiceRelated website. Remember, ServiceRelated is NOT to be used for urgent needs. For medical emergencies, dial 911. Now available from your iPhone and Android! Please provide this summary of care documentation to your next provider. Your primary care clinician is listed as Felicity Gonzalez. If you have any questions after today's visit, please call [phone].

## 2017-07-28 NOTE — PROGRESS NOTES
Mira Pérez is a 81 y.o. female is here for routine f/u. No CV sx or complaints. Seeing PCP, taking meds. diploplia had resolved. No pacemaker issues. Still smoking. The patient denies chest pain/ shortness of breath, orthopnea, PND, LE edema, palpitations, syncope, presyncope or fatigue.        Patient Active Problem List    Diagnosis Date Noted    S/P cardiac pacemaker procedure 04/06/2017    Second degree AV block 04/05/2017    Tobacco abuse 04/05/2017    Diplopia 04/05/2017    Benign essential HTN 06/14/2016    Vitamin D deficiency 06/14/2016    Fe deficiency anemia 06/14/2016    Proteinuria     Diabetes ([facility] [address]       Fifi Ramsay PA-C  Past Medical History:   Diagnosis Date    Diabetes ([facility] [address]     Diplopia 4/5/2017    Mobitz (type) II atrioventricular block 4/5/2017    Proteinuria     S/P cardiac pacemaker procedure 4/6/2017 4/6/17 Medtronic dual chamber pacemaker implant    Tobacco abuse 4/5/2017      Past Surgical History:   Procedure Laterality Date    HX APPENDECTOMY      HX CHOLECYSTECTOMY      HX COLONOSCOPY  2014    nl, q 2-3 y    HX GYN      hysterectomy    HX ORTHOPAEDIC  1979    back    HX PACEMAKER  04/06/2017    pacemaker placed on the left side     Allergies   Allergen Reactions    Codeine Hives and Shortness of Breath     Throat closes shut    Flowers Anaphylaxis     roses    Keflex [Cephalexin] Hives and Shortness of Breath     Throat closes shut    Pcn [Penicillins] Hives and Shortness of Breath     Throat closes shut    Metformin Diarrhea    Ciprofloxacin Shortness of Breath    Morphine Angioedema     Throat swells    Adhesive Tape-Silicones Rash      Family History   Problem Relation Age of Onset    COPD Mother     Asthma Mother     Heart Disease Father     Cancer Maternal Grandmother     Stroke Maternal Grandmother     Cancer Maternal Grandfather     Cancer Paternal Grandmother     Heart Disease Paternal Grandfather       Social History     Social History    Marital status: SINGLE     Spouse name: N/A    Number of children: N/A    Years of education: N/A     Occupational History    Not on file. Social History Main Topics    Smoking status: Current Every Day Smoker     Packs/day: 1.00     Types: Cigarettes    Smokeless tobacco: Never Used    Alcohol use Yes      Comment: rare    Drug use: No    Sexual activity: Not Currently     Partners: Male     Other Topics Concern    Not on file     Social History Narrative      Current Outpatient Prescriptions   Medication Sig    losartan (COZAAR) 50 mg tablet Take 1 Tab by mouth daily.  clotrimazole (LOTRIMIN) 1 % topical cream Apply  to affected area two (2) times a day. PRN    TRUE METRIX AIR GLUCOSE METER monitoring kit USE AS DIRECTED    glipiZIDE (GLUCOTROL) 10 mg tablet TAKE 1 TABLET TWICE DAILY    JANUVIA 100 mg tablet TAKE 1 TABLET EVERY DAY    glucose blood VI test strips (ASCENSIA AUTODISC VI, ONE TOUCH ULTRA TEST VI) strip TID testing    Lancets misc TID testing.  dulaglutide (TRULICITY) 8.59 ZN/9.1 mL sub-q pen 0.5 mL by SubCUTAneous route every seven (7) days.  vit Z-H-G2-E-omega-3-ala-dha 250-3-50 unit,mg,unit chew Take 1 Tab by mouth daily.  cholecalciferol (VITAMIN D3) 1,000 unit cap Take 1,000 Units by mouth daily.  ferrous sulfate 325 mg (65 mg iron) tablet Take  by mouth Daily (before breakfast).  vitamin E (AQUA GEMS) 400 unit capsule Take 400 Units by mouth daily.  ascorbic acid (VITAMIN C) 500 mg tablet Take 500 mg by mouth daily.  multivitamin (ONE A DAY) tablet Take 1 Tab by mouth daily.  cyanocobalamin (VITAMIN B-12) 1,000 mcg tablet Take 1,000 mcg by mouth daily. No current facility-administered medications for this visit. Review of Symptoms:    CONST  No weight change. No fever, chills, sweats    ENT No visual changes, URI sx, sore throat    CV  See HPI   RESP  No cough, or sputum, wheezing.  Also see HPI   GI  No abdominal pain or change in bowel habits. No heartburn or dysphagia. No melena or rectal bleeding.   No dysuria, urgency, frequency, hematuria   MSKEL  No joint pain, swelling. No muscle pain. SKIN  No rash or lesions. NEURO  No headache, syncope, or seizure. No weakness, loss of sensation, or paresthesias. PSYCH  No low mood or depression  No anxiety. HE/LYMPH  No easy bruising, abnormal bleeding, or enlarged glands.         Physical ExamPhysical Exam:    Visit Vitals    /72 (BP 1 Location: Right arm, BP Patient Position: Sitting)    Pulse 70    Resp 16    Ht 5' 3\" (1.6 m)    Wt 157 lb (71.2 kg)    SpO2 93%    BMI 27.81 kg/m2     Gen: NAD  HEENT:  PERRL, throat clear  Neck: no adenopathy, no thyromegaly, no JVD   Heart:  Regular,Nl S1S2,  no murmur, gallop or rub.   Lungs:  Clear pacer site intact  Abdomen:   Soft, non-tender, bowel sounds are active.   Extremities:  No edema  Pulse: symmetric  Neuro: A&O times 3, No focal neuro deficits    Cardiographics    PPM check today in office--see scanned    Labs:   Lab Results   Component Value Date/Time    Sodium 143 04/04/2017 07:00 PM    Sodium 140 04/04/2017 03:36 PM    Sodium 144 06/14/2016 08:38 AM    Sodium 144 03/15/2016 11:04 AM    Sodium 141 01/13/2016 08:49 AM    Potassium 4.5 04/04/2017 07:00 PM    Potassium 4.7 04/04/2017 03:36 PM    Potassium 5.7 06/14/2016 08:38 AM    Potassium 5.5 03/15/2016 11:04 AM    Potassium 5.1 01/13/2016 08:49 AM    Chloride 105 04/04/2017 07:00 PM    Chloride 102 04/04/2017 03:36 PM    Chloride 104 06/14/2016 08:38 AM    Chloride 102 03/15/2016 11:04 AM    Chloride 99 01/13/2016 08:49 AM    CO2 29 04/04/2017 07:00 PM    CO2 22 04/04/2017 03:36 PM    CO2 25 06/14/2016 08:38 AM    CO2 21 03/15/2016 11:04 AM    CO2 24 01/13/2016 08:49 AM    Anion gap 9 04/04/2017 07:00 PM    Glucose 285 04/04/2017 07:00 PM    Glucose 373 04/04/2017 03:36 PM    Glucose 123 06/14/2016 08:38 AM    Glucose 183 03/15/2016 11:04 AM    Glucose 261 01/13/2016 08:49 AM    BUN 25 04/04/2017 07:00 PM    BUN 24 04/04/2017 03:36 PM    BUN 12 06/14/2016 08:38 AM    BUN 15 03/15/2016 11:04 AM    BUN 14 01/13/2016 08:49 AM    Creatinine 0.93 04/04/2017 07:00 PM    Creatinine 0.83 04/04/2017 03:36 PM    Creatinine 0.74 06/14/2016 08:38 AM    Creatinine 0.75 03/15/2016 11:04 AM    Creatinine 0.71 01/13/2016 08:49 AM    BUN/Creatinine ratio 27 04/04/2017 07:00 PM    BUN/Creatinine ratio 29 04/04/2017 03:36 PM    BUN/Creatinine ratio 16 06/14/2016 08:38 AM    BUN/Creatinine ratio 20 03/15/2016 11:04 AM    BUN/Creatinine ratio 20 01/13/2016 08:49 AM    GFR est AA >60 04/04/2017 07:00 PM    GFR est AA 83 04/04/2017 03:36 PM    GFR est AA 96 06/14/2016 08:38 AM    GFR est AA 95 03/15/2016 11:04 AM    GFR est  01/13/2016 08:49 AM    GFR est non-AA 60 04/04/2017 07:00 PM    GFR est non-AA 72 04/04/2017 03:36 PM    GFR est non-AA 84 06/14/2016 08:38 AM    GFR est non-AA 82 03/15/2016 11:04 AM    GFR est non-AA 88 01/13/2016 08:49 AM    Calcium 9.4 04/04/2017 07:00 PM    Calcium 9.6 04/04/2017 03:36 PM    Calcium 10.0 06/14/2016 08:38 AM    Calcium 10.3 03/15/2016 11:04 AM    Calcium 9.8 01/13/2016 08:49 AM    Bilirubin, total 0.2 04/04/2017 07:00 PM    Bilirubin, total <0.2 04/04/2017 03:36 PM    Bilirubin, total 0.3 06/14/2016 08:38 AM    Bilirubin, total 0.2 06/29/2015 07:46 AM    Bilirubin, total 0.3 06/07/2014 10:28 AM    AST (SGOT) 11 04/04/2017 07:00 PM    AST (SGOT) 8 04/04/2017 03:36 PM    AST (SGOT) 13 06/14/2016 08:38 AM    AST (SGOT) 15 06/29/2015 07:46 AM    AST (SGOT) 11 06/07/2014 10:28 AM    Alk. phosphatase 87 04/04/2017 07:00 PM    Alk. phosphatase 90 04/04/2017 03:36 PM    Alk. phosphatase 65 06/14/2016 08:38 AM    Alk. phosphatase 87 06/29/2015 07:46 AM    Alk.  phosphatase 63 06/07/2014 10:28 AM    Protein, total 6.7 04/04/2017 07:00 PM    Protein, total 6.4 04/04/2017 03:36 PM    Protein, total 6.8 06/14/2016 08:38 AM Protein, total 6.5 06/29/2015 07:46 AM    Protein, total 6.3 06/07/2014 10:28 AM    Albumin 3.3 04/04/2017 07:00 PM    Albumin 3.6 04/04/2017 03:36 PM    Albumin 4.4 06/14/2016 08:38 AM    Albumin 4.3 06/29/2015 07:46 AM    Albumin 4.1 06/07/2014 10:28 AM    Globulin 3.4 04/04/2017 07:00 PM    A-G Ratio 1.0 04/04/2017 07:00 PM    A-G Ratio 1.3 04/04/2017 03:36 PM    A-G Ratio 1.8 06/14/2016 08:38 AM    A-G Ratio 2.0 06/29/2015 07:46 AM    A-G Ratio 1.9 06/07/2014 10:28 AM    ALT (SGPT) 15 04/04/2017 07:00 PM    ALT (SGPT) 9 04/04/2017 03:36 PM    ALT (SGPT) 12 06/14/2016 08:38 AM    ALT (SGPT) 12 06/29/2015 07:46 AM    ALT (SGPT) 11 06/07/2014 10:28 AM     Lab Results   Component Value Date/Time    CK 36 04/04/2017 07:00 PM     Lab Results   Component Value Date/Time    Cholesterol, total 144 06/14/2016 08:38 AM    Cholesterol, total 170 06/29/2015 07:46 AM    HDL Cholesterol 42 06/14/2016 08:38 AM    HDL Cholesterol 43 06/29/2015 07:46 AM    LDL, calculated 79 06/14/2016 08:38 AM    LDL, calculated 102 06/29/2015 07:46 AM    Triglyceride 115 06/14/2016 08:38 AM    Triglyceride 127 06/29/2015 07:46 AM     No results found for this or any previous visit. Assessment:         Patient Active Problem List    Diagnosis Date Noted    S/P cardiac pacemaker procedure 04/06/2017    Second degree AV block 04/05/2017    Tobacco abuse 04/05/2017    Diplopia 04/05/2017    Benign essential HTN 06/14/2016    Vitamin D deficiency 06/14/2016    Fe deficiency anemia 06/14/2016    Proteinuria     Diabetes ([address]       No CV sx or complaints. Seeing PCP, taking meds. diploplia had resolved. No pacemaker issues. Still smoking. Plan:     Doing well with no adverse cardiac symptoms. Lipids and labs followed by PCP. Continue current care and f/u in 6 months.     Lilian Neal MD

## 2017-08-14 DIAGNOSIS — E11.21 TYPE 2 DIABETES MELLITUS WITH DIABETIC NEPHROPATHY, WITHOUT LONG-TERM CURRENT USE OF INSULIN (HCC): ICD-10-CM

## 2017-08-14 RX ORDER — GLIPIZIDE 10 MG/1
TABLET ORAL
Qty: 180 TAB | Refills: 0 | Status: SHIPPED | OUTPATIENT
Start: 2017-08-14 | End: 2017-10-16 | Stop reason: SDUPTHER

## 2017-08-14 RX ORDER — SITAGLIPTIN 100 MG/1
TABLET, FILM COATED ORAL
Qty: 90 TAB | Refills: 0 | Status: SHIPPED | OUTPATIENT
Start: 2017-08-14 | End: 2017-10-16 | Stop reason: SDUPTHER

## 2017-08-17 ENCOUNTER — OFFICE VISIT (OUTPATIENT)
Dept: FAMILY MEDICINE CLINIC | Age: 72
End: 2017-08-17

## 2017-08-17 VITALS
RESPIRATION RATE: 20 BRPM | WEIGHT: 162 LBS | HEIGHT: 63 IN | HEART RATE: 84 BPM | SYSTOLIC BLOOD PRESSURE: 155 MMHG | DIASTOLIC BLOOD PRESSURE: 81 MMHG | OXYGEN SATURATION: 96 % | BODY MASS INDEX: 28.7 KG/M2 | TEMPERATURE: 96.4 F

## 2017-08-17 DIAGNOSIS — Z13.220 NEED FOR LIPID SCREENING: ICD-10-CM

## 2017-08-17 DIAGNOSIS — E11.21 TYPE 2 DIABETES MELLITUS WITH DIABETIC NEPHROPATHY, WITHOUT LONG-TERM CURRENT USE OF INSULIN (HCC): Primary | ICD-10-CM

## 2017-08-17 DIAGNOSIS — Z95.0 S/P CARDIAC PACEMAKER PROCEDURE: ICD-10-CM

## 2017-08-17 DIAGNOSIS — R94.6 ABNORMAL THYROID FUNCTION TEST: ICD-10-CM

## 2017-08-17 DIAGNOSIS — T75.3XXA TRAVEL SICKNESS, INITIAL ENCOUNTER: ICD-10-CM

## 2017-08-17 DIAGNOSIS — I10 BENIGN ESSENTIAL HTN: ICD-10-CM

## 2017-08-17 DIAGNOSIS — T75.3XXA SEA SICKNESS, INITIAL ENCOUNTER: ICD-10-CM

## 2017-08-17 RX ORDER — SCOLOPAMINE TRANSDERMAL SYSTEM 1 MG/1
1.5 PATCH, EXTENDED RELEASE TRANSDERMAL
Qty: 4 PATCH | Refills: 1 | Status: SHIPPED | OUTPATIENT
Start: 2017-08-17 | End: 2018-03-20 | Stop reason: ALTCHOICE

## 2017-08-17 NOTE — PATIENT INSTRUCTIONS
Diarrhea: Care Instructions  Your Care Instructions    Diarrhea is loose, watery stools (bowel movements). The exact cause is often hard to find. Sometimes diarrhea is your body's way of getting rid of what caused an upset stomach. Viruses, food poisoning, and many medicines can cause diarrhea. Some people get diarrhea in response to emotional stress, anxiety, or certain foods. Almost everyone has diarrhea now and then. It usually isn't serious, and your stools will return to normal soon. The important thing to do is replace the fluids you have lost, so you can prevent dehydration. The doctor has checked you carefully, but problems can develop later. If you notice any problems or new symptoms, get medical treatment right away. Follow-up care is a key part of your treatment and safety. Be sure to make and go to all appointments, and call your doctor if you are having problems. It's also a good idea to know your test results and keep a list of the medicines you take. How can you care for yourself at home? · Watch for signs of dehydration, which means your body has lost too much water. Dehydration is a serious condition and should be treated right away. Signs of dehydration are:  ¨ Increasing thirst and dry eyes and mouth. ¨ Feeling faint or lightheaded. ¨ Darker urine, and a smaller amount of urine than normal.  · To prevent dehydration, drink plenty of fluids, enough so that your urine is light yellow or clear like water. Choose water and other caffeine-free clear liquids until you feel better. If you have kidney, heart, or liver disease and have to limit fluids, talk with your doctor before you increase the amount of fluids you drink. · Begin eating small amounts of mild foods the next day, if you feel like it. ¨ Try yogurt that has live cultures of Lactobacillus. (Check the label.)  ¨ Avoid spicy foods, fruits, alcohol, and caffeine until 48 hours after all symptoms are gone.   ¨ Avoid chewing gum that contains sorbitol. ¨ Avoid dairy products (except for yogurt with Lactobacillus) while you have diarrhea and for 3 days after symptoms are gone. · The doctor may recommend that you take over-the-counter medicine, such as loperamide (Imodium), if you still have diarrhea after 6 hours. Read and follow all instructions on the label. Do not use this medicine if you have bloody diarrhea, a high fever, or other signs of serious illness. Call your doctor if you think you are having a problem with your medicine. When should you call for help? Call 911 anytime you think you may need emergency care. For example, call if:  · You passed out (lost consciousness). · Your stools are maroon or very bloody. Call your doctor now or seek immediate medical care if:  · You are dizzy or lightheaded, or you feel like you may faint. · Your stools are black and look like tar, or they have streaks of blood. · You have new or worse belly pain. · You have symptoms of dehydration, such as:  ¨ Dry eyes and a dry mouth. ¨ Passing only a little dark urine. ¨ Feeling thirstier than usual.  · You have a new or higher fever. Watch closely for changes in your health, and be sure to contact your doctor if:  · Your diarrhea is getting worse. · You see pus in the diarrhea. · You are not getting better after 2 days (48 hours). Where can you learn more? Go to http://kamaljit-quan.info/. Enter W599 in the search box to learn more about \"Diarrhea: Care Instructions. \"  Current as of: March 20, 2017  Content Version: 11.3  © 9255-4478 [employer]. Care instructions adapted under license by [employer] (which disclaims liability or warranty for this information). If you have questions about a medical condition or this instruction, always ask your healthcare professional. [address] any warranty or liability for your use of this information.

## 2017-08-17 NOTE — PROGRESS NOTES
Wil Shea is a 81 y.o. female who presents to the office today with the following:  Chief Complaint   Patient presents with    Follow-up     on DM , refills (would like motion sickness patch for cruise       HPI  [id number]  Doing well on current regimen. Refuses insulin. Reports BS in AM 180s sometimes, but normally in 130s. Evening readings 120s. Lab Results   Component Value Date/Time    Hemoglobin A1c 12.6 04/04/2017 03:36 PM   Has diarrhea twice daily, assoc with medication. Wants to know what she can take otc. Has tried Imodium w/o much relief. Does not want to get off the medication, feels she is otherwise doing well with it. Reports no hypoglycemic episodes. Saw Dr. Joel Colón recently, doing well s/p pacemaker. BP has been good \"except when here,\" at home mostly 120s-130/70s. No recent HAs, CP, SOB, or TIA sxs. No  or GI complaints. Health Maintenance Due   Topic Date Due    GLAUCOMA SCREENING Q2Y  06/20/2012 Dr. Wilmer Mcclain. Has had annual.    MICROALBUMIN Q1  06/14/2017 Had done recently at [facility].  LIPID PANEL Q1  06/14/2017 today. Says never having mammogram again, was told since has pacemaker should not get. Had apt scheduled with Ophthalmology. Diplopia has fully resolved. So never went to apt. Is not fasting today but would like to proceed with labs. Otherwise feeling well with no other complaints or acute concerns. Review of Systems   Constitutional: Negative for fever, malaise/fatigue and weight loss. Eyes: Negative. Respiratory: Negative for shortness of breath. Cardiovascular: Negative for chest pain, palpitations and leg swelling. Gastrointestinal: Negative. Genitourinary: Negative. Musculoskeletal: Negative for myalgias. Skin: Negative for rash. Neurological: Negative. See HPI.     Allergies   Allergen Reactions    Codeine Hives and Shortness of Breath     Throat closes shut    Flowers Anaphylaxis     roses    Keflex [Cephalexin] Hives and Shortness of Breath     Throat closes shut    Pcn [Penicillins] Hives and Shortness of Breath     Throat closes shut    Metformin Diarrhea    Ciprofloxacin Shortness of Breath    Morphine Angioedema     Throat swells    Adhesive Tape-Silicones Rash       Current Outpatient Prescriptions   Medication Sig    scopolamine (TRANSDERM-SCOP) 1.5 mg (1 mg over 3 days) pt3d 1 Patch by TransDERmal route every seventy-two (72) hours.  JANUVIA 100 mg tablet TAKE 1 TABLET EVERY DAY    glipiZIDE (GLUCOTROL) 10 mg tablet TAKE 1 TABLET TWICE DAILY    losartan (COZAAR) 50 mg tablet Take 1 Tab by mouth daily.  clotrimazole (LOTRIMIN) 1 % topical cream Apply  to affected area two (2) times a day. PRN    TRUE METRIX AIR GLUCOSE METER monitoring kit USE AS DIRECTED    glucose blood VI test strips (ASCENSIA AUTODISC VI, ONE TOUCH ULTRA TEST VI) strip TID testing    Lancets misc TID testing.  dulaglutide (TRULICITY) 1.02 MA/9.0 mL sub-q pen 0.5 mL by SubCUTAneous route every seven (7) days.  vit N-V-W2-E-omega-3-ala-dha 250-3-50 unit,mg,unit chew Take 1 Tab by mouth daily.  cholecalciferol (VITAMIN D3) 1,000 unit cap Take 1,000 Units by mouth daily.  ferrous sulfate 325 mg (65 mg iron) tablet Take  by mouth Daily (before breakfast).  vitamin E (AQUA GEMS) 400 unit capsule Take 400 Units by mouth daily.  ascorbic acid (VITAMIN C) 500 mg tablet Take 500 mg by mouth daily.  multivitamin (ONE A DAY) tablet Take 1 Tab by mouth daily.  cyanocobalamin (VITAMIN B-12) 1,000 mcg tablet Take 1,000 mcg by mouth daily. No current facility-administered medications for this visit.         Past Medical History:   Diagnosis Date    Diabetes ([facility] [address]     Diplopia 4/5/2017    Mobitz (type) II atrioventricular block 4/5/2017    Proteinuria     S/P cardiac pacemaker procedure 4/6/2017 4/6/17 Medtronic dual chamber pacemaker implant    Tobacco abuse 4/5/2017       Past Surgical History:   Procedure Laterality Date    HX APPENDECTOMY      HX CHOLECYSTECTOMY      HX COLONOSCOPY  2014    nl, q 2-3 y    HX GYN      hysterectomy    HX ORTHOPAEDIC  1979    back    HX PACEMAKER  04/06/2017    pacemaker placed on the left side       Social History     Social History    Marital status: SINGLE     Spouse name: N/A    Number of children: N/A    Years of education: N/A     Social History Main Topics    Smoking status: Current Every Day Smoker     Packs/day: 1.00     Types: Cigarettes    Smokeless tobacco: Never Used    Alcohol use Yes      Comment: rare    Drug use: No    Sexual activity: Not Currently     Partners: Male     Other Topics Concern    None     Social History Narrative       Family History   Problem Relation Age of Onset    COPD Mother     Asthma Mother     Heart Disease Father     Cancer Maternal Grandmother     Stroke Maternal Grandmother     Cancer Maternal Grandfather     Cancer Paternal Grandmother     Heart Disease Paternal Grandfather          Physical Exam:  Visit Vitals    /81 (BP 1 Location: Right arm, BP Patient Position: Sitting)    Pulse 84    Temp 96.4 °F (35.8 °C) (Temporal)    Resp 20    Ht 5' 3\" (1.6 m)    Wt 162 lb (73.5 kg)    SpO2 96%    BMI 28.7 kg/m2     Physical Exam   Constitutional: She is oriented to person, place, and time and well-developed, well-nourished, and in no distress. HENT:   Head: Normocephalic and atraumatic. Right Ear: External ear normal.   Left Ear: External ear normal.   Nose: Nose normal.   Mouth/Throat: Oropharynx is clear and moist.   Eyes: Conjunctivae and EOM are normal.   Neck: Normal range of motion. Neck supple. Cardiovascular: Normal rate, regular rhythm, normal heart sounds and intact distal pulses. pacemaker   Pulmonary/Chest: Effort normal and breath sounds normal.   Abdominal: Soft. There is no tenderness. Musculoskeletal: Normal range of motion. She exhibits no edema.    Lymphadenopathy:     She has no cervical adenopathy. Neurological: She is alert and oriented to person, place, and time. She has normal motor skills, normal sensation, normal strength and normal reflexes. No cranial nerve deficit. Gait normal.   Skin: Skin is warm and dry. Psychiatric: Mood and affect normal.   Nursing note and vitals reviewed. Assessment/Plan:    ICD-10-CM ICD-9-CM    1. Type 2 diabetes mellitus with diabetic nephropathy, without long-term current use of insulin (HCC) E11.21 250.40 CBC WITH AUTOMATED DIFF     039.84 METABOLIC PANEL, COMPREHENSIVE      HEMOGLOBIN A1C WITH EAG      LIPID PANEL   2. Benign essential HTN I10 401.1 LIPID PANEL   3. S/P cardiac pacemaker procedure Z95.0 V45.01    4. Sea sickness, initial encounter T75. 3XXA 994.6 scopolamine (TRANSDERM-SCOP) 1.5 mg (1 mg over 3 days) pt3d   5. Travel sickness, initial encounter T75. 3XXA 994.6 scopolamine (TRANSDERM-SCOP) 1.5 mg (1 mg over 3 days) pt3d   6. Need for lipid screening Z13.220 V77.91 LIPID PANEL   7. Abnormal thyroid function test R94.6 794.5 TSH 3RD GENERATION     Continue current regimen and regular BS/BP monitoring. Report any SEs or new sxs. Seek immediate care if ever any red flag sxs. Reiterated LMs. Refuses urine today, says just had \"tested by two doctors\" once at [facility], unsure what it was tested for or the providers name she saw or why. Would like all 90 day refills in the future. Doing okay right now. Follow-up Disposition:  Return in about 3 months (around 11/17/2017), or sooner prn.     Aj Schmitt PA-C

## 2017-08-17 NOTE — PROGRESS NOTES
Patient \"sick of all doctor appts - refuses Wellness\"    Visit Vitals    /81 (BP 1 Location: Right arm, BP Patient Position: Sitting)    Pulse 84    Temp 96.4 °F (35.8 °C) (Temporal)    Resp 20    Ht 5' 3\" (1.6 m)    Wt 162 lb (73.5 kg)    SpO2 96%    BMI 28.7 kg/m2       Chief Complaint   Patient presents with    Follow-up     on DM , refills (would like motion sickness patch for cruise       Patient adamantly refused any discussion of medical wellness exam.    John Hadley LPN

## 2017-08-17 NOTE — MR AVS SNAPSHOT
Visit Information Date & Time Provider Department Dept. Phone Encounter #  
 8/17/2017 10:00 AM Hloa Thomas PA-C [address] [id number] Follow-up Instructions Return in about 3 months (around 11/17/2017), or sooner prn. Follow-up and Disposition History Your Appointments 1/26/2018 11:40 AM  
ESTABLISHED PATIENT with John Thomas MD  
[facility] [place] [facility] [address]) Appt Note: 6 mo [employer] PACEMAKER CHECK (Needs appt with florinda next and be set up on CL) [address] [facility] [id number] [phone] [address] [address] Upcoming Health Maintenance Date Due  
 GLAUCOMA SCREENING Q2Y 6/20/2012 MICROALBUMIN Q1 6/14/2017 LIPID PANEL Q1 6/14/2017 HEMOGLOBIN A1C Q6M 10/4/2017 Pneumococcal 65+ Low/Medium Risk (2 of 2 - PPSV23) 1/4/2018 FOOT EXAM Q1 5/10/2018 MEDICARE YEARLY EXAM 8/18/2018 BREAST CANCER SCRN MAMMOGRAM 1/4/2019 DTaP/Tdap/Td series (2 - Td) 1/4/2027 COLONOSCOPY 1/4/2027 Allergies as of 8/17/2017  Review Complete On: 8/17/2017 By: Hola Thomas PA-C Severity Noted Reaction Type Reactions Codeine High 06/07/2014    Hives, Shortness of Breath Throat closes shut Flowers High 04/05/2017    Anaphylaxis  
 roses Keflex [Cephalexin] High 06/07/2014    Hives, Shortness of Breath Throat closes shut Pcn [Penicillins] High 06/07/2014    Hives, Shortness of Breath Throat closes shut Metformin Medium 05/05/2017    Diarrhea Ciprofloxacin  06/18/2016    Shortness of Breath Morphine  01/04/2017    Angioedema Throat swells Adhesive Tape-silicones Low 21/87/5864    Rash Current Immunizations  Never Reviewed No immunizations on file. Not reviewed this visit You Were Diagnosed With   
  
 Codes Comments Type 2 diabetes mellitus with diabetic nephropathy, without long-term current use of insulin (HCC)    -  Primary ICD-10-CM: E11.21 
ICD-9-CM: 250.40, 583.81 Benign essential HTN     ICD-10-CM: I10 
ICD-9-CM: 401.1 S/P cardiac pacemaker procedure     ICD-10-CM: Z95.0 ICD-9-CM: V45.01 Sea sickness, initial encounter     ICD-10-CM: T75. 3XXA ICD-9-CM: 994.6 Travel sickness, initial encounter     ICD-10-CM: T75. 3XXA ICD-9-CM: 994.6 Need for lipid screening     ICD-10-CM: Z13.220 ICD-9-CM: V77.91 Abnormal thyroid function test     ICD-10-CM: R94.6 ICD-9-CM: 794.5 Vitals BP Pulse Temp Resp Height(growth percentile) 155/81 (BP 1 Location: Right arm, BP Patient Position: Sitting) 84 96.4 °F (35.8 °C) (Temporal) 20 5' 3\" (1.6 m) Weight(growth percentile) SpO2 BMI OB Status Smoking Status 162 lb (73.5 kg) 96% 28.7 kg/m2 Hysterectomy Current Every Day Smoker BMI and BSA Data Body Mass Index Body Surface Area 28.7 kg/m 2 1.81 m 2 Preferred Pharmacy Pharmacy Name Phone [facility] [address] [place] [address] Your Updated Medication List  
  
   
This list is accurate as of: 8/17/17 11:01 AM.  Always use your most recent med list.  
  
  
  
  
 ascorbic acid (vitamin C) 500 mg tablet Commonly known as:  VITAMIN C Take 500 mg by mouth daily. cholecalciferol 1,000 unit Cap Commonly known as:  VITAMIN D3 Take 1,000 Units by mouth daily. clotrimazole 1 % topical cream  
Commonly known as:  Boris Bonine Apply  to affected area two (2) times a day. PRN  
  
 dulaglutide 0.75 mg/0.5 mL sub-q pen Commonly known as:  TRULICITY  
0.5 mL by SubCUTAneous route every seven (7) days. ferrous sulfate 325 mg (65 mg iron) tablet Take  by mouth Daily (before breakfast). glipiZIDE 10 mg tablet Commonly known as:  GLUCOTROL  
TAKE 1 TABLET TWICE DAILY  
  
 glucose blood VI test strips strip Commonly known as:  ASCENSIA AUTODISC VI, ONE TOUCH ULTRA TEST VI  
TID testing JANUVIA 100 mg tablet Generic drug:  SITagliptin TAKE 1 TABLET EVERY DAY Lancets Misc TID testing.  
  
 losartan 50 mg tablet Commonly known as:  COZAAR Take 1 Tab by mouth daily. multivitamin tablet Commonly known as:  ONE A DAY Take 1 Tab by mouth daily. scopolamine 1.5 mg (1 mg over 3 days) Pt3d Commonly known as:  TRANSDERM-SCOP  
1 Patch by TransDERmal route every seventy-two (72) hours. TRUE METRIX AIR GLUCOSE METER monitoring kit Generic drug:  Blood-Glucose Meter USE AS DIRECTED  
  
 vit W-C-E3-E-omega-3-ala-dha 250-3-50 unit,mg,unit Chew Take 1 Tab by mouth daily. VITAMIN B-12 1,000 mcg tablet Generic drug:  cyanocobalamin Take 1,000 mcg by mouth daily. vitamin E 400 unit capsule Commonly known as:  [address] Take 400 Units by mouth daily. Prescriptions Sent to Pharmacy Refills  
 scopolamine (TRANSDERM-SCOP) 1.5 mg (1 mg over 3 days) pt3d 1 Si Patch by TransDERmal route every seventy-two (72) hours. Class: Normal  
 Pharmacy: [facility] [address] Ph #: [phone] Route: TransDERmal  
  
We Performed the Following CBC WITH AUTOMATED DIFF [32401 CPT(R)] HEMOGLOBIN A1C WITH EAG [91166 CPT(R)] LIPID PANEL [41613 CPT(R)] METABOLIC PANEL, COMPREHENSIVE [90945 CPT(R)] TSH 3RD GENERATION [45464 CPT(R)] Follow-up Instructions Return in about 3 months (around 2017), or sooner prn. Patient Instructions Diarrhea: Care Instructions Your Care Instructions Diarrhea is loose, watery stools (bowel movements). The exact cause is often hard to find. Sometimes diarrhea is your body's way of getting rid of what caused an upset stomach. Viruses, food poisoning, and many medicines can cause diarrhea.  Some people get diarrhea in response to emotional stress, anxiety, or certain foods. Almost everyone has diarrhea now and then. It usually isn't serious, and your stools will return to normal soon. The important thing to do is replace the fluids you have lost, so you can prevent dehydration. The doctor has checked you carefully, but problems can develop later. If you notice any problems or new symptoms, get medical treatment right away. Follow-up care is a key part of your treatment and safety. Be sure to make and go to all appointments, and call your doctor if you are having problems. It's also a good idea to know your test results and keep a list of the medicines you take. How can you care for yourself at home? · Watch for signs of dehydration, which means your body has lost too much water. Dehydration is a serious condition and should be treated right away. Signs of dehydration are: 
¨ Increasing thirst and dry eyes and mouth. ¨ Feeling faint or lightheaded. ¨ Darker urine, and a smaller amount of urine than normal. 
· To prevent dehydration, drink plenty of fluids, enough so that your urine is light yellow or clear like water. Choose water and other caffeine-free clear liquids until you feel better. If you have kidney, heart, or liver disease and have to limit fluids, talk with your doctor before you increase the amount of fluids you drink. · Begin eating small amounts of mild foods the next day, if you feel like it. ¨ Try yogurt that has live cultures of Lactobacillus. (Check the label.) ¨ Avoid spicy foods, fruits, alcohol, and caffeine until 48 hours after all symptoms are gone. ¨ Avoid chewing gum that contains sorbitol. ¨ Avoid dairy products (except for yogurt with Lactobacillus) while you have diarrhea and for 3 days after symptoms are gone. · The doctor may recommend that you take over-the-counter medicine, such as loperamide (Imodium), if you still have diarrhea after 6 hours.  Read and follow all instructions on the label. Do not use this medicine if you have bloody diarrhea, a high fever, or other signs of serious illness. Call your doctor if you think you are having a problem with your medicine. When should you call for help? Call 911 anytime you think you may need emergency care. For example, call if: 
· You passed out (lost consciousness). · Your stools are maroon or very bloody. Call your doctor now or seek immediate medical care if: 
· You are dizzy or lightheaded, or you feel like you may faint. · Your stools are black and look like tar, or they have streaks of blood. · You have new or worse belly pain. · You have symptoms of dehydration, such as: ¨ Dry eyes and a dry mouth. ¨ Passing only a little dark urine. ¨ Feeling thirstier than usual. 
· You have a new or higher fever. Watch closely for changes in your health, and be sure to contact your doctor if: 
· Your diarrhea is getting worse. · You see pus in the diarrhea. · You are not getting better after 2 days (48 hours). Where can you learn more? Go to http://kamaljit-quan.info/. Enter G909 in the search box to learn more about \"Diarrhea: Care Instructions. \" Current as of: March 20, 2017 Content Version: 11.3 © 9097-0756 [employer]. Care instructions adapted under license by [employer] (which disclaims liability or warranty for this information). If you have questions about a medical condition or this instruction, always ask your healthcare professional. [place] [address] any warranty or liability for your use of this information. Introducing [facility]! Brielle Nuñez introduces [employer] patient portal. Now you can access parts of your medical record, email your doctor's office, and request medication refills online. 1. In your internet browser, go to [email or web address] 2. Click on the First Time User? Click Here link in the Sign In box. You will see the New Member Sign Up page. 3. Enter your [employer] Access Code exactly as it appears below. You will not need to use this code after youve completed the sign-up process. If you do not sign up before the expiration date, you must request a new code. · [employer] Access Code: [email or web address] Expires: 10/3/2017  9:51 AM 
 
4. Enter the last four digits of your Social Security Number (xxxx) and Date of Birth (mm/dd/yyyy) as indicated and click Submit. You will be taken to the next sign-up page. 5. Create a [employer] ID. This will be your [employer] login ID and cannot be changed, so think of one that is secure and easy to remember. 6. Create a [employer] password. You can change your password at any time. 7. Enter your Password Reset Question and Answer. This can be used at a later time if you forget your password. 8. Enter your e-mail address. You will receive e-mail notification when new information is available in [address] 9. Click Sign Up. You can now view and download portions of your medical record. 10. Click the Download Summary menu link to download a portable copy of your medical information. If you have questions, please visit the Frequently Asked Questions section of the [employer] website. Remember, [employer] is NOT to be used for urgent needs. For medical emergencies, dial 911. Now available from your iPhone and Android! Please provide this summary of care documentation to your next provider. Your primary care clinician is listed as Romeo Post. If you have any questions after today's visit, please call [phone].

## 2017-08-18 LAB
ALBUMIN SERPL-MCNC: 3.9 G/DL (ref 3.5–4.8)
ALBUMIN/GLOB SERPL: 1.5 {RATIO} (ref 1.2–2.2)
ALP SERPL-CCNC: 65 IU/L (ref 39–117)
ALT SERPL-CCNC: 14 IU/L (ref 0–32)
AST SERPL-CCNC: 14 IU/L (ref 0–40)
BASOPHILS # BLD AUTO: 0 X10E3/UL (ref 0–0.2)
BASOPHILS NFR BLD AUTO: 0 %
BILIRUB SERPL-MCNC: 0.3 MG/DL (ref 0–1.2)
BUN SERPL-MCNC: 20 MG/DL (ref 8–27)
BUN/CREAT SERPL: 22 (ref 12–28)
CALCIUM SERPL-MCNC: 10 MG/DL (ref 8.7–10.3)
CHLORIDE SERPL-SCNC: 105 MMOL/L (ref 96–106)
CHOLEST SERPL-MCNC: 156 MG/DL (ref 100–199)
CO2 SERPL-SCNC: 24 MMOL/L (ref 18–29)
CREAT SERPL-MCNC: 0.9 MG/DL (ref 0.57–1)
EOSINOPHIL # BLD AUTO: 0.2 X10E3/UL (ref 0–0.4)
EOSINOPHIL NFR BLD AUTO: 2 %
ERYTHROCYTE [DISTWIDTH] IN BLOOD BY AUTOMATED COUNT: 14.2 % (ref 12.3–15.4)
EST. AVERAGE GLUCOSE BLD GHB EST-MCNC: 143 MG/DL
GLOBULIN SER CALC-MCNC: 2.6 G/DL (ref 1.5–4.5)
GLUCOSE SERPL-MCNC: 78 MG/DL (ref 65–99)
HBA1C MFR BLD: 6.6 % (ref 4.8–5.6)
HCT VFR BLD AUTO: 35.3 % (ref 34–46.6)
HDLC SERPL-MCNC: 62 MG/DL
HGB BLD-MCNC: 11.5 G/DL (ref 11.1–15.9)
IMM GRANULOCYTES # BLD: 0 X10E3/UL (ref 0–0.1)
IMM GRANULOCYTES NFR BLD: 1 %
INTERPRETATION, 910389: NORMAL
LDLC SERPL CALC-MCNC: 80 MG/DL (ref 0–99)
LYMPHOCYTES # BLD AUTO: 2.7 X10E3/UL (ref 0.7–3.1)
LYMPHOCYTES NFR BLD AUTO: 35 %
MCH RBC QN AUTO: 28.8 PG (ref 26.6–33)
MCHC RBC AUTO-ENTMCNC: 32.6 G/DL (ref 31.5–35.7)
MCV RBC AUTO: 88 FL (ref 79–97)
MONOCYTES # BLD AUTO: 0.6 X10E3/UL (ref 0.1–0.9)
MONOCYTES NFR BLD AUTO: 7 %
NEUTROPHILS # BLD AUTO: 4.3 X10E3/UL (ref 1.4–7)
NEUTROPHILS NFR BLD AUTO: 55 %
PLATELET # BLD AUTO: 223 X10E3/UL (ref 150–379)
POTASSIUM SERPL-SCNC: 4.8 MMOL/L (ref 3.5–5.2)
PROT SERPL-MCNC: 6.5 G/DL (ref 6–8.5)
RBC # BLD AUTO: 4 X10E6/UL (ref 3.77–5.28)
SODIUM SERPL-SCNC: 142 MMOL/L (ref 134–144)
TRIGL SERPL-MCNC: 72 MG/DL (ref 0–149)
TSH SERPL DL<=0.005 MIU/L-ACNC: 2.69 UIU/ML (ref 0.45–4.5)
VLDLC SERPL CALC-MCNC: 14 MG/DL (ref 5–40)
WBC # BLD AUTO: 7.8 X10E3/UL (ref 3.4–10.8)

## 2017-08-18 NOTE — PROGRESS NOTES
Notify pt all labs came back wnl and A1c has significantly improved! Now is <7 which shows good glycemic control. Keep up the good work with diet/staying active and will recheck in 3 mo.

## 2017-09-21 DIAGNOSIS — E11.21 TYPE 2 DIABETES MELLITUS WITH DIABETIC NEPHROPATHY, WITHOUT LONG-TERM CURRENT USE OF INSULIN (HCC): ICD-10-CM

## 2017-09-21 RX ORDER — BLOOD-GLUCOSE METER
EACH MISCELLANEOUS
Qty: 1 KIT | Refills: 0 | Status: SHIPPED | OUTPATIENT
Start: 2017-09-21 | End: 2017-11-27 | Stop reason: SDUPTHER

## 2017-10-03 DIAGNOSIS — E11.65 POORLY CONTROLLED DIABETES MELLITUS (HCC): ICD-10-CM

## 2017-10-03 RX ORDER — DULAGLUTIDE 0.75 MG/.5ML
INJECTION, SOLUTION SUBCUTANEOUS
Qty: 2 ML | Refills: 2 | Status: SHIPPED | OUTPATIENT
Start: 2017-10-03 | End: 2017-11-17 | Stop reason: SDUPTHER

## 2017-10-16 DIAGNOSIS — E11.21 TYPE 2 DIABETES MELLITUS WITH DIABETIC NEPHROPATHY, WITHOUT LONG-TERM CURRENT USE OF INSULIN (HCC): ICD-10-CM

## 2017-10-17 RX ORDER — SITAGLIPTIN 100 MG/1
TABLET, FILM COATED ORAL
Qty: 90 TAB | Refills: 0 | Status: SHIPPED | OUTPATIENT
Start: 2017-10-17 | End: 2017-11-17 | Stop reason: SDUPTHER

## 2017-10-17 RX ORDER — GLIPIZIDE 10 MG/1
TABLET ORAL
Qty: 180 TAB | Refills: 0 | Status: SHIPPED | OUTPATIENT
Start: 2017-10-17 | End: 2017-11-17 | Stop reason: SDUPTHER

## 2017-11-17 ENCOUNTER — OFFICE VISIT (OUTPATIENT)
Dept: FAMILY MEDICINE CLINIC | Age: 72
End: 2017-11-17

## 2017-11-17 VITALS
BODY MASS INDEX: 29.06 KG/M2 | SYSTOLIC BLOOD PRESSURE: 152 MMHG | WEIGHT: 164 LBS | HEART RATE: 87 BPM | TEMPERATURE: 97.8 F | RESPIRATION RATE: 20 BRPM | OXYGEN SATURATION: 97 % | DIASTOLIC BLOOD PRESSURE: 77 MMHG | HEIGHT: 63 IN

## 2017-11-17 DIAGNOSIS — I10 BENIGN ESSENTIAL HTN: ICD-10-CM

## 2017-11-17 DIAGNOSIS — E11.21 TYPE 2 DIABETES MELLITUS WITH DIABETIC NEPHROPATHY, WITHOUT LONG-TERM CURRENT USE OF INSULIN (HCC): Primary | ICD-10-CM

## 2017-11-17 DIAGNOSIS — E11.65 POORLY CONTROLLED DIABETES MELLITUS (HCC): ICD-10-CM

## 2017-11-17 RX ORDER — LOSARTAN POTASSIUM 50 MG/1
50 TABLET ORAL DAILY
Qty: 90 TAB | Refills: 1 | Status: SHIPPED | OUTPATIENT
Start: 2017-11-17 | End: 2018-03-20 | Stop reason: SDUPTHER

## 2017-11-17 RX ORDER — GLIPIZIDE 10 MG/1
TABLET ORAL
Qty: 180 TAB | Refills: 1 | Status: SHIPPED | OUTPATIENT
Start: 2017-11-17 | End: 2018-03-20 | Stop reason: SDUPTHER

## 2017-11-17 NOTE — MR AVS SNAPSHOT
Visit Information Date & Time Provider Department Dept. Phone Encounter #  
 11/17/2017  9:30 AM Garrel Gaucher, PA-C [address] [id number] Your Appointments 1/26/2018 11:40 AM  
ESTABLISHED PATIENT with Dot Velasquez MD  
[address] [facility] [address]) Appt Note: 6 mo [employer] PACEMAKER CHECK (Needs appt with florinda next and be set up on CL) [address] [id number] [phone]  
  
   
 [facility] [address] [address] Upcoming Health Maintenance Date Due  
 GLAUCOMA SCREENING Q2Y 6/20/2012 MICROALBUMIN Q1 6/14/2017 Pneumococcal 65+ Low/Medium Risk (2 of 2 - PPSV23) 1/4/2018 HEMOGLOBIN A1C Q6M 2/17/2018 FOOT EXAM Q1 5/10/2018 LIPID PANEL Q1 8/17/2018 MEDICARE YEARLY EXAM 8/18/2018 BREAST CANCER SCRN MAMMOGRAM 1/4/2019 DTaP/Tdap/Td series (2 - Td) 1/4/2027 COLONOSCOPY 1/4/2027 Allergies as of 11/17/2017  Review Complete On: 11/17/2017 By: Garrel Gaucher, PA-C Severity Noted Reaction Type Reactions Codeine High 06/07/2014    Hives, Shortness of Breath Throat closes shut Flowers High 04/05/2017    Anaphylaxis  
 roses Keflex [Cephalexin] High 06/07/2014    Hives, Shortness of Breath Throat closes shut Pcn [Penicillins] High 06/07/2014    Hives, Shortness of Breath Throat closes shut Metformin Medium 05/05/2017    Diarrhea Ciprofloxacin  06/18/2016    Shortness of Breath Morphine  01/04/2017    Angioedema Throat swells Adhesive Tape-silicones Low 22/31/7722    Rash Current Immunizations  Never Reviewed No immunizations on file. Not reviewed this visit You Were Diagnosed With   
  
 Codes Comments  Type 2 diabetes mellitus with diabetic nephropathy, without long-term current use of insulin (HCC)    -  Primary ICD-10-CM: E11.21 
ICD-9-CM: 250.40, 583.81   
 Benign essential HTN     ICD-10-CM: I10 
ICD-9-CM: 401.1 Vitals BP Pulse Temp Resp Height(growth percentile) Weight(growth percentile) 152/77 (BP 1 Location: Right arm, BP Patient Position: Sitting) 87 97.8 °F (36.6 °C) (Oral) 20 5' 3\" (1.6 m) 164 lb (74.4 kg) SpO2 BMI OB Status Smoking Status 97% 29.05 kg/m2 Hysterectomy Current Every Day Smoker Vitals History BMI and BSA Data Body Mass Index Body Surface Area 29.05 kg/m 2 1.82 m 2 Preferred Pharmacy Pharmacy Name Phone Fidelina Saleh [address] [place] [address] [facility] [address] [phone] Your Updated Medication List  
  
   
This list is accurate as of: 11/17/17  9:55 AM.  Always use your most recent med list.  
  
  
  
  
 ascorbic acid (vitamin C) 500 mg tablet Commonly known as:  VITAMIN C Take 500 mg by mouth daily. cholecalciferol 1,000 unit Cap Commonly known as:  VITAMIN D3 Take 1,000 Units by mouth daily. clotrimazole 1 % topical cream  
Commonly known as:  Chano Frames Apply  to affected area two (2) times a day. PRN  
  
 ferrous sulfate 325 mg (65 mg iron) tablet Take  by mouth Daily (before breakfast). glipiZIDE 10 mg tablet Commonly known as:  GLUCOTROL  
TAKE 1 TABLET TWICE DAILY  
  
 glucose blood VI test strips strip Commonly known as:  ASCENSIA AUTODISC VI, ONE TOUCH ULTRA TEST VI  
TID testing JANUVIA 100 mg tablet Generic drug:  SITagliptin TAKE 1 TABLET EVERY DAY Lancets Misc TID testing.  
  
 losartan 50 mg tablet Commonly known as:  COZAAR Take 1 Tab by mouth daily. multivitamin tablet Commonly known as:  ONE A DAY Take 1 Tab by mouth daily. scopolamine 1 mg over 3 days Pt3d Commonly known as:  TRANSDERM-SCOP  
1 Patch by TransDERmal route every seventy-two (72) hours. TRUE METRIX AIR GLUCOSE METER monitoring kit Generic drug:  Blood-Glucose Meter USE AS DIRECTED  
  
 TRULICITY 7.95 IF/0.0 mL sub-q pen Generic drug:  dulaglutide INJECT 1 SYRINGE EVERY 7 DAYS AS DIRECTED  
  
 vit X-W-N4-[id number]-omega-3-ala-dha 250-3-50 unit,mg,unit Chew Take 1 Tab by mouth daily. VITAMIN B-12 1,000 mcg tablet Generic drug:  cyanocobalamin Take 1,000 mcg by mouth daily. vitamin E 400 unit capsule Commonly known as:  [address] Take 400 Units by mouth daily. To-Do List   
 11/17/2017 Lab:  HEMOGLOBIN A1C WITH EAG   
  
 11/17/2017 Lab:  METABOLIC PANEL, BASIC   
  
 11/17/2017 Lab:  MICROALBUMIN, UR, RAND W/ MICROALBUMIN/CREA RATIO Patient Instructions Diabetes Foot Health: Care Instructions Your Care Instructions When you have diabetes, your feet need extra care and attention. Diabetes can damage the nerve endings and blood vessels in your feet, making you less likely to notice when your feet are injured. Diabetes also limits your body's ability to fight infection and get blood to areas that need it. If you get a minor foot injury, it could become an ulcer or a serious infection. With good foot care, you can prevent most of these problems. Caring for your feet can be quick and easy. Most of the care can be done when you are bathing or getting ready for bed. Follow-up care is a key part of your treatment and safety. Be sure to make and go to all appointments, and call your doctor if you are having problems. It's also a good idea to know your test results and keep a list of the medicines you take. How can you care for yourself at home? · Keep your blood sugar close to normal by watching what and how much you eat, monitoring blood sugar, taking medicines if prescribed, and getting regular exercise. · Do not smoke. Smoking affects blood flow and can make foot problems worse. If you need help quitting, talk to your doctor about stop-smoking programs and medicines. These can increase your chances of quitting for good. · Eat a diet that is low in fats. High fat intake can cause fat to build up in your blood vessels and decrease blood flow. · Inspect your feet daily for blisters, cuts, cracks, or sores. If you cannot see well, use a mirror or have someone help you. · Take care of your feet: 
[facility] [place] [facility] your feet every day. Use warm (not hot) water. Check the water temperature with your wrists or other part of your body, not your feet. ¨ Dry your feet well. Pat them dry. Do not rub the skin on your feet too hard. Dry well between your toes. If the skin on your feet stays moist, bacteria or a fungus can grow, which can lead to infection. ¨ Keep your skin soft. Use moisturizing skin cream to keep the skin on your feet soft and prevent calluses and cracks. But do not put the cream between your toes, and stop using any cream that causes a rash. ¨ Clean underneath your toenails carefully. Do not use a sharp object to clean underneath your toenails. Use the blunt end of a nail file or other rounded tool. ¨ Trim and file your toenails straight across to prevent ingrown toenails. Use a nail clipper, not scissors. Use an emery board to smooth the edges. · Change socks daily. Socks without seams are best, because seams often rub the feet. You can find socks for people with diabetes from specialty catalogs. · Look inside your shoes every day for things like gravel or torn linings, which could cause blisters or sores. · Buy shoes that fit well: 
¨ Look for shoes that have plenty of space around the toes. This helps prevent bunions and blisters. ¨ Try on shoes while wearing the kind of socks you will usually wear with the shoes. ¨ Avoid plastic shoes. They may rub your feet and cause blisters. Good shoes should be made of materials that are flexible and breathable, such as leather or cloth. ¨ Break in new shoes slowly by wearing them for no more than an hour a day for several days.  Take extra time to check your feet for red areas, blisters, or other problems after you wear new shoes. · Do not go barefoot. Do not wear sandals, and do not wear shoes with very thin soles. Thin soles are easy to puncture. They also do not protect your feet from hot pavement or cold weather. · Have your doctor check your feet during each visit. If you have a foot problem, see your doctor. Do not try to treat an early foot problem at home. Home remedies or treatments that you can buy without a prescription (such as corn removers) can be harmful. · Always get early treatment for foot problems. A minor irritation can lead to a major problem if not properly cared for early. When should you call for help? Call your doctor now or seek immediate medical care if: 
? · You have a foot sore, an ulcer or break in the skin that is not healing after 4 days, bleeding corns or calluses, or an ingrown toenail. ? · You have blue or black areas, which can mean bruising or blood flow problems. ? · You have peeling skin or tiny blisters between your toes or cracking or oozing of the skin. ? · You have a fever for more than 24 hours and a foot sore. ? · You have new numbness or tingling in your feet that does not go away after you move your feet or change positions. ? · You have unexplained or unusual swelling of the foot or ankle. ? Watch closely for changes in your health, and be sure to contact your doctor if: 
? · You cannot do proper foot care. Where can you learn more? Go to http://kamaljit-quan.info/. Enter A739 in the search box to learn more about \"Diabetes Foot Health: Care Instructions. \" Current as of: March 13, 2017 Content Version: 11.4 © 2285-4058 [employer]. Care instructions adapted under license by [employer] (which disclaims liability or warranty for this information).  If you have questions about a medical condition or this instruction, always ask your healthcare professional. [employer] disclaims any warranty or liability for your use of this information. Learning About Diabetes Food Guidelines Your Care Instructions Meal planning is important to manage diabetes. It helps keep your blood sugar at a target level (which you set with your doctor). You don't have to eat special foods. You can eat what your family eats, including sweets once in a while. But you do have to pay attention to how often you eat and how much you eat of certain foods. You may want to work with a dietitian or a certified diabetes educator (CDE) to help you plan meals and snacks. A dietitian or CDE can also help you lose weight if that is one of your goals. What should you know about eating carbs? Managing the amount of carbohydrate (carbs) you eat is an important part of healthy meals when you have diabetes. Carbohydrate is found in many foods. · Learn which foods have carbs. And learn the amounts of carbs in different foods. ¨ Bread, cereal, pasta, and rice have about 15 grams of carbs in a serving. A serving is 1 slice of bread (1 ounce), ½ cup of cooked cereal, or 1/3 cup of cooked pasta or rice. ¨ Fruits have 15 grams of carbs in a serving. A serving is 1 small fresh fruit, such as an apple or orange; ½ of a banana; ½ cup of cooked or canned fruit; ½ cup of fruit juice; 1 cup of melon or raspberries; or 2 tablespoons of dried fruit. ¨ Milk and no-sugar-added yogurt have 15 grams of carbs in a serving. A serving is 1 cup of milk or 2/3 cup of no-sugar-added yogurt. ¨ Starchy vegetables have 15 grams of carbs in a serving. A serving is ½ cup of mashed potatoes or sweet potato; 1 cup winter squash; ½ of a small baked potato; ½ cup of cooked beans; or ½ cup cooked corn or green peas. · Learn how much carbs to eat each day and at each meal. A dietitian or CDE can teach you how to keep track of the amount of carbs you eat. This is called carbohydrate counting. · If you are not sure how to count carbohydrate grams, use the Plate Method to plan meals. It is a good, quick way to make sure that you have a balanced meal. It also helps you spread carbs throughout the day. ¨ Divide your plate by types of foods. Put non-starchy vegetables on half the plate, meat or other protein food on one-quarter of the plate, and a grain or starchy vegetable in the final quarter of the plate. To this you can add a small piece of fruit and 1 cup of milk or yogurt, depending on how many carbs you are supposed to eat at a meal. 
· Try to eat about the same amount of carbs at each meal. Do not \"save up\" your daily allowance of carbs to eat at one meal. 
· Proteins have very little or no carbs per serving. Examples of proteins are beef, chicken, turkey, fish, eggs, tofu, cheese, cottage cheese, and peanut butter. A serving size of meat is 3 ounces, which is about the size of a deck of cards. Examples of meat substitute serving sizes (equal to 1 ounce of meat) are 1/4 cup of cottage cheese, 1 egg, 1 tablespoon of peanut butter, and ½ cup of tofu. How can you eat out and still eat healthy? · Learn to estimate the serving sizes of foods that have carbohydrate. If you measure food at home, it will be easier to estimate the amount in a serving of restaurant food. · If the meal you order has too much carbohydrate (such as potatoes, corn, or baked beans), ask to have a low-carbohydrate food instead. Ask for a salad or green vegetables. · If you use insulin, check your blood sugar before and after eating out to help you plan how much to eat in the future. · If you eat more carbohydrate at a meal than you had planned, take a walk or do other exercise. This will help lower your blood sugar. What else should you know? · Limit saturated fat, such as the fat from meat and dairy products.  This is a healthy choice because people who have diabetes are at higher risk of heart disease. So choose lean cuts of meat and nonfat or low-fat dairy products. Use olive or canola oil instead of butter or shortening when cooking. · Don't skip meals. Your blood sugar may drop too low if you skip meals and take insulin or certain medicines for diabetes. · Check with your doctor before you drink alcohol. Alcohol can cause your blood sugar to drop too low. Alcohol can also cause a bad reaction if you take certain diabetes medicines. Follow-up care is a key part of your treatment and safety. Be sure to make and go to all appointments, and call your doctor if you are having problems. It's also a good idea to know your test results and keep a list of the medicines you take. Where can you learn more? Go to http://kamaljit-quan.info/. Enter I756 in the search box to learn more about \"Learning About Diabetes Food Guidelines. \" Current as of: March 13, 2017 Content Version: 11.4 © 6816-8020 [employer]. Care instructions adapted under license by [employer] (which disclaims liability or warranty for this information). If you have questions about a medical condition or this instruction, always ask your healthcare professional. [address] any warranty or liability for your use of this information. Introducing [facility] & HEALTH SERVICES! Keily Douglas introduces Audingo patient portal. Now you can access parts of your medical record, email your doctor's office, and request medication refills online. 1. In your internet browser, go to [email or web address] 2. Click on the First Time User? Click Here link in the Sign In box. You will see the New Member Sign Up page. 3. Enter your Audingo Access Code exactly as it appears below. You will not need to use this code after youve completed the sign-up process. If you do not sign up before the expiration date, you must request a new code. · Nervogrid Access Code: [email or web address] Expires: 1/3/2018  9:35 AM 
 
4. Enter the last four digits of your Social Security Number (xxxx) and Date of Birth (mm/dd/yyyy) as indicated and click Submit. You will be taken to the next sign-up page. 5. Create a Nervogrid ID. This will be your Nervogrid login ID and cannot be changed, so think of one that is secure and easy to remember. 6. Create a Nervogrid password. You can change your password at any time. 7. Enter your Password Reset Question and Answer. This can be used at a later time if you forget your password. 8. Enter your e-mail address. You will receive e-mail notification when new information is available in [address] 9. Click Sign Up. You can now view and download portions of your medical record. 10. Click the Download Summary menu link to download a portable copy of your medical information. If you have questions, please visit the Frequently Asked Questions section of the Nervogrid website. Remember, Nervogrid is NOT to be used for urgent needs. For medical emergencies, dial 911. Now available from your iPhone and Android! Please provide this summary of care documentation to your next provider. Your primary care clinician is listed as Garrel Gaucher. If you have any questions after today's visit, please call [phone].

## 2017-11-17 NOTE — PATIENT INSTRUCTIONS
Diabetes Foot Health: Care Instructions  Your Care Instructions    When you have diabetes, your feet need extra care and attention. Diabetes can damage the nerve endings and blood vessels in your feet, making you less likely to notice when your feet are injured. Diabetes also limits your body's ability to fight infection and get blood to areas that need it. If you get a minor foot injury, it could become an ulcer or a serious infection. With good foot care, you can prevent most of these problems. Caring for your feet can be quick and easy. Most of the care can be done when you are bathing or getting ready for bed. Follow-up care is a key part of your treatment and safety. Be sure to make and go to all appointments, and call your doctor if you are having problems. It's also a good idea to know your test results and keep a list of the medicines you take. How can you care for yourself at home? · Keep your blood sugar close to normal by watching what and how much you eat, monitoring blood sugar, taking medicines if prescribed, and getting regular exercise. · Do not smoke. Smoking affects blood flow and can make foot problems worse. If you need help quitting, talk to your doctor about stop-smoking programs and medicines. These can increase your chances of quitting for good. · Eat a diet that is low in fats. High fat intake can cause fat to build up in your blood vessels and decrease blood flow. · Inspect your feet daily for blisters, cuts, cracks, or sores. If you cannot see well, use a mirror or have someone help you. · Take care of your feet:  [place] your feet every day. Use warm (not hot) water. Check the water temperature with your wrists or other part of your body, not your feet. ¨ Dry your feet well. Pat them dry. Do not rub the skin on your feet too hard. Dry well between your toes. If the skin on your feet stays moist, bacteria or a fungus can grow, which can lead to infection.   ¨ Keep your skin soft. Use moisturizing skin cream to keep the skin on your feet soft and prevent calluses and cracks. But do not put the cream between your toes, and stop using any cream that causes a rash. ¨ Clean underneath your toenails carefully. Do not use a sharp object to clean underneath your toenails. Use the blunt end of a nail file or other rounded tool. ¨ Trim and file your toenails straight across to prevent ingrown toenails. Use a nail clipper, not scissors. Use an emery board to smooth the edges. · Change socks daily. Socks without seams are best, because seams often rub the feet. You can find socks for people with diabetes from specialty catalogs. · Look inside your shoes every day for things like gravel or torn linings, which could cause blisters or sores. · Buy shoes that fit well:  ¨ Look for shoes that have plenty of space around the toes. This helps prevent bunions and blisters. ¨ Try on shoes while wearing the kind of socks you will usually wear with the shoes. ¨ Avoid plastic shoes. They may rub your feet and cause blisters. Good shoes should be made of materials that are flexible and breathable, such as leather or cloth. ¨ Break in new shoes slowly by wearing them for no more than an hour a day for several days. Take extra time to check your feet for red areas, blisters, or other problems after you wear new shoes. · Do not go barefoot. Do not wear sandals, and do not wear shoes with very thin soles. Thin soles are easy to puncture. They also do not protect your feet from hot pavement or cold weather. · Have your doctor check your feet during each visit. If you have a foot problem, see your doctor. Do not try to treat an early foot problem at home. Home remedies or treatments that you can buy without a prescription (such as corn removers) can be harmful. · Always get early treatment for foot problems. A minor irritation can lead to a major problem if not properly cared for early.   When should you call for help? Call your doctor now or seek immediate medical care if:  ? · You have a foot sore, an ulcer or break in the skin that is not healing after 4 days, bleeding corns or calluses, or an ingrown toenail. ? · You have blue or black areas, which can mean bruising or blood flow problems. ? · You have peeling skin or tiny blisters between your toes or cracking or oozing of the skin. ? · You have a fever for more than 24 hours and a foot sore. ? · You have new numbness or tingling in your feet that does not go away after you move your feet or change positions. ? · You have unexplained or unusual swelling of the foot or ankle. ? Watch closely for changes in your health, and be sure to contact your doctor if:  ? · You cannot do proper foot care. Where can you learn more? Go to http://kamaljit-quan.info/. Enter A739 in the search box to learn more about \"Diabetes Foot Health: Care Instructions. \"  Current as of: March 13, 2017  Content Version: 11.4  © 1586-8005 [employer]. Care instructions adapted under license by [employer] (which disclaims liability or warranty for this information). If you have questions about a medical condition or this instruction, always ask your healthcare professional. [address] any warranty or liability for your use of this information. Learning About Diabetes Food Guidelines  Your Care Instructions    Meal planning is important to manage diabetes. It helps keep your blood sugar at a target level (which you set with your doctor). You don't have to eat special foods. You can eat what your family eats, including sweets once in a while. But you do have to pay attention to how often you eat and how much you eat of certain foods. You may want to work with a dietitian or a certified diabetes educator (CDE) to help you plan meals and snacks.  A dietitian or CDE can also help you lose weight if that is one of your goals. What should you know about eating carbs? Managing the amount of carbohydrate (carbs) you eat is an important part of healthy meals when you have diabetes. Carbohydrate is found in many foods. · Learn which foods have carbs. And learn the amounts of carbs in different foods. ¨ Bread, cereal, pasta, and rice have about 15 grams of carbs in a serving. A serving is 1 slice of bread (1 ounce), ½ cup of cooked cereal, or 1/3 cup of cooked pasta or rice. ¨ Fruits have 15 grams of carbs in a serving. A serving is 1 small fresh fruit, such as an apple or orange; ½ of a banana; ½ cup of cooked or canned fruit; ½ cup of fruit juice; 1 cup of melon or raspberries; or 2 tablespoons of dried fruit. ¨ Milk and no-sugar-added yogurt have 15 grams of carbs in a serving. A serving is 1 cup of milk or 2/3 cup of no-sugar-added yogurt. ¨ Starchy vegetables have 15 grams of carbs in a serving. A serving is ½ cup of mashed potatoes or sweet potato; 1 cup winter squash; ½ of a small baked potato; ½ cup of cooked beans; or ½ cup cooked corn or green peas. · Learn how much carbs to eat each day and at each meal. A dietitian or CDE can teach you how to keep track of the amount of carbs you eat. This is called carbohydrate counting. · If you are not sure how to count carbohydrate grams, use the Plate Method to plan meals. It is a good, quick way to make sure that you have a balanced meal. It also helps you spread carbs throughout the day. ¨ Divide your plate by types of foods. Put non-starchy vegetables on half the plate, meat or other protein food on one-quarter of the plate, and a grain or starchy vegetable in the final quarter of the plate.  To this you can add a small piece of fruit and 1 cup of milk or yogurt, depending on how many carbs you are supposed to eat at a meal.  · Try to eat about the same amount of carbs at each meal. Do not \"save up\" your daily allowance of carbs to eat at one meal.  · Proteins have very little or no carbs per serving. Examples of proteins are beef, chicken, turkey, fish, eggs, tofu, cheese, cottage cheese, and peanut butter. A serving size of meat is 3 ounces, which is about the size of a deck of cards. Examples of meat substitute serving sizes (equal to 1 ounce of meat) are 1/4 cup of cottage cheese, 1 egg, 1 tablespoon of peanut butter, and ½ cup of tofu. How can you eat out and still eat healthy? · Learn to estimate the serving sizes of foods that have carbohydrate. If you measure food at home, it will be easier to estimate the amount in a serving of restaurant food. · If the meal you order has too much carbohydrate (such as potatoes, corn, or baked beans), ask to have a low-carbohydrate food instead. Ask for a salad or green vegetables. · If you use insulin, check your blood sugar before and after eating out to help you plan how much to eat in the future. · If you eat more carbohydrate at a meal than you had planned, take a walk or do other exercise. This will help lower your blood sugar. What else should you know? · Limit saturated fat, such as the fat from meat and dairy products. This is a healthy choice because people who have diabetes are at higher risk of heart disease. So choose lean cuts of meat and nonfat or low-fat dairy products. Use olive or canola oil instead of butter or shortening when cooking. · Don't skip meals. Your blood sugar may drop too low if you skip meals and take insulin or certain medicines for diabetes. · Check with your doctor before you drink alcohol. Alcohol can cause your blood sugar to drop too low. Alcohol can also cause a bad reaction if you take certain diabetes medicines. Follow-up care is a key part of your treatment and safety. Be sure to make and go to all appointments, and call your doctor if you are having problems. It's also a good idea to know your test results and keep a list of the medicines you take. Where can you learn more?   Go to http://kamaljit-quan.info/. Enter J593 in the search box to learn more about \"Learning About Diabetes Food Guidelines. \"  Current as of: March 13, 2017  Content Version: 11.4  © 7455-9181 [employer]. Care instructions adapted under license by [employer] (which disclaims liability or warranty for this information). If you have questions about a medical condition or this instruction, always ask your healthcare professional. [place] [address] any warranty or liability for your use of this information.

## 2017-11-17 NOTE — PROGRESS NOTES
Juliann Boast is a 81 y.o. female who presents to the office today with the following:  Chief Complaint   Patient presents with    Diabetes     follow up on diabetes 3 months       HPI  Here for routine f/u T2DM- NID. On multiple medications (previously declined insulin) but doing well. Tolerating w/o SEs. A1c has improved significantly from previous 12. Lab Results   Component Value Date/Time    Hemoglobin A1c 6.6 08/17/2017 10:53 AM   No hypoglycemic episodes. Checks FBS and afternoon regularly and reports in 100-130 in AM and PM.  No foot complaints. No n/t/w/s, ulcers/wounds. Admits does cheat on diet sometimes, but rarely, monitors sugars and otherwise eats pretty healthy. HTN  BP has also \"been great until now\". Notes just found out loved one at 81 yo has maybe 3 weeks to live due to lung CA. She is sad and has to travel soon for this, but reports no significant sxs of depression. Says normally at home BP has been running 110s/80s. S/p pacemaker, doing well. Next apt with Cardiology January. Otherwise feeling well with no other complaints or acute concerns. No recent HAs, CP, SOB, or TIA sxs. Review of Systems   Constitutional: Negative for fever, malaise/fatigue and weight loss. Eyes: Negative. Respiratory: Negative for cough and shortness of breath. Cardiovascular: Negative for chest pain and leg swelling. Gastrointestinal: Negative. Genitourinary: Negative. Musculoskeletal: Negative for myalgias. Skin: Negative for rash. Neurological: Negative for dizziness and headaches. See HPI.     Past Medical History:   Diagnosis Date    Diabetes ([facility] [address]     Diplopia 4/5/2017    Mobitz (type) II atrioventricular block 4/5/2017    Proteinuria     S/P cardiac pacemaker procedure 4/6/2017 4/6/17 Medtronic dual chamber pacemaker implant    Tobacco abuse 4/5/2017       Past Surgical History:   Procedure Laterality Date    HX APPENDECTOMY      HX CHOLECYSTECTOMY      HX COLONOSCOPY  2014    nl, q 2-3 y    HX GYN      hysterectomy    HX ORTHOPAEDIC  1979    back    HX PACEMAKER  04/06/2017    pacemaker placed on the left side       Allergies   Allergen Reactions    Codeine Hives and Shortness of Breath     Throat closes shut    Flowers Anaphylaxis     roses    Keflex [Cephalexin] Hives and Shortness of Breath     Throat closes shut    Pcn [Penicillins] Hives and Shortness of Breath     Throat closes shut    Metformin Diarrhea    Ciprofloxacin Shortness of Breath    Morphine Angioedema     Throat swells    Adhesive Tape-Silicones Rash       Current Outpatient Prescriptions   Medication Sig    losartan (COZAAR) 50 mg tablet Take 1 Tab by mouth daily.  SITagliptin (JANUVIA) 100 mg tablet TAKE 1 TABLET EVERY DAY    glipiZIDE (GLUCOTROL) 10 mg tablet TAKE 1 TABLET TWICE DAILY    dulaglutide (TRULICITY) 3.50 LV/6.7 mL sub-q pen INJECT 1 SYRINGE EVERY 7 DAYS AS DIRECTED    TRUE METRIX AIR GLUCOSE METER monitoring kit USE AS DIRECTED    glucose blood VI test strips (ASCENSIA AUTODISC VI, ONE TOUCH ULTRA TEST VI) strip TID testing    Lancets misc TID testing.  vit [id number]-A-[id number]-omega-3-ala-dha 250-3-50 unit,mg,unit chew Take 1 Tab by mouth daily.  cholecalciferol (VITAMIN D3) 1,000 unit cap Take 1,000 Units by mouth daily.  ferrous sulfate 325 mg (65 mg iron) tablet Take  by mouth Daily (before breakfast).  vitamin E (AQUA GEMS) 400 unit capsule Take 400 Units by mouth daily.  ascorbic acid (VITAMIN C) 500 mg tablet Take 500 mg by mouth daily.  multivitamin (ONE A DAY) tablet Take 1 Tab by mouth daily.  cyanocobalamin (VITAMIN B-12) 1,000 mcg tablet Take 1,000 mcg by mouth daily.  scopolamine (TRANSDERM-SCOP) 1.5 mg (1 mg over 3 days) pt3d 1 Patch by TransDERmal route every seventy-two (72) hours.  clotrimazole (LOTRIMIN) 1 % topical cream Apply  to affected area two (2) times a day.  PRN     No current facility-administered medications for this visit. Social History     Social History    Marital status: SINGLE     Spouse name: N/A    Number of children: N/A    Years of education: N/A     Social History Main Topics    Smoking status: Current Every Day Smoker     Packs/day: 1.00     Types: Cigarettes    Smokeless tobacco: Never Used    Alcohol use Yes      Comment: rare    Drug use: No    Sexual activity: Not Currently     Partners: Male     Other Topics Concern    None     Social History Narrative       Family History   Problem Relation Age of Onset    COPD Mother     Asthma Mother     Heart Disease Father     Cancer Maternal Grandmother     Stroke Maternal Grandmother     Cancer Maternal Grandfather     Cancer Paternal Grandmother     Heart Disease Paternal Grandfather          Physical Exam:  Visit Vitals    /77 (BP 1 Location: Right arm, BP Patient Position: Sitting)    Pulse 87    Temp 97.8 °F (36.6 °C) (Oral)    Resp 20    Ht 5' 3\" (1.6 m)    Wt 164 lb (74.4 kg)    SpO2 97%    BMI 29.05 kg/m2     Physical Exam   Constitutional: She is oriented to person, place, and time and well-developed, well-nourished, and in no distress. HENT:   Head: Normocephalic and atraumatic. Right Ear: External ear normal.   Left Ear: External ear normal.   Eyes: Conjunctivae and EOM are normal.   Neck: Normal range of motion. Neck supple. Cardiovascular: Normal rate, regular rhythm and intact distal pulses. pacemaker   Pulmonary/Chest: Effort normal and breath sounds normal.   Abdominal: Soft. There is no tenderness. Musculoskeletal: Normal range of motion. She exhibits no edema. Feet a little dry, min callus, but otherwise nl foot exam.  No ulcers, normal sensation. Normal pulses. Lymphadenopathy:     She has no cervical adenopathy. Neurological: She is alert and oriented to person, place, and time. Gait normal.   Skin: Skin is warm and dry.    Psychiatric: Mood and affect normal.   Nursing note and vitals reviewed. Assessment/Plan:    ICD-10-CM ICD-9-CM    1. Type 2 diabetes mellitus with diabetic nephropathy, without long-term current use of insulin (HCC) E11.21 250.40 MICROALBUMIN, UR, RAND W/ MICROALBUMIN/CREA RATIO     583.81 HEMOGLOBIN A1C WITH EAG      METABOLIC PANEL, BASIC      SITagliptin (JANUVIA) 100 mg tablet      glipiZIDE (GLUCOTROL) 10 mg tablet   2. Benign essential HTN I10 401.1 losartan (COZAAR) 50 mg tablet   3. Poorly controlled diabetes mellitus (HCC) E11.65 250.00 dulaglutide (TRULICITY) 6.96 LV/2.2 mL sub-q pen     Strongly advised labs today, as reason for f/u. However, pt would just really like to leave due to bad news. She agrees to return in near future for labs/urine- future ordered. Reiterated LMs, counseled on BMI, and discussed bringing in ACP copy. Will otherwise cont to monitor her BS and BP closely at home and rtc/seek care for any unusual readings/concerns or sxs. BP suboptimal today, but again pt says due to recent loss and will keep close eye on at home. Seek immediate care for any red flag sxs in interim. Follow-up Disposition:  Return in about 3 months (around 2/17/2018), or soon for labs and sooner apt prn.     Gayatri Carter PA-C

## 2017-11-17 NOTE — ACP (ADVANCE CARE PLANNING)
Advance Care Planning (ACP) Provider Conversation Snapshot    Date of ACP Conversation: 11/17/17  Persons included in Conversation:  patient  Length of ACP Conversation in minutes:  <16 minutes (Non-Billable)      Has one at home, will bring in copy.

## 2017-11-17 NOTE — PROGRESS NOTES
Chief Complaint   Patient presents with    Diabetes     follow up on diabetes 3 months     Medication taken this morning. Patient is not fasting. Zack Omalley LPN

## 2017-11-27 DIAGNOSIS — E11.21 TYPE 2 DIABETES MELLITUS WITH DIABETIC NEPHROPATHY, WITHOUT LONG-TERM CURRENT USE OF INSULIN (HCC): ICD-10-CM

## 2017-11-27 RX ORDER — BLOOD-GLUCOSE METER
EACH MISCELLANEOUS
Qty: 1 KIT | Refills: 0 | Status: SHIPPED | OUTPATIENT
Start: 2017-11-27

## 2018-02-15 DIAGNOSIS — E11.9 TYPE 2 DIABETES MELLITUS WITHOUT COMPLICATION, WITHOUT LONG-TERM CURRENT USE OF INSULIN (HCC): ICD-10-CM

## 2018-02-16 RX ORDER — LANCETS 33 GAUGE
EACH MISCELLANEOUS
Qty: 100 LANCET | Refills: 11 | Status: SHIPPED | OUTPATIENT
Start: 2018-02-16 | End: 2018-09-06 | Stop reason: SDUPTHER

## 2018-02-16 RX ORDER — CALCIUM CITRATE/VITAMIN D3 200MG-6.25
TABLET ORAL
Qty: 300 STRIP | Refills: 11 | Status: SHIPPED | OUTPATIENT
Start: 2018-02-16 | End: 2018-09-06 | Stop reason: SDUPTHER

## 2018-03-16 ENCOUNTER — DOCUMENTATION ONLY (OUTPATIENT)
Dept: CARDIOLOGY CLINIC | Age: 73
End: 2018-03-16

## 2018-03-16 NOTE — PROGRESS NOTES
Pt walked in at 10am and stood in the waiting room. Asked if I could help the pt and she refused. Received a call from Lynnette-SALEEM from the front area stating, \"Ms. Jj Balbuena states that she cannot wait until 11:20am for her appt. Pt claims that her appt was at 10am.\"    I spoke with the pt and informed her that I personally spoke with her on 1/24/18 when she asked to reschedule her appt that day. I rescheduled her for today at 11AM arrival.  Pt claims that the reminder call stated 10AM.  Informed her that the pacemaker representative has not arrived and Dr. Kristopher Pickens cannot see her until her scheduled time unless some one does not show beforehand. Pt states, \"I cannot stay. I will have to reschedule\"  Explained the importance of pacemaker checks and that we have rescheduled her twice already. Informed her that I do not have a pacemaker rep coming back to the [address] [place] [address] area until June. Pt is due to see Dr. Trace Aguilar the EP around that time so she will need to be scheduled at the [place] office. Pt states, \" NOPE. I will not go to that office. \"  Explained to her that a EP needs to follow her device and we check the pacemakers in the local office for convenience. Pt still refuses. Dr. Kristopher Pickens has been made aware. Her follow up with Dr. Kristopher Pickens has been rescheduled. Pacemaker checks will be re discussed at that time.       APPT history:  07/27/2017 edithon4: 6 mo [employer] PACEMAKER CHECK (Needs appt with florinda next and be set up on CL)   01/24/2018 justa3: 6 mo [employer] PACEMAKER CHECK (Needs appt with florinad next and be set up on CL),jennifer   01/24/2018 [email or web address]: r/s with the pt - pts car broke down (cant do 1/26/18) - no clincs in FEB (pt refuses [facility].) $45cp  03/16/2018 dtinder: angus w/Dr Kristopher Pickens only $45 cp (Edit)

## 2018-03-20 ENCOUNTER — OFFICE VISIT (OUTPATIENT)
Dept: FAMILY MEDICINE CLINIC | Age: 73
End: 2018-03-20

## 2018-03-20 VITALS
SYSTOLIC BLOOD PRESSURE: 134 MMHG | OXYGEN SATURATION: 96 % | WEIGHT: 170.6 LBS | HEART RATE: 77 BPM | DIASTOLIC BLOOD PRESSURE: 70 MMHG | TEMPERATURE: 97.8 F | RESPIRATION RATE: 16 BRPM | BODY MASS INDEX: 30.22 KG/M2

## 2018-03-20 DIAGNOSIS — Z72.0 TOBACCO ABUSE: ICD-10-CM

## 2018-03-20 DIAGNOSIS — B35.3 TINEA PEDIS OF BOTH FEET: ICD-10-CM

## 2018-03-20 DIAGNOSIS — E11.9 TYPE 2 DIABETES MELLITUS WITHOUT COMPLICATION, WITHOUT LONG-TERM CURRENT USE OF INSULIN (HCC): Primary | ICD-10-CM

## 2018-03-20 DIAGNOSIS — E11.21 TYPE 2 DIABETES MELLITUS WITH DIABETIC NEPHROPATHY, WITHOUT LONG-TERM CURRENT USE OF INSULIN (HCC): ICD-10-CM

## 2018-03-20 DIAGNOSIS — I10 BENIGN ESSENTIAL HTN: ICD-10-CM

## 2018-03-20 DIAGNOSIS — B35.1 ONYCHOMYCOSIS: ICD-10-CM

## 2018-03-20 RX ORDER — CHLORPHENIRAMINE MALEATE 4 MG
TABLET ORAL 2 TIMES DAILY
Qty: 45 G | Refills: 1 | Status: SHIPPED | OUTPATIENT
Start: 2018-03-20 | End: 2018-05-10 | Stop reason: SDUPTHER

## 2018-03-20 RX ORDER — LOSARTAN POTASSIUM 50 MG/1
50 TABLET ORAL DAILY
Qty: 90 TAB | Refills: 1 | Status: SHIPPED | OUTPATIENT
Start: 2018-03-20 | End: 2018-08-14 | Stop reason: SDUPTHER

## 2018-03-20 RX ORDER — GLIPIZIDE 10 MG/1
TABLET ORAL
Qty: 180 TAB | Refills: 1 | Status: SHIPPED | OUTPATIENT
Start: 2018-03-20 | End: 2018-09-06 | Stop reason: SDUPTHER

## 2018-03-20 NOTE — PROGRESS NOTES
Anne Marie Jacob is a 81 y.o. female who presents to the office today with the following:  Chief Complaint   Patient presents with    Diabetes     pt here for 3 mo f/u med refills       HPI   Here for routine f/u. Due for labs but would like to return. Feeling well today with no complaints. Has not been checking sugars since she has been feeling and doing so well. BP also has been stable. No n/t/w/s, no foot wounds/sxs. Health Maintenance Due   Topic Date Due    GLAUCOMA SCREENING Q2Y  06/20/2012  in [facility] Dec 2017    MICROALBUMIN Q1  06/14/2017    HEMOGLOBIN A1C Q6M  02/17/2018 will return soon for labs     Would also like refill for tinea flare ups. Responds well to topical clotrimazole. Review of Systems   Constitutional: Negative for fever and weight loss. Respiratory: Negative for cough and shortness of breath. Cardiovascular: Negative for chest pain and leg swelling. Gastrointestinal: Negative. Genitourinary: Negative. Neurological: Negative. See HPI.     Past Medical History:   Diagnosis Date    Diabetes ([facility] [address]     Diplopia 4/5/2017    Hypertension     Mobitz (type) II atrioventricular block 4/5/2017    Proteinuria     S/P cardiac pacemaker procedure 4/6/2017 4/6/17 Medtronic dual chamber pacemaker implant    Tobacco abuse 4/5/2017       Past Surgical History:   Procedure Laterality Date    HX APPENDECTOMY      HX CHOLECYSTECTOMY      HX COLONOSCOPY  2014    nl, q 2-3 y    HX GYN      hysterectomy    HX ORTHOPAEDIC  1979    back    HX PACEMAKER  04/06/2017    pacemaker placed on the left side       Allergies   Allergen Reactions    Codeine Hives and Shortness of Breath     Throat closes shut    Flowers Anaphylaxis     roses    Keflex [Cephalexin] Hives and Shortness of Breath     Throat closes shut    Pcn [Penicillins] Hives and Shortness of Breath     Throat closes shut    Metformin Diarrhea    Ciprofloxacin Shortness of Breath    Morphine Angioedema Throat swells    Adhesive Tape-Silicones Rash       Current Outpatient Prescriptions   Medication Sig    losartan (COZAAR) 50 mg tablet Take 1 Tab by mouth daily.  SITagliptin (JANUVIA) 100 mg tablet TAKE 1 TABLET EVERY DAY    glipiZIDE (GLUCOTROL) 10 mg tablet TAKE 1 TABLET TWICE DAILY    clotrimazole (LOTRIMIN) 1 % topical cream Apply  to affected area two (2) times a day. PRN    dulaglutide (TRULICITY) 3.68 KZ/3.1 mL sub-q pen INJECT 1 SYRINGE EVERY 7 DAYS AS DIRECTED    TRUE METRIX GLUCOSE TEST STRIP strip TEST THREE TIMES DAILY    TRUEPLUS LANCETS 33 gauge misc TEST THREE TIMES DAILY    TRUE METRIX AIR GLUCOSE METER monitoring kit USE AS DIRECTED    Lancets misc TID testing.  vit L-A-H0-E-omega-3-ala-dha 250-3-50 unit,mg,unit chew Take 1 Tab by mouth daily.  cholecalciferol (VITAMIN D3) 1,000 unit cap Take 1,000 Units by mouth daily.  ferrous sulfate 325 mg (65 mg iron) tablet Take  by mouth Daily (before breakfast).  vitamin E (AQUA GEMS) 400 unit capsule Take 400 Units by mouth daily.  ascorbic acid (VITAMIN C) 500 mg tablet Take 500 mg by mouth daily.  multivitamin (ONE A DAY) tablet Take 1 Tab by mouth daily.  cyanocobalamin (VITAMIN B-12) 1,000 mcg tablet Take 1,000 mcg by mouth daily. No current facility-administered medications for this visit.         Social History     Social History    Marital status: SINGLE     Spouse name: N/A    Number of children: N/A    Years of education: N/A     Social History Main Topics    Smoking status: Current Every Day Smoker     Packs/day: 0.50     Types: Cigarettes    Smokeless tobacco: Never Used    Alcohol use Yes      Comment: rare    Drug use: No    Sexual activity: Not Currently     Partners: Male     Other Topics Concern    None     Social History Narrative       Family History   Problem Relation Age of Onset    COPD Mother     Asthma Mother     Heart Disease Father     Cancer Maternal Grandmother     Stroke Maternal Grandmother     Cancer Maternal Grandfather     Cancer Paternal Grandmother     Heart Disease Paternal Grandfather          Physical Exam:  Visit Vitals    /70 (BP 1 Location: Right arm, BP Patient Position: Sitting)    Pulse 77    Temp 97.8 °F (36.6 °C) (Oral)    Resp 16    Wt 170 lb 9.6 oz (77.4 kg)    SpO2 96%    BMI 30.22 kg/m2     Physical Exam   Constitutional: She is oriented to person, place, and time and well-developed, well-nourished, and in no distress. HENT:   Head: Normocephalic and atraumatic. Eyes: Conjunctivae are normal.   Neck: Normal range of motion. Neck supple. Cardiovascular: Normal rate and regular rhythm. pacemaker   Pulmonary/Chest: Effort normal and breath sounds normal.   Musculoskeletal: She exhibits no edema or tenderness. Neurological: She is alert and oriented to person, place, and time. Gait normal.   Skin: Skin is warm and dry. Psychiatric: Mood and affect normal.       Assessment/Plan:    ICD-10-CM ICD-9-CM    1. Type 2 diabetes mellitus without complication, without long-term current use of insulin ([facility]) E11.9 250.00 dulaglutide (TRULICITY) 8.10 FA/5.8 mL sub-q pen      HEMOGLOBIN A1C WITH EAG      LIPID PANEL      CBC WITH AUTOMATED DIFF      METABOLIC PANEL, COMPREHENSIVE      MICROALBUMIN, UR, RAND W/ MICROALB/CREAT RATIO   2. Benign essential HTN I10 401.1 losartan (COZAAR) 50 mg tablet      LIPID PANEL   3. Type 2 diabetes mellitus with diabetic nephropathy, without long-term current use of insulin ([facility]) E11.21 250.40 SITagliptin (JANUVIA) 100 mg tablet     583.81 glipiZIDE (GLUCOTROL) 10 mg tablet   4. Tinea pedis of both feet B35.3 110.4 clotrimazole (LOTRIMIN) 1 % topical cream   5. Onychomycosis B35.1 110.1 clotrimazole (LOTRIMIN) 1 % topical cream   6. Tobacco abuse Z72.0 305.1      Will return fasting for labs. F/u 3-6 months pending results. Follow-up Disposition:  Return if symptoms worsen or fail to improve.     Negrito Major CHEKO

## 2018-03-20 NOTE — PROGRESS NOTES
Chief Complaint   Patient presents with    Diabetes     pt here for 3 mo f/u med refills     Visit Vitals    /70 (BP 1 Location: Right arm, BP Patient Position: Sitting)    Pulse 77    Temp 97.8 °F (36.6 °C) (Oral)    Resp 16    Wt 170 lb 9.6 oz (77.4 kg)    SpO2 96%    BMI 30.22 kg/m2     Mark Barnes LPN

## 2018-03-20 NOTE — MR AVS SNAPSHOT
[facility] 
[phone] Patient: Grupo Stone MRN: [id number] YIL:[id number] Visit Information Date & Time Provider Department Dept. Phone Encounter #  
 3/20/2018  9:30 AM Jason Paez PA-C [address] [id number] Your Appointments 4/26/2018 10:20 AM  
ESTABLISHED PATIENT with Alessandra Kinsey MD  
[address] [place] [facility]) Appt Note: 6 mo [employer] PACEMAKER CHECK (Needs appt with florinda next and be set up on CL); 6 mo [address] (Needs appt with florinda next and be set up on CL),jaa; r/s with the pt - pts car broke down (cant do 1/26/18) - no clincs in FEB (pt refuses mech.) $45cp; fu w/Dr Gigi Ortiz only  $ cp  
 [address] [address] [phone]  
  
   
 [address] [place] [address] Upcoming Health Maintenance Date Due  
 GLAUCOMA SCREENING Q2Y 6/20/2012 MICROALBUMIN Q1 6/14/2017 HEMOGLOBIN A1C Q6M 2/17/2018 FOOT EXAM Q1 5/10/2018 LIPID PANEL Q1 8/17/2018 MEDICARE YEARLY EXAM 8/18/2018 BREAST CANCER SCRN MAMMOGRAM 1/4/2019 DTaP/Tdap/Td series (2 - Td) 1/4/2027 COLONOSCOPY 1/4/2027 Allergies as of 3/20/2018  Review Complete On: 3/20/2018 By: Jason Paez PA-C Severity Noted Reaction Type Reactions Codeine High 06/07/2014    Hives, Shortness of Breath Throat closes shut Flowers High 04/05/2017    Anaphylaxis  
 roses Keflex [Cephalexin] High 06/07/2014    Hives, Shortness of Breath Throat closes shut Pcn [Penicillins] High 06/07/2014    Hives, Shortness of Breath Throat closes shut Metformin Medium 05/05/2017    Diarrhea Ciprofloxacin  06/18/2016    Shortness of Breath Morphine  01/04/2017    Angioedema Throat swells Adhesive Tape-silicones Low 88/25/0559    Rash Current Immunizations  Never Reviewed No immunizations on file. Not reviewed this visit You Were Diagnosed With   
  
 Codes Comments Poorly controlled diabetes mellitus ([facility] [address]     ICD-10-CM: E11.65 ICD-9-CM: 250.00 Benign essential HTN     ICD-10-CM: I10 
ICD-9-CM: 401.1 Type 2 diabetes mellitus with diabetic nephropathy, without long-term current use of insulin ([facility])     ICD-10-CM: E11.21 
ICD-9-CM: 250.40, 583.81 Tinea pedis of both feet     ICD-10-CM: B35.3 ICD-9-CM: 110.4 Onychomycosis     ICD-10-CM: B35.1 ICD-9-CM: 110.1 Vitals BP Pulse Temp Resp Weight(growth percentile) SpO2  
 134/70 (BP 1 Location: Right arm, BP Patient Position: Sitting) 77 97.8 °F (36.6 °C) (Oral) 16 170 lb 9.6 oz (77.4 kg) 96% BMI OB Status Smoking Status 30.22 kg/m2 Hysterectomy Current Every Day Smoker BMI and BSA Data Body Mass Index Body Surface Area  
 30.22 kg/m 2 1.85 m 2 Preferred Pharmacy Pharmacy Name Phone [facility] [address] [place] [address] Your Updated Medication List  
  
   
This list is accurate as of 3/20/18 10:33 AM.  Always use your most recent med list.  
  
  
  
  
 ascorbic acid (vitamin C) 500 mg tablet Commonly known as:  VITAMIN C Take 500 mg by mouth daily. cholecalciferol 1,000 unit Cap Commonly known as:  VITAMIN D3 Take 1,000 Units by mouth daily. clotrimazole 1 % topical cream  
Commonly known as:  Jan Monterey Apply  to affected area two (2) times a day. PRN  
  
 dulaglutide 0.75 mg/0.5 mL sub-q pen Commonly known as:  TRULICITY INJECT 1 SYRINGE EVERY 7 DAYS AS DIRECTED  
  
 ferrous sulfate 325 mg (65 mg iron) tablet Take  by mouth Daily (before breakfast). glipiZIDE 10 mg tablet Commonly known as:  GLUCOTROL  
TAKE 1 TABLET TWICE DAILY * Lancets Misc TID testing. * TRUEPLUS LANCETS 33 gauge Misc Generic drug:  lancets TEST THREE TIMES DAILY losartan 50 mg tablet Commonly known as:  COZAAR Take 1 Tab by mouth daily. multivitamin tablet Commonly known as:  ONE A DAY Take 1 Tab by mouth daily. SITagliptin 100 mg tablet Commonly known as:  Debbie Blas TAKE 1 TABLET EVERY DAY  
  
 TRUE METRIX AIR GLUCOSE METER monitoring kit Generic drug:  Blood-Glucose Meter USE AS DIRECTED  
  
 TRUE METRIX GLUCOSE TEST STRIP strip Generic drug:  glucose blood VI test strips TEST THREE TIMES DAILY  
  
 vit O-Y-A1-E-omega-3-ala-dha 250-3-50 unit,mg,unit Annella Irons Take 1 Tab by mouth daily. VITAMIN B-12 1,000 mcg tablet Generic drug:  cyanocobalamin Take 1,000 mcg by mouth daily. vitamin E 400 unit capsule Commonly known as:  [address] Take 400 Units by mouth daily. * Notice: This list has 2 medication(s) that are the same as other medications prescribed for you. Read the directions carefully, and ask your doctor or other care provider to review them with you. Prescriptions Sent to Pharmacy Refills  
 losartan (COZAAR) 50 mg tablet 1 Sig: Take 1 Tab by mouth daily. Class: Normal  
 Pharmacy: [address] [place] [address] Ph #: [phone] Route: Oral  
 SITagliptin (JANUVIA) 100 mg tablet 1 Sig: TAKE 1 TABLET EVERY DAY Class: Normal  
 Pharmacy: [address] [place] [address] Ph #: [phone]  
 glipiZIDE (GLUCOTROL) 10 mg tablet 1 Sig: TAKE 1 TABLET TWICE DAILY Class: Normal  
 Pharmacy: [address] [place] [address] Ph #: [phone]  
 clotrimazole (LOTRIMIN) 1 % topical cream 1 Sig: Apply  to affected area two (2) times a day. PRN Class: Normal  
 Pharmacy: [address] [place] [address] Ph #: [phone]  Route: Topical  
 dulaglutide (TRULICITY) 9.80 MB/4.2 mL sub-q pen 3  
 Sig: INJECT 1 SYRINGE EVERY 7 DAYS AS DIRECTED Class: Normal  
 Pharmacy: [facility] [address] [facility], [address] [place] [address] [address]  #: [phone] Introducing [facility] SERVICES! [facility] introduces [employer] patient portal. Now you can access parts of your medical record, email your doctor's office, and request medication refills online. 1. In your internet browser, go to [email or web address] 2. Click on the First Time User? Click Here link in the Sign In box. You will see the New Member Sign Up page. 3. Enter your [employer] Access Code exactly as it appears below. You will not need to use this code after youve completed the sign-up process. If you do not sign up before the expiration date, you must request a new code. · [employer] Access Code: [email or web address] Expires: 6/18/2018 10:33 AM 
 
4. Enter the last four digits of your Social Security Number (xxxx) and Date of Birth (mm/dd/yyyy) as indicated and click Submit. You will be taken to the next sign-up page. 5. Create a [employer] ID. This will be your [employer] login ID and cannot be changed, so think of one that is secure and easy to remember. 6. Create a [employer] password. You can change your password at any time. 7. Enter your Password Reset Question and Answer. This can be used at a later time if you forget your password. 8. Enter your e-mail address. You will receive e-mail notification when new information is available in [address] 9. Click Sign Up. You can now view and download portions of your medical record. 10. Click the Download Summary menu link to download a portable copy of your medical information. If you have questions, please visit the Frequently Asked Questions section of the [employer] website. Remember, [employer] is NOT to be used for urgent needs. For medical emergencies, dial 911. Now available from your iPhone and Android! Please provide this summary of care documentation to your next provider. Your primary care clinician is listed as Jason Paez. If you have any questions after today's visit, please call [phone].

## 2018-03-20 NOTE — LETTER
5/14/2018 [address] [address] At a recent visit,  you were given instructions to return to the office to have lab work  done. As of this date you have not returned to the office to have these test done. As your provider, I still want you to have this testing so please schedule. If you have had the test/procedure performed elsewhere, please contact our office so we can get the information and have your provider review the results. Otherwise, please return to the clinic to have the lab work drawn. Results of all testing (normal or abnormal) will be reported to you by this office once received. Sincerely, CHEKO Anders [facility] [address] 
[phone]

## 2018-05-10 DIAGNOSIS — B35.3 TINEA PEDIS OF BOTH FEET: ICD-10-CM

## 2018-05-10 DIAGNOSIS — B35.1 ONYCHOMYCOSIS: ICD-10-CM

## 2018-05-10 RX ORDER — CHLORPHENIRAMINE MALEATE 4 MG
TABLET ORAL
Qty: 45 G | Refills: 1 | Status: SHIPPED | OUTPATIENT
Start: 2018-05-10 | End: 2018-07-06 | Stop reason: SDUPTHER

## 2018-05-30 ENCOUNTER — CLINICAL SUPPORT (OUTPATIENT)
Dept: FAMILY MEDICINE CLINIC | Age: 73
End: 2018-05-30

## 2018-05-30 DIAGNOSIS — I10 BENIGN ESSENTIAL HTN: ICD-10-CM

## 2018-05-30 DIAGNOSIS — E11.9 TYPE 2 DIABETES MELLITUS WITHOUT COMPLICATION, WITHOUT LONG-TERM CURRENT USE OF INSULIN (HCC): ICD-10-CM

## 2018-05-30 NOTE — PROGRESS NOTES
Patient in office for future lab orders drawn, orders from CHEKO Dimas.  venipuncture (R) AC, patient tolerated procedure well.

## 2018-05-31 LAB
ALBUMIN/CREAT UR: 871.8 MG/G CREAT (ref 0–30)
CREAT UR-MCNC: 43.9 MG/DL
MICROALBUMIN UR-MCNC: 382.7 UG/ML

## 2018-06-01 NOTE — PROGRESS NOTES
*please see why CBC and A1c canceled and see if able to add- if unable, will need to have pt come back by for lab, need these results. Notify pt spilling more protein in urine- need strict control of DM and HTN to prevent further kidney damage. Serum kidney and liver tests currently okay. Will see what A1c is and also be sure to stay well-hydrated, avoid taking nsaids. (already on [facility] med). LDL \"bad \" chol borderline but total and good chol and trig are okay. Cont to avoid foods high in trans/sat fats, eat healthy fats/omega 3 fish oil, stay active, maintain a healthy wt. FYI Glucose was 150 in office. Will call with other results when they are in with any further rec.

## 2018-06-02 LAB
ALBUMIN SERPL-MCNC: 4.3 G/DL (ref 3.5–4.8)
ALBUMIN/GLOB SERPL: 1.6 {RATIO} (ref 1.2–2.2)
ALP SERPL-CCNC: 69 IU/L (ref 39–117)
ALT SERPL-CCNC: 17 IU/L (ref 0–32)
AST SERPL-CCNC: 19 IU/L (ref 0–40)
BILIRUB SERPL-MCNC: 0.3 MG/DL (ref 0–1.2)
BUN SERPL-MCNC: 18 MG/DL (ref 8–27)
BUN/CREAT SERPL: 21 (ref 12–28)
CALCIUM SERPL-MCNC: 10.2 MG/DL (ref 8.7–10.3)
CHLORIDE SERPL-SCNC: 103 MMOL/L (ref 96–106)
CHOLEST SERPL-MCNC: 175 MG/DL (ref 100–199)
CO2 SERPL-SCNC: 26 MMOL/L (ref 18–29)
CREAT SERPL-MCNC: 0.87 MG/DL (ref 0.57–1)
GFR SERPLBLD CREATININE-BSD FMLA CKD-EPI: 68 ML/MIN/1.73
GFR SERPLBLD CREATININE-BSD FMLA CKD-EPI: 78 ML/MIN/1.73
GLOBULIN SER CALC-MCNC: 2.7 G/DL (ref 1.5–4.5)
GLUCOSE SERPL-MCNC: 150 MG/DL (ref 65–99)
HDLC SERPL-MCNC: 49 MG/DL
INTERPRETATION, 910389: NORMAL
LDLC SERPL CALC-MCNC: 100 MG/DL (ref 0–99)
POTASSIUM SERPL-SCNC: 4.5 MMOL/L (ref 3.5–5.2)
PROT SERPL-MCNC: 7 G/DL (ref 6–8.5)
SODIUM SERPL-SCNC: 142 MMOL/L (ref 134–144)
TRIGL SERPL-MCNC: 128 MG/DL (ref 0–149)
VLDLC SERPL CALC-MCNC: 26 MG/DL (ref 5–40)

## 2018-06-05 ENCOUNTER — OFFICE VISIT (OUTPATIENT)
Dept: CARDIOLOGY CLINIC | Age: 73
End: 2018-06-05

## 2018-06-05 VITALS
HEIGHT: 63 IN | SYSTOLIC BLOOD PRESSURE: 140 MMHG | HEART RATE: 80 BPM | OXYGEN SATURATION: 100 % | BODY MASS INDEX: 30.83 KG/M2 | DIASTOLIC BLOOD PRESSURE: 90 MMHG | RESPIRATION RATE: 18 BRPM | WEIGHT: 174 LBS

## 2018-06-05 DIAGNOSIS — I44.1 SECOND DEGREE AV BLOCK: Primary | ICD-10-CM

## 2018-06-05 DIAGNOSIS — I10 BENIGN ESSENTIAL HTN: ICD-10-CM

## 2018-06-05 DIAGNOSIS — Z95.0 S/P CARDIAC PACEMAKER PROCEDURE: ICD-10-CM

## 2018-06-05 DIAGNOSIS — E11.21 TYPE 2 DIABETES MELLITUS WITH DIABETIC NEPHROPATHY, WITHOUT LONG-TERM CURRENT USE OF INSULIN (HCC): ICD-10-CM

## 2018-06-05 DIAGNOSIS — Z72.0 TOBACCO ABUSE: ICD-10-CM

## 2018-06-05 NOTE — PROGRESS NOTES
Leopoldo Lee is a 81 y.o. female is here for routine f/u. No CV sx or complaints. Continues to see PCP--DM, BP. Working daily at [employer]. Still smoking. The patient denies chest pain/ shortness of breath, orthopnea, PND, LE edema, palpitations, syncope, presyncope or fatigue.        Patient Active Problem List    Diagnosis Date Noted    S/P cardiac pacemaker procedure 04/06/2017    Second degree AV block 04/05/2017    Tobacco abuse 04/05/2017    Diplopia 04/05/2017    Benign essential HTN 06/14/2016    Vitamin D deficiency 06/14/2016    Fe deficiency anemia 06/14/2016    Proteinuria     Diabetes ([address]       Jeanette Bowden PA-C  Past Medical History:   Diagnosis Date    Diabetes ([address]     Diplopia 4/5/2017    Hypertension     Mobitz (type) II atrioventricular block 4/5/2017    Proteinuria     S/P cardiac pacemaker procedure 4/6/2017 4/6/17 Medtronic dual chamber pacemaker implant    Tobacco abuse 4/5/2017      Past Surgical History:   Procedure Laterality Date    HX APPENDECTOMY      HX CHOLECYSTECTOMY      HX COLONOSCOPY  2014    nl, q 2-3 y    HX GYN      hysterectomy    HX ORTHOPAEDIC  1979    back    HX PACEMAKER  04/06/2017    pacemaker placed on the left side     Allergies   Allergen Reactions    Codeine Hives and Shortness of Breath     Throat closes shut    Flowers Anaphylaxis     roses    Keflex [Cephalexin] Hives and Shortness of Breath     Throat closes shut    Pcn [Penicillins] Hives and Shortness of Breath     Throat closes shut    Metformin Diarrhea    Ciprofloxacin Shortness of Breath    Morphine Angioedema     Throat swells    Adhesive Tape-Silicones Rash      Family History   Problem Relation Age of Onset    COPD Mother     Asthma Mother     Heart Disease Father     Cancer Maternal Grandmother     Stroke Maternal Grandmother     Cancer Maternal Grandfather     Cancer Paternal Grandmother     Heart Disease Paternal Grandfather Social History     Social History    Marital status: SINGLE     Spouse name: N/A    Number of children: N/A    Years of education: N/A     Occupational History    Not on file. Social History Main Topics    Smoking status: Current Every Day Smoker     Packs/day: 0.50     Types: Cigarettes    Smokeless tobacco: Never Used    Alcohol use Yes      Comment: rare    Drug use: No    Sexual activity: Not Currently     Partners: Male     Other Topics Concern    Not on file     Social History Narrative      Current Outpatient Prescriptions   Medication Sig    clotrimazole (LOTRIMIN) 1 % topical cream APPLY TO AFFECTED AREA TWO TIMES A DAY AS NEEDED    losartan (COZAAR) 50 mg tablet Take 1 Tab by mouth daily.  SITagliptin (JANUVIA) 100 mg tablet TAKE 1 TABLET EVERY DAY    glipiZIDE (GLUCOTROL) 10 mg tablet TAKE 1 TABLET TWICE DAILY    dulaglutide (TRULICITY) 3.08 AL/2.4 mL sub-q pen INJECT 1 SYRINGE EVERY 7 DAYS AS DIRECTED    TRUE METRIX GLUCOSE TEST STRIP strip TEST THREE TIMES DAILY    TRUEPLUS LANCETS 33 gauge misc TEST THREE TIMES DAILY    TRUE METRIX AIR GLUCOSE METER monitoring kit USE AS DIRECTED    Lancets misc TID testing.  vit X-T-A7-E-omega-3-ala-dha 250-3-50 unit,mg,unit chew Take 1 Tab by mouth daily.  cholecalciferol (VITAMIN D3) 1,000 unit cap Take 1,000 Units by mouth daily.  ferrous sulfate 325 mg (65 mg iron) tablet Take  by mouth Daily (before breakfast).  vitamin E (AQUA GEMS) 400 unit capsule Take 400 Units by mouth daily.  ascorbic acid (VITAMIN C) 500 mg tablet Take 500 mg by mouth daily.  multivitamin (ONE A DAY) tablet Take 1 Tab by mouth daily.  cyanocobalamin (VITAMIN B-12) 1,000 mcg tablet Take 1,000 mcg by mouth daily. No current facility-administered medications for this visit. Review of Symptoms:    CONST  No weight change.  No fever, chills, sweats    ENT No visual changes, URI sx, sore throat    CV  See HPI   RESP  No cough, or sputum, wheezing. Also see HPI   GI  No abdominal pain or change in bowel habits. No heartburn or dysphagia. No melena or rectal bleeding.   No dysuria, urgency, frequency, hematuria   MSKEL  No joint pain, swelling. No muscle pain. SKIN  No rash or lesions. NEURO  No headache, syncope, or seizure. No weakness, loss of sensation, or paresthesias. PSYCH  No low mood or depression  No anxiety. HE/LYMPH  No easy bruising, abnormal bleeding, or enlarged glands.         Physical ExamPhysical Exam:    Visit Vitals    /90 (BP 1 Location: Right arm, BP Patient Position: Sitting)    Pulse 80    Resp 18    Ht 5' 3\" (1.6 m)    Wt 174 lb (78.9 kg)    SpO2 100%    BMI 30.82 kg/m2     Gen: NAD  HEENT:  PERRL, throat clear  Neck: no adenopathy, no thyromegaly, no JVD   Heart:  Regular,Nl S1S2,  no murmur, gallop or rub.   Lungs:  clear  Abdomen:   Soft, non-tender, bowel sounds are active.   Extremities:  No edema  Pulse: symmetric  Neuro: A&O times 3, No focal neuro deficits    Cardiographics    ECG: A sensing, V pacing    Labs:   Lab Results   Component Value Date/Time    Sodium 142 05/30/2018 08:40 AM    Sodium 142 08/17/2017 10:53 AM    Sodium 143 04/04/2017 07:00 PM    Sodium 140 04/04/2017 03:36 PM    Sodium 144 06/14/2016 08:38 AM    Potassium 4.5 05/30/2018 08:40 AM    Potassium 4.8 08/17/2017 10:53 AM    Potassium 4.5 04/04/2017 07:00 PM    Potassium 4.7 04/04/2017 03:36 PM    Potassium 5.7 (H) 06/14/2016 08:38 AM    Chloride 103 05/30/2018 08:40 AM    Chloride 105 08/17/2017 10:53 AM    Chloride 105 04/04/2017 07:00 PM    Chloride 102 04/04/2017 03:36 PM    Chloride 104 06/14/2016 08:38 AM    CO2 26 05/30/2018 08:40 AM    CO2 24 08/17/2017 10:53 AM    CO2 29 04/04/2017 07:00 PM    CO2 22 04/04/2017 03:36 PM    CO2 25 06/14/2016 08:38 AM    Anion gap 9 04/04/2017 07:00 PM    Glucose 150 (H) 05/30/2018 08:40 AM    Glucose 78 08/17/2017 10:53 AM    Glucose 285 (H) 04/04/2017 07:00 PM Glucose 373 (H) 04/04/2017 03:36 PM    Glucose 123 (H) 06/14/2016 08:38 AM    BUN 18 05/30/2018 08:40 AM    BUN 20 08/17/2017 10:53 AM    BUN 25 (H) 04/04/2017 07:00 PM    BUN 24 04/04/2017 03:36 PM    BUN 12 06/14/2016 08:38 AM    Creatinine 0.87 05/30/2018 08:40 AM    Creatinine 0.90 08/17/2017 10:53 AM    Creatinine 0.93 04/04/2017 07:00 PM    Creatinine 0.83 04/04/2017 03:36 PM    Creatinine 0.74 06/14/2016 08:38 AM    BUN/Creatinine ratio 21 05/30/2018 08:40 AM    BUN/Creatinine ratio 22 08/17/2017 10:53 AM    BUN/Creatinine ratio 27 (H) 04/04/2017 07:00 PM    BUN/Creatinine ratio 29 (H) 04/04/2017 03:36 PM    BUN/Creatinine ratio 16 06/14/2016 08:38 AM    GFR est AA 78 05/30/2018 08:40 AM    GFR est AA 75 08/17/2017 10:53 AM    GFR est AA >60 04/04/2017 07:00 PM    GFR est AA 83 04/04/2017 03:36 PM    GFR est AA 96 06/14/2016 08:38 AM    GFR est non-AA 68 05/30/2018 08:40 AM    GFR est non-AA 65 08/17/2017 10:53 AM    GFR est non-AA 60 (L) 04/04/2017 07:00 PM    GFR est non-AA 72 04/04/2017 03:36 PM    GFR est non-AA 84 06/14/2016 08:38 AM    Calcium 10.2 05/30/2018 08:40 AM    Calcium 10.0 08/17/2017 10:53 AM    Calcium 9.4 04/04/2017 07:00 PM    Calcium 9.6 04/04/2017 03:36 PM    Calcium 10.0 06/14/2016 08:38 AM    Bilirubin, total 0.3 05/30/2018 08:40 AM    Bilirubin, total 0.3 08/17/2017 10:53 AM    Bilirubin, total 0.2 04/04/2017 07:00 PM    Bilirubin, total <0.2 04/04/2017 03:36 PM    Bilirubin, total 0.3 06/14/2016 08:38 AM    AST (SGOT) 19 05/30/2018 08:40 AM    AST (SGOT) 14 08/17/2017 10:53 AM    AST (SGOT) 11 (L) 04/04/2017 07:00 PM    AST (SGOT) 8 04/04/2017 03:36 PM    AST (SGOT) 13 06/14/2016 08:38 AM    Alk. phosphatase 69 05/30/2018 08:40 AM    Alk. phosphatase 65 08/17/2017 10:53 AM    Alk. phosphatase 87 04/04/2017 07:00 PM    Alk. phosphatase 90 04/04/2017 03:36 PM    Alk.  phosphatase 65 06/14/2016 08:38 AM    Protein, total 7.0 05/30/2018 08:40 AM    Protein, total 6.5 08/17/2017 10:53 AM Protein, total 6.7 04/04/2017 07:00 PM    Protein, total 6.4 04/04/2017 03:36 PM    Protein, total 6.8 06/14/2016 08:38 AM    Albumin 4.3 05/30/2018 08:40 AM    Albumin 3.9 08/17/2017 10:53 AM    Albumin 3.3 (L) 04/04/2017 07:00 PM    Albumin 3.6 04/04/2017 03:36 PM    Albumin 4.4 06/14/2016 08:38 AM    Globulin 3.4 04/04/2017 07:00 PM    A-G Ratio 1.6 05/30/2018 08:40 AM    A-G Ratio 1.5 08/17/2017 10:53 AM    A-G Ratio 1.0 (L) 04/04/2017 07:00 PM    A-G Ratio 1.3 04/04/2017 03:36 PM    A-G Ratio 1.8 06/14/2016 08:38 AM    ALT (SGPT) 17 05/30/2018 08:40 AM    ALT (SGPT) 14 08/17/2017 10:53 AM    ALT (SGPT) 15 04/04/2017 07:00 PM    ALT (SGPT) 9 04/04/2017 03:36 PM    ALT (SGPT) 12 06/14/2016 08:38 AM     Lab Results   Component Value Date/Time    CK 36 04/04/2017 07:00 PM     Lab Results   Component Value Date/Time    Cholesterol, total 175 05/30/2018 08:40 AM    Cholesterol, total 156 08/17/2017 10:53 AM    Cholesterol, total 144 06/14/2016 08:38 AM    Cholesterol, total 170 06/29/2015 07:46 AM    HDL Cholesterol 49 05/30/2018 08:40 AM    HDL Cholesterol 62 08/17/2017 10:53 AM    HDL Cholesterol 42 06/14/2016 08:38 AM    HDL Cholesterol 43 06/29/2015 07:46 AM    LDL, calculated 100 (H) 05/30/2018 08:40 AM    LDL, calculated 80 08/17/2017 10:53 AM    LDL, calculated 79 06/14/2016 08:38 AM    LDL, calculated 102 (H) 06/29/2015 07:46 AM    Triglyceride 128 05/30/2018 08:40 AM    Triglyceride 72 08/17/2017 10:53 AM    Triglyceride 115 06/14/2016 08:38 AM    Triglyceride 127 06/29/2015 07:46 AM     No results found for this or any previous visit.     Assessment:         Patient Active Problem List    Diagnosis Date Noted    S/P cardiac pacemaker procedure 04/06/2017    Second degree AV block 04/05/2017    Tobacco abuse 04/05/2017    Diplopia 04/05/2017    Benign essential HTN 06/14/2016    Vitamin D deficiency 06/14/2016    Fe deficiency anemia 06/14/2016    Proteinuria     Diabetes ([facility] [address]      No CV sx or complaints. Continues to see PCP--DM, BP. Working daily at [employer]. Still smoking. Plan:     Doing well with no adverse cardiac symptoms. Lipids and labs followed by PCP. Continue current care and f/u in 6 months.   PPM checks per Dr Jesse Brown MD

## 2018-06-05 NOTE — PROGRESS NOTES
Verified patient with two patient identifiers. Medications reviewed/approved by Dr. Jagdish Wong. Verbal from Dr. Jagdish Wong to remove the medications that were deleted during the visit. Chief Complaint   Patient presents with    Hypertension     6 month follow up     1. Have you been to the ER, urgent care clinic since your last visit? Hospitalized since your last visit? no    2. Have you seen or consulted any other health care providers outside of the [address] [place] [address] since your last visit? Include any pap smears or colon screening.  No

## 2018-06-05 NOTE — MR AVS SNAPSHOT
[address] [facility] [address] 
 
 
 [address] [facility] [address] [id number] [phone] Patient: Isiah Palmer MRN: [id number] Visit Information Date & Time Provider Department Dept. Phone Encounter #  
 6/5/2018  2:40 PM Elizabeth Avalos, [address] [facility] Cardiology [facility] [phone] Your Appointments 9/6/2018  9:30 AM  
Any with India Thacker PA-C  
[facility] ([facility]) Appt Note: 6 mo f/u,CP$15/ km  
 [address]  
[phone]  
  
   
 [address]  
  
    
 12/11/2018  9:00 AM  
ESTABLISHED PATIENT with Elizabeth Avalos MD  
[address] [facility]) Appt Note: $45CP 6/5/18ksr / 6 month follow up  
 [address] [place] [id number] [phone]  
  
   
 [address] [place] [address] Upcoming Health Maintenance Date Due  
 GLAUCOMA SCREENING Q2Y 6/20/2012 HEMOGLOBIN A1C Q6M 2/17/2018 FOOT EXAM Q1 5/10/2018 Influenza Age 5 to Adult 8/1/2018 MEDICARE YEARLY EXAM 8/18/2018 BREAST CANCER SCRN MAMMOGRAM 1/4/2019 MICROALBUMIN Q1 5/30/2019 LIPID PANEL Q1 5/30/2019 DTaP/Tdap/Td series (2 - Td) 1/4/2027 COLONOSCOPY 1/4/2027 Allergies as of 6/5/2018  Review Complete On: 6/5/2018 By: Elizabeth Avalos MD  
  
 Severity Noted Reaction Type Reactions Codeine High 06/07/2014    Hives, Shortness of Breath Throat closes shut Flowers High 04/05/2017    Anaphylaxis  
 roses Keflex [Cephalexin] High 06/07/2014    Hives, Shortness of Breath Throat closes shut Pcn [Penicillins] High 06/07/2014    Hives, Shortness of Breath Throat closes shut Metformin Medium 05/05/2017    Diarrhea Ciprofloxacin  06/18/2016    Shortness of Breath Morphine  01/04/2017    Angioedema Throat swells Adhesive Tape-silicones Low 36/71/1717    Rash Current Immunizations  Never Reviewed No immunizations on file. Not reviewed this visit You Were Diagnosed With   
  
 Codes Comments Second degree AV block    -  Primary ICD-10-CM: I44.1 ICD-9-CM: 426.13 S/P cardiac pacemaker procedure     ICD-10-CM: Z95.0 ICD-9-CM: V45.01 Benign essential HTN     ICD-10-CM: I10 
ICD-9-CM: 401.1 Type 2 diabetes mellitus with diabetic nephropathy, without long-term current use of insulin (HCC)     ICD-10-CM: E11.21 
ICD-9-CM: 250.40, 583.81 Tobacco abuse     ICD-10-CM: Z72.0 ICD-9-CM: 305.1 Vitals BP Pulse Resp Height(growth percentile) Weight(growth percentile) SpO2  
 140/90 (BP 1 Location: Right arm, BP Patient Position: Sitting) 80 18 5' 3\" (1.6 m) 174 lb (78.9 kg) 100% BMI OB Status Smoking Status 30.82 kg/m2 Hysterectomy Current Every Day Smoker Vitals History BMI and BSA Data Body Mass Index Body Surface Area  
 30.82 kg/m 2 1.87 m 2 Preferred Pharmacy Pharmacy Name Phone [address] [phone] Your Updated Medication List  
  
   
This list is accurate as of 6/5/18  2:49 PM.  Always use your most recent med list.  
  
  
  
  
 ascorbic acid (vitamin C) 500 mg tablet Commonly known as:  VITAMIN C Take 500 mg by mouth daily. cholecalciferol 1,000 unit Cap Commonly known as:  VITAMIN D3 Take 1,000 Units by mouth daily. clotrimazole 1 % topical cream  
Commonly known as:  LOTRIMIN  
APPLY TO AFFECTED AREA TWO TIMES A DAY AS NEEDED  
  
 dulaglutide 0.75 mg/0.5 mL sub-q pen Commonly known as:  TRULICITY INJECT 1 SYRINGE EVERY 7 DAYS AS DIRECTED  
  
 ferrous sulfate 325 mg (65 mg iron) tablet Take  by mouth Daily (before breakfast). glipiZIDE 10 mg tablet Commonly known as:  GLUCOTROL  
TAKE 1 TABLET TWICE DAILY * Lancets Misc TID testing. * TRUEPLUS LANCETS 33 gauge Misc Generic drug:  lancets TEST THREE TIMES DAILY losartan 50 mg tablet Commonly known as:  COZAAR Take 1 Tab by mouth daily. multivitamin tablet Commonly known as:  ONE A DAY Take 1 Tab by mouth daily. SITagliptin 100 mg tablet Commonly known as:  Cathaleen Lint TAKE 1 TABLET EVERY DAY  
  
 TRUE METRIX AIR GLUCOSE METER monitoring kit Generic drug:  Blood-Glucose Meter USE AS DIRECTED  
  
 TRUE METRIX GLUCOSE TEST STRIP strip Generic drug:  glucose blood VI test strips TEST THREE TIMES DAILY  
  
 vit B-U-Z9-E-omega-3-ala-dha 250-3-50 unit,mg,unit Aníbal Kline Take 1 Tab by mouth daily. VITAMIN B-12 1,000 mcg tablet Generic drug:  cyanocobalamin Take 1,000 mcg by mouth daily. vitamin E 400 unit capsule Commonly known as:  [address] Take 400 Units by mouth daily. * Notice: This list has 2 medication(s) that are the same as other medications prescribed for you. Read the directions carefully, and ask your doctor or other care provider to review them with you. We Performed the Following AMB POC EKG ROUTINE W/ 12 LEADS, INTER & REP [99672 CPT(R)] Introducing [facility]! Brielle Nuñez introduces [employer] patient portal. Now you can access parts of your medical record, email your doctor's office, and request medication refills online. 1. In your internet browser, go to [email or web address] 2. Click on the First Time User? Click Here link in the Sign In box. You will see the New Member Sign Up page. 3. Enter your [employer] Access Code exactly as it appears below. You will not need to use this code after youve completed the sign-up process. If you do not sign up before the expiration date, you must request a new code. · [employer] Access Code: [email or web address] Expires: 6/18/2018 10:33 AM 
 
4.  Enter the last four digits of your Social Security Number (xxxx) and Date of Birth (mm/dd/yyyy) as indicated and click Submit. You will be taken to the next sign-up page. 5. Create a [facility] ID. This will be your [employer] login ID and cannot be changed, so think of one that is secure and easy to remember. 6. Create a [employer] password. You can change your password at any time. 7. Enter your Password Reset Question and Answer. This can be used at a later time if you forget your password. 8. Enter your e-mail address. You will receive e-mail notification when new information is available in [address] 9. Click Sign Up. You can now view and download portions of your medical record. 10. Click the Download Summary menu link to download a portable copy of your medical information. If you have questions, please visit the Frequently Asked Questions section of the [employer] website. Remember, [facility] is NOT to be used for urgent needs. For medical emergencies, dial 911. Now available from your iPhone and Android! Please provide this summary of care documentation to your next provider. Your primary care clinician is listed as Grady Villarreal. If you have any questions after today's visit, please call [phone].

## 2018-06-06 LAB
BASOPHILS # BLD AUTO: 0 X10E3/UL (ref 0–0.2)
BASOPHILS NFR BLD AUTO: 0 %
EOSINOPHIL # BLD AUTO: 0.1 X10E3/UL (ref 0–0.4)
EOSINOPHIL NFR BLD AUTO: 2 %
ERYTHROCYTE [DISTWIDTH] IN BLOOD BY AUTOMATED COUNT: 13.6 % (ref 12.3–15.4)
HBA1C MFR BLD: 6.8 % (ref 4.8–5.6)
HCT VFR BLD AUTO: 35.6 % (ref 34–46.6)
HGB BLD-MCNC: 12 G/DL (ref 11.1–15.9)
IMM GRANULOCYTES # BLD: 0 X10E3/UL (ref 0–0.1)
IMM GRANULOCYTES NFR BLD: 1 %
LYMPHOCYTES # BLD AUTO: 2 X10E3/UL (ref 0.7–3.1)
LYMPHOCYTES NFR BLD AUTO: 32 %
MCH RBC QN AUTO: 29.4 PG (ref 26.6–33)
MCHC RBC AUTO-ENTMCNC: 33.7 G/DL (ref 31.5–35.7)
MCV RBC AUTO: 87 FL (ref 79–97)
MONOCYTES # BLD AUTO: 0.6 X10E3/UL (ref 0.1–0.9)
MONOCYTES NFR BLD AUTO: 9 %
NEUTROPHILS # BLD AUTO: 3.6 X10E3/UL (ref 1.4–7)
NEUTROPHILS NFR BLD AUTO: 56 %
PLATELET # BLD AUTO: 220 X10E3/UL (ref 150–379)
RBC # BLD AUTO: 4.08 X10E6/UL (ref 3.77–5.28)
SPECIMEN STATUS REPORT, ROLRST: NORMAL
WBC # BLD AUTO: 6.4 X10E3/UL (ref 3.4–10.8)

## 2018-06-06 NOTE — PROGRESS NOTES
In addnl to previous notes-- notify pt cbc is wnl and A1c is okay, a little higher than previous but still <7 which is good. Cont to work on diet/ex. Take medications as directed. F/u in 6 mo, sooner prn or for BP >140/90.

## 2018-07-06 DIAGNOSIS — B35.1 ONYCHOMYCOSIS: ICD-10-CM

## 2018-07-06 DIAGNOSIS — B35.3 TINEA PEDIS OF BOTH FEET: ICD-10-CM

## 2018-07-08 RX ORDER — CHLORPHENIRAMINE MALEATE 4 MG
TABLET ORAL
Qty: 45 G | Refills: 1 | Status: SHIPPED | OUTPATIENT
Start: 2018-07-08 | End: 2018-09-04 | Stop reason: SDUPTHER

## 2018-09-11 ENCOUNTER — TELEPHONE (OUTPATIENT)
Dept: FAMILY MEDICINE CLINIC | Age: 73
End: 2018-09-11

## 2018-09-11 NOTE — TELEPHONE ENCOUNTER
Patient would like Santa to call her back with her labs results. Please leave a message on the machine with the information.

## 2019-03-22 ENCOUNTER — OFFICE VISIT (OUTPATIENT)
Dept: CARDIOLOGY CLINIC | Age: 74
End: 2019-03-22

## 2019-03-22 VITALS
DIASTOLIC BLOOD PRESSURE: 80 MMHG | BODY MASS INDEX: 30.48 KG/M2 | HEIGHT: 63 IN | OXYGEN SATURATION: 96 % | RESPIRATION RATE: 18 BRPM | SYSTOLIC BLOOD PRESSURE: 140 MMHG | WEIGHT: 172 LBS | HEART RATE: 84 BPM

## 2019-03-22 DIAGNOSIS — Z95.0 S/P CARDIAC PACEMAKER PROCEDURE: ICD-10-CM

## 2019-03-22 DIAGNOSIS — R00.1 BRADYCARDIA: Primary | ICD-10-CM

## 2019-03-22 DIAGNOSIS — Z72.0 TOBACCO ABUSE: ICD-10-CM

## 2019-03-22 DIAGNOSIS — I44.1 SECOND DEGREE AV BLOCK: ICD-10-CM

## 2019-03-22 DIAGNOSIS — I10 BENIGN ESSENTIAL HTN: ICD-10-CM

## 2019-03-22 DIAGNOSIS — E11.21 TYPE 2 DIABETES MELLITUS WITH DIABETIC NEPHROPATHY, WITHOUT LONG-TERM CURRENT USE OF INSULIN (HCC): ICD-10-CM

## 2019-03-22 NOTE — PROGRESS NOTES
Verified patient with two patient identifiers. Medications reviewed/approved by Dr. Chris Vigil. A verbal from Dr. Chris Vigil was given to remove any medications that were deleted during the visit. Medication(s) removed: none    Chief Complaint   Patient presents with    Slow Heart Rate     6 month follow up    Hypertension     1. Have you been to the ER, urgent care clinic since your last visit? Hospitalized since your last visit? No    2. Have you seen or consulted any other health care providers outside of the [address] [place] [address] since your last visit? Include any pap smears or colon screening.  no

## 2019-03-22 NOTE — PROGRESS NOTES
Connie Morrison is a 72 y.o. female is here for routine f/u. Hx nahomi/AV block, s/p PPM, DM, hypertension, tobacco (still smoking). No CV sx or complaints. The patient denies chest pain/ shortness of breath, orthopnea, PND, LE edema, palpitations, syncope, presyncope or fatigue.        Patient Active Problem List    Diagnosis Date Noted    S/P cardiac pacemaker procedure 04/06/2017    Second degree AV block 04/05/2017    Tobacco abuse 04/05/2017    Diplopia 04/05/2017    Benign essential HTN 06/14/2016    Vitamin D deficiency 06/14/2016    Fe deficiency anemia 06/14/2016    Proteinuria     Diabetes ([facility] [address]       Donna Miller PA-C  Past Medical History:   Diagnosis Date    Diabetes ([facility] [address]     Diplopia 4/5/2017    Hypertension     Mobitz (type) II atrioventricular block 4/5/2017    Proteinuria     S/P cardiac pacemaker procedure 4/6/2017 4/6/17 Medtronic dual chamber pacemaker implant    Tobacco abuse 4/5/2017      Past Surgical History:   Procedure Laterality Date    HX APPENDECTOMY      HX CHOLECYSTECTOMY      HX COLONOSCOPY  2014    nl, q 2-3 y    HX GYN      hysterectomy    HX ORTHOPAEDIC  1979    back    HX PACEMAKER  04/06/2017    pacemaker placed on the left side     Allergies   Allergen Reactions    Codeine Hives and Shortness of Breath     Throat closes shut    Flowers Anaphylaxis     roses    Keflex [Cephalexin] Hives and Shortness of Breath     Throat closes shut    Pcn [Penicillins] Hives and Shortness of Breath     Throat closes shut    Metformin Diarrhea    Ciprofloxacin Shortness of Breath    Morphine Angioedema     Throat swells    Adhesive Tape-Silicones Rash      Family History   Problem Relation Age of Onset    COPD Mother     Asthma Mother     Heart Disease Father     Cancer Maternal Grandmother     Stroke Maternal Grandmother     Cancer Maternal Grandfather     Cancer Paternal Grandmother     Heart Disease Paternal Grandfather       Social History     Socioeconomic History    Marital status: SINGLE     Spouse name: Not on file    Number of children: Not on file    Years of education: Not on file    Highest education level: Not on file   Occupational History    Not on file   Social Needs    Financial resource strain: Not on file    Food insecurity:     Worry: Not on file     Inability: Not on file    Transportation needs:     Medical: Not on file     Non-medical: Not on file   Tobacco Use    Smoking status: Current Every Day Smoker     Packs/day: 0.50     Types: Cigarettes    Smokeless tobacco: Never Used   Substance and Sexual Activity    Alcohol use: Yes     Comment: rare    Drug use: No    Sexual activity: Not Currently     Partners: Male   Lifestyle    Physical activity:     Days per week: Not on file     Minutes per session: Not on file    Stress: Not on file   Relationships    Social connections:     Talks on phone: Not on file     Gets together: Not on file     Attends Anabaptist service: Not on file     Active member of club or organization: Not on file     Attends meetings of clubs or organizations: Not on file     Relationship status: Not on file    Intimate partner violence:     Fear of current or ex partner: Not on file     Emotionally abused: Not on file     Physically abused: Not on file     Forced sexual activity: Not on file   Other Topics Concern    Not on file   Social History Narrative    Not on file      Current Outpatient Medications   Medication Sig    losartan (COZAAR) 50 mg tablet TAKE 1 TABLET EVERY DAY    glucose blood VI test strips (TRUE METRIX GLUCOSE TEST STRIP) strip TEST THREE TIMES DAILY    lancets (TRUEPLUS LANCETS) 33 gauge misc TEST THREE TIMES DAILY    glipiZIDE (GLUCOTROL) 10 mg tablet TAKE 1 TABLET TWICE DAILY    dulaglutide (TRULICITY) 5.19 KB/9.8 mL sub-q pen INJECT 1 SYRINGE EVERY 7 DAYS AS DIRECTED    clotrimazole (LOTRIMIN) 1 % topical cream APPLY TO AFFECTED AREA TWO TIMES A DAY AS NEEDED TOPICALLY    TRUE METRIX AIR GLUCOSE METER monitoring kit USE AS DIRECTED    vit N-L-Z0-E-omega-3-ala-dha 250-3-50 unit,mg,unit chew Take 1 Tab by mouth daily.  cholecalciferol (VITAMIN D3) 1,000 unit cap Take 1,000 Units by mouth daily.  vitamin E (AQUA GEMS) 400 unit capsule Take 400 Units by mouth daily.  ascorbic acid (VITAMIN C) 500 mg tablet Take 500 mg by mouth daily.  multivitamin (ONE A DAY) tablet Take 1 Tab by mouth daily.  cyanocobalamin (VITAMIN B-12) 1,000 mcg tablet Take 1,000 mcg by mouth daily.  SITagliptin (JANUVIA) 100 mg tablet TAKE 1 TABLET EVERY DAY    ferrous sulfate 325 mg (65 mg iron) tablet Take  by mouth Daily (before breakfast). No current facility-administered medications for this visit. Review of Symptoms:    CONST  No weight change. No fever, chills, sweats    ENT No visual changes, URI sx, sore throat    CV  See HPI   RESP  No cough, or sputum, wheezing. Also see HPI   GI  No abdominal pain or change in bowel habits. No heartburn or dysphagia. No melena or rectal bleeding.   No dysuria, urgency, frequency, hematuria   MSKEL  No joint pain, swelling. No muscle pain. SKIN  No rash or lesions. NEURO  No headache, syncope, or seizure. No weakness, loss of sensation, or paresthesias. PSYCH  No low mood or depression  No anxiety. HE/LYMPH  No easy bruising, abnormal bleeding, or enlarged glands.         Physical ExamPhysical Exam:    Visit Vitals  /80 (BP 1 Location: Right arm, BP Patient Position: Sitting)   Pulse 84   Resp 18   Ht 5' 3\" (1.6 m)   Wt 172 lb (78 kg)   SpO2 96% Comment: ra   BMI 30.47 kg/m²     Gen: NAD  HEENT:  PERRL, throat clear  Neck: no adenopathy, no thyromegaly, no JVD   Heart:  Regular,Nl S1S2,  no murmur, gallop or rub.   Lungs:  clear  Abdomen:   Soft, non-tender, bowel sounds are active.   Extremities:  No edema  Pulse: symmetric  Neuro: A&O times 3, No focal neuro deficits    Cardiographics    ECG: V paced      Labs:   Lab Results   Component Value Date/Time    Sodium 142 05/30/2018 08:40 AM    Sodium 142 08/17/2017 10:53 AM    Sodium 143 04/04/2017 07:00 PM    Sodium 140 04/04/2017 03:36 PM    Sodium 144 06/14/2016 08:38 AM    Potassium 4.5 05/30/2018 08:40 AM    Potassium 4.8 08/17/2017 10:53 AM    Potassium 4.5 04/04/2017 07:00 PM    Potassium 4.7 04/04/2017 03:36 PM    Potassium 5.7 (H) 06/14/2016 08:38 AM    Chloride 103 05/30/2018 08:40 AM    Chloride 105 08/17/2017 10:53 AM    Chloride 105 04/04/2017 07:00 PM    Chloride 102 04/04/2017 03:36 PM    Chloride 104 06/14/2016 08:38 AM    CO2 26 05/30/2018 08:40 AM    CO2 24 08/17/2017 10:53 AM    CO2 29 04/04/2017 07:00 PM    CO2 22 04/04/2017 03:36 PM    CO2 25 06/14/2016 08:38 AM    Anion gap 9 04/04/2017 07:00 PM    Glucose 150 (H) 05/30/2018 08:40 AM    Glucose 78 08/17/2017 10:53 AM    Glucose 285 (H) 04/04/2017 07:00 PM    Glucose 373 (H) 04/04/2017 03:36 PM    Glucose 123 (H) 06/14/2016 08:38 AM    BUN 18 05/30/2018 08:40 AM    BUN 20 08/17/2017 10:53 AM    BUN 25 (H) 04/04/2017 07:00 PM    BUN 24 04/04/2017 03:36 PM    BUN 12 06/14/2016 08:38 AM    Creatinine 0.87 05/30/2018 08:40 AM    Creatinine 0.90 08/17/2017 10:53 AM    Creatinine 0.93 04/04/2017 07:00 PM    Creatinine 0.83 04/04/2017 03:36 PM    Creatinine 0.74 06/14/2016 08:38 AM    BUN/Creatinine ratio 21 05/30/2018 08:40 AM    BUN/Creatinine ratio 22 08/17/2017 10:53 AM    BUN/Creatinine ratio 27 (H) 04/04/2017 07:00 PM    BUN/Creatinine ratio 29 (H) 04/04/2017 03:36 PM    BUN/Creatinine ratio 16 06/14/2016 08:38 AM    GFR est AA 78 05/30/2018 08:40 AM    GFR est AA 75 08/17/2017 10:53 AM    GFR est AA >60 04/04/2017 07:00 PM    GFR est AA 83 04/04/2017 03:36 PM    GFR est AA 96 06/14/2016 08:38 AM    GFR est non-AA 68 05/30/2018 08:40 AM    GFR est non-AA 65 08/17/2017 10:53 AM    GFR est non-AA 60 (L) 04/04/2017 07:00 PM    GFR est non-AA 72 04/04/2017 03:36 PM    GFR est non-AA 84 06/14/2016 08:38 AM    Calcium 10.2 05/30/2018 08:40 AM    Calcium 10.0 08/17/2017 10:53 AM    Calcium 9.4 04/04/2017 07:00 PM    Calcium 9.6 04/04/2017 03:36 PM    Calcium 10.0 06/14/2016 08:38 AM    Bilirubin, total 0.3 05/30/2018 08:40 AM    Bilirubin, total 0.3 08/17/2017 10:53 AM    Bilirubin, total 0.2 04/04/2017 07:00 PM    Bilirubin, total <0.2 04/04/2017 03:36 PM    Bilirubin, total 0.3 06/14/2016 08:38 AM    AST (SGOT) 19 05/30/2018 08:40 AM    AST (SGOT) 14 08/17/2017 10:53 AM    AST (SGOT) 11 (L) 04/04/2017 07:00 PM    AST (SGOT) 8 04/04/2017 03:36 PM    AST (SGOT) 13 06/14/2016 08:38 AM    Alk. phosphatase 69 05/30/2018 08:40 AM    Alk. phosphatase 65 08/17/2017 10:53 AM    Alk. phosphatase 87 04/04/2017 07:00 PM    Alk. phosphatase 90 04/04/2017 03:36 PM    Alk.  phosphatase 65 06/14/2016 08:38 AM    Protein, total 7.0 05/30/2018 08:40 AM    Protein, total 6.5 08/17/2017 10:53 AM    Protein, total 6.7 04/04/2017 07:00 PM    Protein, total 6.4 04/04/2017 03:36 PM    Protein, total 6.8 06/14/2016 08:38 AM    Albumin 4.3 05/30/2018 08:40 AM    Albumin 3.9 08/17/2017 10:53 AM    Albumin 3.3 (L) 04/04/2017 07:00 PM    Albumin 3.6 04/04/2017 03:36 PM    Albumin 4.4 06/14/2016 08:38 AM    Globulin 3.4 04/04/2017 07:00 PM    A-G Ratio 1.6 05/30/2018 08:40 AM    A-G Ratio 1.5 08/17/2017 10:53 AM    A-G Ratio 1.0 (L) 04/04/2017 07:00 PM    A-G Ratio 1.3 04/04/2017 03:36 PM    A-G Ratio 1.8 06/14/2016 08:38 AM    ALT (SGPT) 17 05/30/2018 08:40 AM    ALT (SGPT) 14 08/17/2017 10:53 AM    ALT (SGPT) 15 04/04/2017 07:00 PM    ALT (SGPT) 9 04/04/2017 03:36 PM    ALT (SGPT) 12 06/14/2016 08:38 AM     Lab Results   Component Value Date/Time    CK 36 04/04/2017 07:00 PM     Lab Results   Component Value Date/Time    Cholesterol, total 175 05/30/2018 08:40 AM    Cholesterol, total 156 08/17/2017 10:53 AM    Cholesterol, total 144 06/14/2016 08:38 AM    Cholesterol, total 170 06/29/2015 07:46 AM    HDL Cholesterol 49 05/30/2018 08:40 AM    HDL Cholesterol 62 08/17/2017 10:53 AM    HDL Cholesterol 42 06/14/2016 08:38 AM    HDL Cholesterol 43 06/29/2015 07:46 AM    LDL, calculated 100 (H) 05/30/2018 08:40 AM    LDL, calculated 80 08/17/2017 10:53 AM    LDL, calculated 79 06/14/2016 08:38 AM    LDL, calculated 102 (H) 06/29/2015 07:46 AM    Triglyceride 128 05/30/2018 08:40 AM    Triglyceride 72 08/17/2017 10:53 AM    Triglyceride 115 06/14/2016 08:38 AM    Triglyceride 127 06/29/2015 07:46 AM     No results found for this or any previous visit. Assessment:         Patient Active Problem List    Diagnosis Date Noted    S/P cardiac pacemaker procedure 04/06/2017    Second degree AV block 04/05/2017    Tobacco abuse 04/05/2017    Diplopia 04/05/2017    Benign essential HTN 06/14/2016    Vitamin D deficiency 06/14/2016    Fe deficiency anemia 06/14/2016    Proteinuria     Diabetes ([facility] [address]         Plan:     Doing well with no adverse cardiac symptoms. Has not had PPM check, does not want to go to [place]--will have her come in for Medtronic check in June   Lipids and labs followed by PCP--f/u as planned. .  Continue current care and f/u in 6 months.     Cb Palafox MD

## 2019-06-27 ENCOUNTER — CLINICAL SUPPORT (OUTPATIENT)
Dept: CARDIOLOGY CLINIC | Age: 74
End: 2019-06-27

## 2019-06-27 DIAGNOSIS — I44.1 SECOND DEGREE AV BLOCK: ICD-10-CM

## 2019-06-27 DIAGNOSIS — R00.1 BRADYCARDIA: Primary | ICD-10-CM

## 2019-06-27 DIAGNOSIS — Z95.0 S/P CARDIAC PACEMAKER PROCEDURE: Primary | ICD-10-CM

## 2019-06-27 DIAGNOSIS — R00.1 BRADYCARDIA: ICD-10-CM

## 2019-06-27 DIAGNOSIS — Z95.0 S/P CARDIAC PACEMAKER PROCEDURE: ICD-10-CM

## 2019-06-27 NOTE — PROGRESS NOTES
Verified patient with two patient identifiers. Medtronic pacemaker check only. NO complaints per the pt. No LOS.

## 2019-07-03 DIAGNOSIS — I44.1 SECOND DEGREE AV BLOCK: Primary | ICD-10-CM

## 2019-09-10 PROBLEM — E11.21 TYPE 2 DIABETES WITH NEPHROPATHY (HCC): Status: ACTIVE | Noted: 2019-09-10

## 2020-03-13 PROBLEM — E78.00 ELEVATED CHOLESTEROL: Status: ACTIVE | Noted: 2020-03-13

## 2021-03-03 DIAGNOSIS — E11.21 TYPE 2 DIABETES WITH NEPHROPATHY (HCC): ICD-10-CM

## 2021-03-03 NOTE — TELEPHONE ENCOUNTER
Requested Prescriptions     Pending Prescriptions Disp Refills    dulaglutide (Trulicity) 1.5 FG/0.6 mL sub-q pen 4 Syringe 0

## 2021-03-05 RX ORDER — DULAGLUTIDE 1.5 MG/.5ML
INJECTION, SOLUTION SUBCUTANEOUS
Qty: 2 SYRINGE | Refills: 0 | Status: SHIPPED | OUTPATIENT
Start: 2021-03-05 | End: 2021-04-02

## 2021-03-08 ENCOUNTER — OFFICE VISIT (OUTPATIENT)
Dept: FAMILY MEDICINE CLINIC | Age: 76
End: 2021-03-08
Payer: MEDICARE

## 2021-03-08 VITALS
BODY MASS INDEX: 30.83 KG/M2 | RESPIRATION RATE: 18 BRPM | OXYGEN SATURATION: 98 % | TEMPERATURE: 97 F | WEIGHT: 174 LBS | HEART RATE: 68 BPM | HEIGHT: 63 IN | SYSTOLIC BLOOD PRESSURE: 130 MMHG | DIASTOLIC BLOOD PRESSURE: 70 MMHG

## 2021-03-08 DIAGNOSIS — E11.21 TYPE 2 DIABETES WITH NEPHROPATHY (HCC): ICD-10-CM

## 2021-03-08 DIAGNOSIS — E78.00 ELEVATED CHOLESTEROL: ICD-10-CM

## 2021-03-08 DIAGNOSIS — I10 BENIGN ESSENTIAL HTN: Primary | ICD-10-CM

## 2021-03-08 DIAGNOSIS — E55.9 VITAMIN D DEFICIENCY: ICD-10-CM

## 2021-03-08 PROCEDURE — 1101F PT FALLS ASSESS-DOCD LE1/YR: CPT | Performed by: PHYSICIAN ASSISTANT

## 2021-03-08 PROCEDURE — 99214 OFFICE O/P EST MOD 30 MIN: CPT | Performed by: PHYSICIAN ASSISTANT

## 2021-03-08 PROCEDURE — G8752 SYS BP LESS 140: HCPCS | Performed by: PHYSICIAN ASSISTANT

## 2021-03-08 PROCEDURE — 3017F COLORECTAL CA SCREEN DOC REV: CPT | Performed by: PHYSICIAN ASSISTANT

## 2021-03-08 PROCEDURE — G8754 DIAS BP LESS 90: HCPCS | Performed by: PHYSICIAN ASSISTANT

## 2021-03-08 PROCEDURE — 3046F HEMOGLOBIN A1C LEVEL >9.0%: CPT | Performed by: PHYSICIAN ASSISTANT

## 2021-03-08 PROCEDURE — G8400 PT W/DXA NO RESULTS DOC: HCPCS | Performed by: PHYSICIAN ASSISTANT

## 2021-03-08 PROCEDURE — G8432 DEP SCR NOT DOC, RNG: HCPCS | Performed by: PHYSICIAN ASSISTANT

## 2021-03-08 PROCEDURE — 1090F PRES/ABSN URINE INCON ASSESS: CPT | Performed by: PHYSICIAN ASSISTANT

## 2021-03-08 PROCEDURE — 2022F DILAT RTA XM EVC RTNOPTHY: CPT | Performed by: PHYSICIAN ASSISTANT

## 2021-03-08 PROCEDURE — G8417 CALC BMI ABV UP PARAM F/U: HCPCS | Performed by: PHYSICIAN ASSISTANT

## 2021-03-08 PROCEDURE — G8536 NO DOC ELDER MAL SCRN: HCPCS | Performed by: PHYSICIAN ASSISTANT

## 2021-03-08 PROCEDURE — G8427 DOCREV CUR MEDS BY ELIG CLIN: HCPCS | Performed by: PHYSICIAN ASSISTANT

## 2021-03-08 NOTE — PROGRESS NOTES
Barney Kirkland is a 70 y.o. female who presents to the office today with the following:  Chief Complaint   Patient presents with    Follow-up    Diabetes    Hypertension       HPI  Pt reports doing well. Suffered a lot of loss this year, 4 different relatives passed. Initially had difficulty sleeping and was struggling, cryiing a lot. However, says she is already feeling much better and is sleeping well again. Has good support with family. Is staying busy. Denies depression/SI/HI or other psych concerns. Feels she is managing well and physically feels well. T2DM  On several medications. Lab Results   Component Value Date/Time    Hemoglobin A1c 8.4 (H) 07/27/2020 08:44 AM     Had to increase her trulicity. Has diarrhea the following day but otherwise does well. Had coffee with creamer this morning, otherwise fasting. Lab Results   Component Value Date/Time    Cholesterol, total 208 (H) 03/16/2020 08:59 AM    HDL Cholesterol 60 03/16/2020 08:59 AM    LDL, calculated 127 (H) 03/16/2020 08:59 AM    VLDL, calculated 21 03/16/2020 08:59 AM    Triglyceride 103 03/16/2020 08:59 AM   no interest in cholesterol medication, aware of risks and indications. Lab Results   Component Value Date/Time    Sodium 141 07/27/2020 08:44 AM    Potassium 4.8 07/27/2020 08:44 AM    Chloride 101 07/27/2020 08:44 AM    CO2 24 07/27/2020 08:44 AM    Anion gap 9 04/04/2017 07:00 PM    Glucose 224 (H) 07/27/2020 08:44 AM    BUN 26 07/27/2020 08:44 AM    Creatinine 1.09 (H) 07/27/2020 08:44 AM    BUN/Creatinine ratio 24 07/27/2020 08:44 AM    GFR est AA 58 (L) 07/27/2020 08:44 AM    GFR est non-AA 50 (L) 07/27/2020 08:44 AM    Calcium 9.5 07/27/2020 08:44 AM    Bilirubin, total 0.3 03/16/2020 08:59 AM    Alk.  phosphatase 101 03/16/2020 08:59 AM    Protein, total 6.8 03/16/2020 08:59 AM    Albumin 4.1 03/16/2020 08:59 AM    Globulin 3.4 04/04/2017 07:00 PM    A-G Ratio 1.5 03/16/2020 08:59 AM    ALT (SGPT) 15 03/16/2020 08:59 AM    AST (SGOT) 14 03/16/2020 08:59 AM       Pt had anaphylaxis (throat swelling/close) to flu shot so does not get other vaccines. She wants the COVID vaccine so she can travel. Discussed this today, would like recommend if truly anaphylactic reaction in the past and unclear component (ie eggs)    Has not yet been to eye doctor. Will make that apt eventually. No longer gets mammograms. Review of Systems   Constitutional: Negative for chills and fever. Respiratory: Negative. Negative for shortness of breath. Cardiovascular: Negative for chest pain, palpitations and leg swelling. Gastrointestinal: Negative. Genitourinary: Negative. Musculoskeletal: Negative for myalgias. Neurological: Negative. See HPI. Past Medical History:   Diagnosis Date    Diabetes ([facility] [address]     Diplopia 4/5/2017    Hypertension     Mobitz (type) II atrioventricular block 4/5/2017    Proteinuria     S/P cardiac pacemaker procedure 4/6/2017 4/6/17 Medtronic dual chamber pacemaker implant    Tobacco abuse 4/5/2017       Past Surgical History:   Procedure Laterality Date    HX APPENDECTOMY      HX CHOLECYSTECTOMY      HX COLONOSCOPY  2014    nl, q 2-3 y    HX GYN      hysterectomy    HX ORTHOPAEDIC  1979    back    HX PACEMAKER  04/06/2017    pacemaker placed on the left side       Allergies   Allergen Reactions    Codeine Hives and Shortness of Breath     Throat closes shut    Flowers Anaphylaxis     roses    Keflex [Cephalexin] Hives and Shortness of Breath     Throat closes shut    Pcn [Penicillins] Hives and Shortness of Breath     Throat closes shut    Metformin Diarrhea    Ciprofloxacin Shortness of Breath    Morphine Angioedema     Throat swells    Adhesive Tape-Silicones Rash       Current Outpatient Medications   Medication Sig    dulaglutide (Trulicity) 1.5 EZ/1.1 mL sub-q pen STRENGTH INCREASE!  INJECT 1 PEN SUBCUTANEOUSLY EVERY 7 DAYS AS DIRECTED    SITagliptin (Januvia) 100 mg tablet TAKE 1 TABLET EVERY DAY    glipiZIDE (GLUCOTROL) 10 mg tablet TAKE 1 TABLET TWICE DAILY    losartan (COZAAR) 50 mg tablet TAKE 1 TABLET EVERY DAY    glucose blood VI test strips (TRUE METRIX GLUCOSE TEST STRIP) strip TEST THREE TIMES DAILY    lancets (TRUEPLUS LANCETS) 33 gauge misc TEST THREE TIMES DAILY    TRUE METRIX AIR GLUCOSE METER monitoring kit USE AS DIRECTED    vit O-A-H3-E-omega-3-ala-dha 250-3-50 unit,mg,unit chew Take 1 Tab by mouth daily.  cholecalciferol (VITAMIN D3) 1,000 unit cap Take 1,000 Units by mouth daily.  ferrous sulfate 325 mg (65 mg iron) tablet Take  by mouth Daily (before breakfast).  vitamin E (AQUA GEMS) 400 unit capsule Take 400 Units by mouth daily.  ascorbic acid (VITAMIN C) 500 mg tablet Take 500 mg by mouth daily.  multivitamin (ONE A DAY) tablet Take 1 Tab by mouth daily.  cyanocobalamin (VITAMIN B-12) 1,000 mcg tablet Take 1,000 mcg by mouth daily. No current facility-administered medications for this visit.         Social History     Socioeconomic History    Marital status: SINGLE     Spouse name: Not on file    Number of children: Not on file    Years of education: Not on file    Highest education level: Not on file   Tobacco Use    Smoking status: Current Every Day Smoker     Packs/day: 0.50     Types: Cigarettes    Smokeless tobacco: Never Used    Tobacco comment: some days less   Substance and Sexual Activity    Alcohol use: Yes     Comment: rare    Drug use: No    Sexual activity: Not Currently     Partners: Male       Family History   Problem Relation Age of Onset    COPD Mother     Asthma Mother     Heart Disease Father     Cancer Maternal Grandmother     Stroke Maternal Grandmother     Cancer Maternal Grandfather     Cancer Paternal Grandmother     Heart Disease Paternal Grandfather          Physical Exam:  Visit Vitals  /70 (BP 1 Location: Left upper arm, BP Patient Position: At rest, BP Cuff Size: Adult) Pulse 68   Temp 97 °F (36.1 °C) (Core)   Resp 18   Ht 5' 3\" (1.6 m)   Wt 174 lb (78.9 kg)   SpO2 98%   BMI 30.82 kg/m²     Physical Exam  Vitals signs and nursing note reviewed. Constitutional:       Appearance: Normal appearance. HENT:      Head: Normocephalic and atraumatic. Neck:      Musculoskeletal: Neck supple. Cardiovascular:      Rate and Rhythm: Normal rate and regular rhythm. Pulses: Normal pulses. Heart sounds: Normal heart sounds. Pulmonary:      Effort: Pulmonary effort is normal.      Breath sounds: Normal breath sounds. Musculoskeletal:         General: No swelling. Skin:     General: Skin is warm and dry. Neurological:      Mental Status: She is alert and oriented to person, place, and time. Psychiatric:         Mood and Affect: Mood normal.         Assessment/Plan:    ICD-10-CM ICD-9-CM    1. Benign essential HTN  I10 401.1 CBC WITH AUTOMATED DIFF      METABOLIC PANEL, COMPREHENSIVE      METABOLIC PANEL, COMPREHENSIVE      CBC WITH AUTOMATED DIFF   2. Type 2 diabetes with nephropathy (HCC)  E11.21 250.40 HEMOGLOBIN A1C WITH EAG     583.81 CBC WITH AUTOMATED DIFF      METABOLIC PANEL, COMPREHENSIVE      MICROALBUMIN, UR, RAND W/ MICROALB/CREAT RATIO      MICROALBUMIN, UR, RAND W/ MICROALB/CREAT RATIO      METABOLIC PANEL, COMPREHENSIVE      CBC WITH AUTOMATED DIFF      HEMOGLOBIN A1C WITH EAG      [place] [facility]&/OR CONVEY OF SPEC FOR TR OFFICE TO LAB      COLLECTION VENOUS BLOOD,VENIPUNCTURE   3. Vitamin D deficiency  E55.9 268.9 VITAMIN D, 25 HYDROXY      VITAMIN D, 25 HYDROXY   4. Elevated cholesterol  E78.00 272.0 LIPID PANEL      LIPID PANEL       3-6 mo f/u pending lab results. Pt otherwise stable on current regimen. Encouraged her to continue to work on LMs. Seek care in interim for any new sxs or other concerns. Pt verbalizes understanding and agrees with the plan.     Darcie Leigh PA-C

## 2021-03-10 LAB
25(OH)D3 SERPL-MCNC: 61.5 NG/ML (ref 30–100)
ALBUMIN SERPL-MCNC: 3.8 G/DL (ref 3.5–5)
ALBUMIN/GLOB SERPL: 1.2 {RATIO} (ref 1.1–2.2)
ALP SERPL-CCNC: 105 U/L (ref 45–117)
ALT SERPL-CCNC: 20 U/L (ref 12–78)
ANION GAP SERPL CALC-SCNC: 5 MMOL/L (ref 5–15)
AST SERPL-CCNC: 12 U/L (ref 15–37)
BASOPHILS # BLD: 0 K/UL (ref 0–0.1)
BASOPHILS NFR BLD: 0 % (ref 0–1)
BILIRUB SERPL-MCNC: 0.3 MG/DL (ref 0.2–1)
BUN SERPL-MCNC: 29 MG/DL (ref 6–20)
BUN/CREAT SERPL: 24 (ref 12–20)
CALCIUM SERPL-MCNC: 9.3 MG/DL (ref 8.5–10.1)
CHLORIDE SERPL-SCNC: 106 MMOL/L (ref 97–108)
CHOLEST SERPL-MCNC: 171 MG/DL
CO2 SERPL-SCNC: 29 MMOL/L (ref 21–32)
CREAT SERPL-MCNC: 1.21 MG/DL (ref 0.55–1.02)
DIFFERENTIAL METHOD BLD: ABNORMAL
EOSINOPHIL # BLD: 0.1 K/UL (ref 0–0.4)
EOSINOPHIL NFR BLD: 1 % (ref 0–7)
ERYTHROCYTE [DISTWIDTH] IN BLOOD BY AUTOMATED COUNT: 13.3 % (ref 11.5–14.5)
EST. AVERAGE GLUCOSE BLD GHB EST-MCNC: 183 MG/DL
GLOBULIN SER CALC-MCNC: 3.3 G/DL (ref 2–4)
GLUCOSE SERPL-MCNC: 208 MG/DL (ref 65–100)
HBA1C MFR BLD: 8 % (ref 4–5.6)
HCT VFR BLD AUTO: 38.2 % (ref 35–47)
HDLC SERPL-MCNC: 59 MG/DL
HDLC SERPL: 2.9 {RATIO} (ref 0–5)
HGB BLD-MCNC: 12.4 G/DL (ref 11.5–16)
IMM GRANULOCYTES # BLD AUTO: 0.1 K/UL (ref 0–0.04)
IMM GRANULOCYTES NFR BLD AUTO: 1 % (ref 0–0.5)
LDLC SERPL CALC-MCNC: 94 MG/DL (ref 0–100)
LIPID PROFILE,FLP: NORMAL
LYMPHOCYTES # BLD: 1.8 K/UL (ref 0.8–3.5)
LYMPHOCYTES NFR BLD: 21 % (ref 12–49)
MCH RBC QN AUTO: 30.6 PG (ref 26–34)
MCHC RBC AUTO-ENTMCNC: 32.5 G/DL (ref 30–36.5)
MCV RBC AUTO: 94.3 FL (ref 80–99)
MONOCYTES # BLD: 0.6 K/UL (ref 0–1)
MONOCYTES NFR BLD: 7 % (ref 5–13)
NEUTS SEG # BLD: 6.3 K/UL (ref 1.8–8)
NEUTS SEG NFR BLD: 70 % (ref 32–75)
NRBC # BLD: 0 K/UL (ref 0–0.01)
NRBC BLD-RTO: 0 PER 100 WBC
PLATELET # BLD AUTO: 226 K/UL (ref 150–400)
PMV BLD AUTO: 10.8 FL (ref 8.9–12.9)
POTASSIUM SERPL-SCNC: 4.5 MMOL/L (ref 3.5–5.1)
PROT SERPL-MCNC: 7.1 G/DL (ref 6.4–8.2)
RBC # BLD AUTO: 4.05 M/UL (ref 3.8–5.2)
SODIUM SERPL-SCNC: 140 MMOL/L (ref 136–145)
TRIGL SERPL-MCNC: 90 MG/DL (ref ?–150)
VLDLC SERPL CALC-MCNC: 18 MG/DL
WBC # BLD AUTO: 8.9 K/UL (ref 3.6–11)

## 2021-03-10 NOTE — PROGRESS NOTES
Notify pt kidney tests still slightly elevated, avoid nsaids/stay hydrated, get BS under better control. Will continue to monitor this closely.  Will recheck with next a1c

## 2021-03-10 NOTE — PROGRESS NOTES
Notify pt, in addn to other labs, her cholesterol is doing much better now! Lipids are all wnl. However, her a1c is still up at 8. It has improved a little from 8.4 but would like to see it closer to 7-7.5  I know she said she could clean up her diet a bit after having some difficult times recently, so I would just continue working on things (avoiding conc sweets and really limiting the amt of carbs/starches) and also being more active as it warms up I am sure will help with her also. It would be beneficial to check her BS some mornings fasting and report a log to me in 2 weeks (can be via phone call/drop off/[employer] or VV if she prefers) and we can go from there. Otherwise, we will need to recheck her labs again in 3 months. Also please notify pt I consulted with another provider who agrees if she had a serious anaphylactic reaction to a past vaccine she will likely not be able to get the COVID vaccine. If he knew the component she was allergic to (for ex some ppl its eggs) it may be a different story, depending on the vaccine. But would not advise if she is not sure and knows she had a serious reaction to any past vaccination.

## 2021-03-10 NOTE — PROGRESS NOTES
Spoke with patient after verifying Name, and , informed patient of lab results and recommendations per Joel Pablo PA-C . Reviewed COVID Vaccine information with patient regarding reaction. Patient given an opportunity to ask questions, repeated information, and verbalized understanding.

## 2021-03-10 NOTE — PROGRESS NOTES
Spoke with patient after verifying Name, and , informed patient of CBC lab results per Dominick Diaz PA-C . Patient given an opportunity to ask questions, repeated information, and verbalized understanding.

## 2021-03-10 NOTE — PROGRESS NOTES
Spoke with patient after verifying Name, and , informed patient of lab results and recommendations per Ananth Villanueva PA-C . Patient given an opportunity to ask questions, repeated information, and verbalized understanding.

## 2021-03-10 NOTE — PROGRESS NOTES
Spoke with patient after verifying Name, and , informed patient of Vitamin D lab results per Joel Soto PA-C. Patient given an opportunity to ask questions, repeated information, and verbalized understanding.

## 2021-07-05 DIAGNOSIS — E11.21 TYPE 2 DIABETES MELLITUS WITH DIABETIC NEPHROPATHY, WITHOUT LONG-TERM CURRENT USE OF INSULIN (HCC): ICD-10-CM

## 2021-07-05 DIAGNOSIS — I10 BENIGN ESSENTIAL HTN: ICD-10-CM

## 2021-07-07 RX ORDER — LOSARTAN POTASSIUM 50 MG/1
TABLET ORAL
Qty: 90 TABLET | Refills: 0 | Status: SHIPPED | OUTPATIENT
Start: 2021-07-07 | End: 2021-09-21 | Stop reason: SDUPTHER

## 2021-07-07 RX ORDER — GLIPIZIDE 10 MG/1
TABLET ORAL
Qty: 180 TABLET | Refills: 0 | Status: SHIPPED | OUTPATIENT
Start: 2021-07-07 | End: 2021-09-21 | Stop reason: SDUPTHER

## 2021-09-06 DIAGNOSIS — E11.21 TYPE 2 DIABETES WITH NEPHROPATHY (HCC): ICD-10-CM

## 2021-09-08 RX ORDER — DULAGLUTIDE 1.5 MG/.5ML
INJECTION, SOLUTION SUBCUTANEOUS
Qty: 2 ML | Refills: 5 | Status: SHIPPED | OUTPATIENT
Start: 2021-09-08 | End: 2021-09-23 | Stop reason: DRUGHIGH

## 2021-09-21 ENCOUNTER — OFFICE VISIT (OUTPATIENT)
Dept: FAMILY MEDICINE CLINIC | Age: 76
End: 2021-09-21
Payer: MEDICARE

## 2021-09-21 VITALS
OXYGEN SATURATION: 97 % | WEIGHT: 178 LBS | BODY MASS INDEX: 30.39 KG/M2 | DIASTOLIC BLOOD PRESSURE: 82 MMHG | HEIGHT: 64 IN | RESPIRATION RATE: 14 BRPM | SYSTOLIC BLOOD PRESSURE: 166 MMHG | HEART RATE: 88 BPM | TEMPERATURE: 98.1 F

## 2021-09-21 DIAGNOSIS — I10 BENIGN ESSENTIAL HTN: ICD-10-CM

## 2021-09-21 DIAGNOSIS — E78.00 ELEVATED CHOLESTEROL: ICD-10-CM

## 2021-09-21 DIAGNOSIS — E11.21 TYPE 2 DIABETES MELLITUS WITH DIABETIC NEPHROPATHY, WITHOUT LONG-TERM CURRENT USE OF INSULIN (HCC): Primary | ICD-10-CM

## 2021-09-21 PROCEDURE — G8754 DIAS BP LESS 90: HCPCS | Performed by: PHYSICIAN ASSISTANT

## 2021-09-21 PROCEDURE — G8432 DEP SCR NOT DOC, RNG: HCPCS | Performed by: PHYSICIAN ASSISTANT

## 2021-09-21 PROCEDURE — G8427 DOCREV CUR MEDS BY ELIG CLIN: HCPCS | Performed by: PHYSICIAN ASSISTANT

## 2021-09-21 PROCEDURE — 3017F COLORECTAL CA SCREEN DOC REV: CPT | Performed by: PHYSICIAN ASSISTANT

## 2021-09-21 PROCEDURE — 3052F HG A1C>EQUAL 8.0%<EQUAL 9.0%: CPT | Performed by: PHYSICIAN ASSISTANT

## 2021-09-21 PROCEDURE — 1101F PT FALLS ASSESS-DOCD LE1/YR: CPT | Performed by: PHYSICIAN ASSISTANT

## 2021-09-21 PROCEDURE — G8536 NO DOC ELDER MAL SCRN: HCPCS | Performed by: PHYSICIAN ASSISTANT

## 2021-09-21 PROCEDURE — G8753 SYS BP > OR = 140: HCPCS | Performed by: PHYSICIAN ASSISTANT

## 2021-09-21 PROCEDURE — 99214 OFFICE O/P EST MOD 30 MIN: CPT | Performed by: PHYSICIAN ASSISTANT

## 2021-09-21 PROCEDURE — G8400 PT W/DXA NO RESULTS DOC: HCPCS | Performed by: PHYSICIAN ASSISTANT

## 2021-09-21 PROCEDURE — 36415 COLL VENOUS BLD VENIPUNCTURE: CPT | Performed by: PHYSICIAN ASSISTANT

## 2021-09-21 PROCEDURE — 1090F PRES/ABSN URINE INCON ASSESS: CPT | Performed by: PHYSICIAN ASSISTANT

## 2021-09-21 PROCEDURE — 2022F DILAT RTA XM EVC RTNOPTHY: CPT | Performed by: PHYSICIAN ASSISTANT

## 2021-09-21 PROCEDURE — G8417 CALC BMI ABV UP PARAM F/U: HCPCS | Performed by: PHYSICIAN ASSISTANT

## 2021-09-21 RX ORDER — GLIPIZIDE 10 MG/1
10 TABLET ORAL DAILY
Qty: 90 TABLET | Refills: 1 | Status: SHIPPED | OUTPATIENT
Start: 2021-09-21 | End: 2022-10-17 | Stop reason: SDUPTHER

## 2021-09-21 RX ORDER — LOSARTAN POTASSIUM 50 MG/1
TABLET ORAL
Qty: 90 TABLET | Refills: 1 | Status: SHIPPED | OUTPATIENT
Start: 2021-09-21 | End: 2022-02-17 | Stop reason: SDUPTHER

## 2021-09-21 NOTE — LETTER
9/21/2021 8:00 AM    To Whom It May Concer,     MsJill Crandall has a reported history of severe reaction to vaccines, including hospitalization, after receiving flu and pneumonia vaccines in the past and feel it may not be in her best interest to receive others, including the COVID vaccine, unless a specific vaccine ingredient can be identified that she has was previously allergic to, which at this time is unknown.      Sincerely,      Daysi Herzog PA-C

## 2021-09-21 NOTE — LETTER
9/21/2021 8:07 AM    To Whom It May Concern,    Ms. Niki eTjada is a current patient at our facility. She has a reported history of severe reaction to vaccines, including hospitalization, after receiving flu and pneumonia vaccines in the past and feel it may not be in her best interest to receive others, including the COVID vaccine, as we are currently unaware of a specific vaccine ingredient she previously reacted to.         Sincerely,      Yovani Viera PA-C

## 2021-09-21 NOTE — PROGRESS NOTES
Irene Amaya is a 78 y.o. female who presents to the office today with the following:  Chief Complaint   Patient presents with    Diabetes     follow up, patient refused medicare questionaire       HPI  Pt reports doing well. Lab Results   Component Value Date/Time    Hemoglobin A1c 8.0 (H) 03/08/2021 07:48 PM   no BS checks. A week out of trulicity with insurance issues, but got first shot last night. Gets diarrhea and vomiting when she is out, feeling better now. HTN   Pt says she is doing good at home. Always high here. She notes most of the time 110s/70s. Sometimes 120s-80s. She denies having any cardiac complaints. Lab Results   Component Value Date/Time    Sodium 140 03/08/2021 07:48 PM    Potassium 4.5 03/08/2021 07:48 PM    Chloride 106 03/08/2021 07:48 PM    CO2 29 03/08/2021 07:48 PM    Anion gap 5 03/08/2021 07:48 PM    Glucose 208 (H) 03/08/2021 07:48 PM    BUN 29 (H) 03/08/2021 07:48 PM    Creatinine 1.21 (H) 03/08/2021 07:48 PM    BUN/Creatinine ratio 24 (H) 03/08/2021 07:48 PM    GFR est AA 53 (L) 03/08/2021 07:48 PM    GFR est non-AA 44 (L) 03/08/2021 07:48 PM    Calcium 9.3 03/08/2021 07:48 PM    Bilirubin, total 0.3 03/08/2021 07:48 PM    Alk. phosphatase 105 03/08/2021 07:48 PM    Protein, total 7.1 03/08/2021 07:48 PM    Albumin 3.8 03/08/2021 07:48 PM    Globulin 3.3 03/08/2021 07:48 PM    A-G Ratio 1.2 03/08/2021 07:48 PM    ALT (SGPT) 20 03/08/2021 07:48 PM    AST (SGOT) 12 (L) 03/08/2021 07:48 PM     Pt states she had to be hospitalized for severe reaction to vaccines in the past.  Pt reports her previous PCP Dr. Leopold Alba did this, but he passed away and their office closed, was never able to find her records. Has had tongue swelling and breathing difficulty with both the flu and pneumonia vaccines and does not want to risk COVID-19 vaccine.  Says she spoke with the [facility] and they said she probably shouldn't risk it either, but would like a letter to have on hand.    ROS  See HPI. Past Medical History:   Diagnosis Date    Diabetes ([address]     Diplopia 4/5/2017    Hypertension     Mobitz (type) II atrioventricular block 4/5/2017    Proteinuria     S/P cardiac pacemaker procedure 4/6/2017 4/6/17 Medtronic dual chamber pacemaker implant    Tobacco abuse 4/5/2017       Past Surgical History:   Procedure Laterality Date    HX APPENDECTOMY      HX CHOLECYSTECTOMY      HX COLONOSCOPY  2014    nl, q 2-3 y    HX GYN      hysterectomy    HX ORTHOPAEDIC  1979    back    HX PACEMAKER  04/06/2017    pacemaker placed on the left side       Allergies   Allergen Reactions    Codeine Hives and Shortness of Breath     Throat closes shut    Flowers Anaphylaxis     roses    Keflex [Cephalexin] Hives and Shortness of Breath     Throat closes shut    Pcn [Penicillins] Hives and Shortness of Breath     Throat closes shut    Metformin Diarrhea    Ciprofloxacin Shortness of Breath    Morphine Angioedema     Throat swells    Adhesive Tape-Silicones Rash       Current Outpatient Medications   Medication Sig    SITagliptin (Januvia) 100 mg tablet TAKE 1 TABLET EVERY DAY    losartan (COZAAR) 50 mg tablet TAKE 1 TABLET EVERY DAY    glipiZIDE (GLUCOTROL) 10 mg tablet Take 1 Tablet by mouth daily.  dulaglutide (Trulicity) 1.5 AB/0.8 mL sub-q pen INJECT 1 PEN (0.5ML) SUBCUTANEOUSLY EVERY 7 DAYS AS DIRECTED    glucose blood VI test strips (TRUE METRIX GLUCOSE TEST STRIP) strip TEST THREE TIMES DAILY    lancets (TRUEPLUS LANCETS) 33 gauge misc TEST THREE TIMES DAILY    TRUE METRIX AIR GLUCOSE METER monitoring kit USE AS DIRECTED    vit V-Z-S7-E-omega-3-ala-dha 250-3-50 unit,mg,unit chew Take 1 Tab by mouth daily.  cholecalciferol (VITAMIN D3) 1,000 unit cap Take 1,000 Units by mouth daily.  vitamin E (AQUA GEMS) 400 unit capsule Take 400 Units by mouth daily.  ascorbic acid (VITAMIN C) 500 mg tablet Take 500 mg by mouth daily.     multivitamin (ONE A DAY) tablet Take 1 Tab by mouth daily.  cyanocobalamin (VITAMIN B-12) 1,000 mcg tablet Take 1,000 mcg by mouth daily.  ferrous sulfate 325 mg (65 mg iron) tablet Take  by mouth Daily (before breakfast). No current facility-administered medications for this visit. Social History     Socioeconomic History    Marital status: SINGLE     Spouse name: Not on file    Number of children: Not on file    Years of education: Not on file    Highest education level: Not on file   Tobacco Use    Smoking status: Current Every Day Smoker     Packs/day: 0.50     Types: Cigarettes    Smokeless tobacco: Never Used    Tobacco comment: some days less   Substance and Sexual Activity    Alcohol use: Yes     Comment: rare    Drug use: No    Sexual activity: Not Currently     Partners: Male     Social Determinants of Health     Financial Resource Strain:     Difficulty of Paying Living Expenses:    Food Insecurity:     Worried About Running Out of Food in the Last Year:     Ran Out of Food in the Last Year:    Transportation Needs:     Lack of Transportation (Medical):      Lack of Transportation (Non-Medical):    Physical Activity:     Days of Exercise per Week:     Minutes of Exercise per Session:    Stress:     Feeling of Stress :    Social Connections:     Frequency of Communication with Friends and Family:     Frequency of Social Gatherings with Friends and Family:     Attends Tenri[employer] Services:     Active Member of Clubs or Organizations:     Attends Club or Organization Meetings:     Marital Status:        Family History   Problem Relation Age of Onset    COPD Mother     Asthma Mother     Heart Disease Father     Cancer Maternal Grandmother     Stroke Maternal Grandmother     Cancer Maternal Grandfather     Cancer Paternal Grandmother     Heart Disease Paternal Grandfather          Physical Exam:  Visit Vitals  BP (!) 166/82   Pulse 88   Temp 98.1 °F (36.7 °C)   Resp 14   Ht 5' 4\" (1.626 m) Wt 178 lb (80.7 kg)   SpO2 97%   BMI 30.55 kg/m²     Physical Exam  Vitals and nursing note reviewed. Constitutional:       Appearance: Normal appearance. HENT:      Head: Normocephalic and atraumatic. Eyes:      Conjunctiva/sclera: Conjunctivae normal.   Cardiovascular:      Rate and Rhythm: Normal rate and regular rhythm. Pulses: Normal pulses. Heart sounds: Normal heart sounds. Pulmonary:      Effort: Pulmonary effort is normal.      Breath sounds: Normal breath sounds. Musculoskeletal:         General: No swelling. Cervical back: Neck supple. Skin:     General: Skin is warm and dry. Neurological:      Mental Status: She is alert and oriented to person, place, and time. Mental status is at baseline. Psychiatric:         Mood and Affect: Mood normal.         Assessment/Plan:    ICD-10-CM ICD-9-CM    1. Type 2 diabetes mellitus with diabetic nephropathy, without long-term current use of insulin (HCC)  E11.21 250.40 HEMOGLOBIN A1C WITH EAG     583.81 SITagliptin (Januvia) 100 mg tablet      glipiZIDE (GLUCOTROL) 10 mg tablet      HEMOGLOBIN A1C WITH EAG      AK HANDLG&/OR CONVEY OF SPEC FOR TR OFFICE TO LAB      COLLECTION VENOUS BLOOD,VENIPUNCTURE         2. Benign essential HTN  N11 309.0 METABOLIC PANEL, BASIC      losartan (COZAAR) 50 mg tablet      METABOLIC PANEL, BASIC   3. Elevated cholesterol  E78.00 272.0      Pt to continue to work on LMs. Encouraged her to bring in cuff to compare. If accurate then will be okay with her wnl home BP readings, otherwise may need to return within the month for better BP control. Pt told to make nurse apt for this. encouraged pt f/u needed with her cardiologist (she tells me has not seen in a year or more)  Will notify patient of lab results and any further recommendations. Seek care in interim for any new sxs or other concerns. Pt verbalizes understanding and agrees with the plan.     Joaquin Coon PA-C

## 2021-09-21 NOTE — PROGRESS NOTES
1. Have you been to the ER, urgent care clinic since your last visit? Hospitalized since your last visit? No    2. Have you seen or consulted any other health care providers outside of the [address] [place] [address] since your last visit? Include any pap smears or colon screening.  No

## 2021-09-22 LAB
ANION GAP SERPL CALC-SCNC: 3 MMOL/L (ref 5–15)
BUN SERPL-MCNC: 16 MG/DL (ref 6–20)
BUN/CREAT SERPL: 16 (ref 12–20)
CALCIUM SERPL-MCNC: 9.4 MG/DL (ref 8.5–10.1)
CHLORIDE SERPL-SCNC: 106 MMOL/L (ref 97–108)
CO2 SERPL-SCNC: 30 MMOL/L (ref 21–32)
CREAT SERPL-MCNC: 1.03 MG/DL (ref 0.55–1.02)
EST. AVERAGE GLUCOSE BLD GHB EST-MCNC: 183 MG/DL
GLUCOSE SERPL-MCNC: 240 MG/DL (ref 65–100)
HBA1C MFR BLD: 8 % (ref 4–5.6)
POTASSIUM SERPL-SCNC: 4.4 MMOL/L (ref 3.5–5.1)
SODIUM SERPL-SCNC: 139 MMOL/L (ref 136–145)

## 2021-09-23 RX ORDER — DULAGLUTIDE 3 MG/.5ML
3 INJECTION, SOLUTION SUBCUTANEOUS
Qty: 4 EACH | Refills: 2 | Status: SHIPPED | OUTPATIENT
Start: 2021-09-23 | End: 2022-04-19

## 2021-09-23 NOTE — PROGRESS NOTES
Notify pt her a1c is still high at 8. We can try to increase her trulicity but is almost maxed out of most of her diabetes medications. She should work on diet more and return with a 1-2 log of BS so I can review. If needs anything for testing let me know. I will send a new Rx. Needs to recheck a1c every 3 months until under 7.5  Her FBS should be around 130. If we cannot get this under control, may end up needing insulin to replace or supplement other medications. Remaining labs are otherwise stable.

## 2021-09-29 DIAGNOSIS — E11.21 TYPE 2 DIABETES MELLITUS WITH DIABETIC NEPHROPATHY, WITHOUT LONG-TERM CURRENT USE OF INSULIN (HCC): ICD-10-CM

## 2021-09-30 NOTE — PROGRESS NOTES
2 pt identifiers verified name and , pt aware, states understands    Pt states she was out of meds for 2 weeks, she's not checking her blood sugars, and she's not starting insulin. She will recheck in 3 months!

## 2021-10-04 NOTE — PROGRESS NOTES
Called pt back she said if you want to increase what she is already on that is fine, but wait until the end of week before you send anything in, but she said she is watching what she eats. I read her the message you wrote at the bottom results note that said if you can't get it under control she might have to start insulin, that's were that came from.

## 2021-11-10 ENCOUNTER — TELEPHONE (OUTPATIENT)
Dept: PHARMACY | Age: 76
End: 2021-11-10

## 2021-11-10 NOTE — TELEPHONE ENCOUNTER
[facility] CLINICAL PHARMACY: STATIN THERAPY REVIEW  Identified statin use in persons with diabetes care gap per [facility] Records dated: 10/24/2021. Last Visit: 9/21/2021    Patient also appears to be prescribed: DM, HTN    Patient not found in Outcomes MTM    ASSESSMENT  ACE/ARB ADHERENCE  Per Insurance Records through 10/24/2021 ([facility] = 100%)   Losartan last filled on 7/17/2021 for 90 day supply. Next refill due: 11/27/2021    BP Readings from Last 3 Encounters:   09/21/21 (!) 166/82   03/08/21 130/70   07/27/20 160/70     Estimated Creatinine Clearance: 49.2 mL/min (A) (by C-G formula based on SCr of 1.03 mg/dL (H)). DIABETES ADHERENCE  Per Insurance Records through 10/24/2021 ([facility] = 100%)  Sitagliptin last filled on 10/11/2021 for 90 day supply. Next refill due: 4/4/2022    Lab Results   Component Value Date/Time    Hemoglobin A1c 8.0 (H) 09/21/2021 08:04 AM    Hemoglobin A1c 8.0 (H) 03/08/2021 07:48 PM    Hemoglobin A1c 8.4 (H) 07/27/2020 08:44 AM     NOTE A1c <9%    STATIN GAP IDENTIFIED    Per chart review, patient has not been prescribed a statin.     Lab Results   Component Value Date/Time    Cholesterol, total 171 03/08/2021 07:48 PM    HDL Cholesterol 59 03/08/2021 07:48 PM    LDL, calculated 94 03/08/2021 07:48 PM    VLDL, calculated 18 03/08/2021 07:48 PM    Triglyceride 90 03/08/2021 07:48 PM    CHOL/HDL Ratio 2.9 03/08/2021 07:48 PM     ALT (SGPT)   Date Value Ref Range Status   03/08/2021 20 12 - 78 U/L Final     AST (SGOT)   Date Value Ref Range Status   03/08/2021 12 (L) 15 - 37 U/L Final     The 10-year ASCVD risk score (Arley Gambino, et al., 2013) is: 63.8%    Values used to calculate the score:      Age: 78 years      Sex: Female      Is Non- : No      Diabetic: Yes      Tobacco smoker: Yes      Systolic Blood Pressure: 091 mmHg      Is BP treated: Yes      HDL Cholesterol: 59 MG/DL      Total Cholesterol: 171 MG/DL     Hyperlipidemia Goal: Patient has a 10-yr ASCVD risk of >20% with DM and is therefore a candidate for high-intensity statin therapy based on updated guidelines. 2019 ADA Guidelines Age: 78    >/= 36years old:   o 10-year ASCVD risk > 20% (63.8%) - high-intensity statin is recommended. Of note,   · 3/8/2021 (PCP visit) - \"no interest in cholesterol medication, aware of risks and indications\"  · 7/27/2020 (PCP visit) - \"still not interested in statin tx. Aware of guidelines and recommendations\"  · 5/27/2020 [facility] Request fax): \"patient refuses to start this medication despite risk factors\"    PLAN  The following are interventions that have been identified:  - Patient identified as having SUPD gap    Reached patient to review. Pt reiterated how she still is not interested with starting a cholesterol medication early in the phone call. I stated how we just want to make sure we're on the same page as you with regards to starting it and she appreciates us thinking of her and reaching out. No future appointments.     Cheron Snellen, PharmD  [facility] PGY1 Pharmacy Resident  Population Health Rotation  Department toll free: [phone], option 2      ==========================================================    For Pharmacy Admin Tracking Only     Recommendation Provided To: Patient/Caregiver: 1 via Telephone -- did not accept recommendation   Gap Closed?: No   Time Spent (min): 35 minutes (chart review), 1 minute (phone call)

## 2022-02-01 LAB — HBA1C MFR BLD HPLC: 9.6 %

## 2022-02-17 ENCOUNTER — VIRTUAL VISIT (OUTPATIENT)
Dept: FAMILY MEDICINE CLINIC | Age: 77
End: 2022-02-17
Payer: MEDICARE

## 2022-02-17 DIAGNOSIS — E11.21 TYPE 2 DIABETES MELLITUS WITH DIABETIC NEPHROPATHY, WITHOUT LONG-TERM CURRENT USE OF INSULIN (HCC): ICD-10-CM

## 2022-02-17 DIAGNOSIS — I10 BENIGN ESSENTIAL HTN: ICD-10-CM

## 2022-02-17 DIAGNOSIS — Z87.01 H/O: PNEUMONIA: ICD-10-CM

## 2022-02-17 DIAGNOSIS — Z86.16 HISTORY OF COVID-19: ICD-10-CM

## 2022-02-17 DIAGNOSIS — Z09 HOSPITAL DISCHARGE FOLLOW-UP: Primary | ICD-10-CM

## 2022-02-17 PROCEDURE — 99442 PR PHYS/QHP TELEPHONE EVALUATION 11-20 MIN: CPT | Performed by: PHYSICIAN ASSISTANT

## 2022-02-17 RX ORDER — FAMOTIDINE 10 MG/1
10 TABLET ORAL 2 TIMES DAILY
COMMUNITY
Start: 2022-02-04 | End: 2022-02-28

## 2022-02-17 RX ORDER — LOSARTAN POTASSIUM 50 MG/1
TABLET ORAL
Qty: 90 TABLET | Refills: 1 | Status: SHIPPED | OUTPATIENT
Start: 2022-02-17 | End: 2022-07-08 | Stop reason: SDUPTHER

## 2022-02-17 RX ORDER — CALCIUM CITRATE/VITAMIN D3 200MG-6.25
TABLET ORAL
Qty: 300 STRIP | Refills: 11 | Status: SHIPPED | OUTPATIENT
Start: 2022-02-17

## 2022-02-17 RX ORDER — GUAIFENESIN 600 MG/1
600 TABLET, EXTENDED RELEASE ORAL 2 TIMES DAILY
COMMUNITY
Start: 2022-02-04 | End: 2022-02-28

## 2022-02-17 RX ORDER — INSULIN PUMP SYRINGE, 3 ML
EACH MISCELLANEOUS
Qty: 1 KIT | Refills: 0 | Status: SHIPPED | OUTPATIENT
Start: 2022-02-17

## 2022-02-17 RX ORDER — LANCETS 33 GAUGE
EACH MISCELLANEOUS
Qty: 100 LANCET | Refills: 11 | Status: SHIPPED | OUTPATIENT
Start: 2022-02-17

## 2022-02-17 NOTE — PROGRESS NOTES
Lyric Vazquez is a 78 y.o. female, evaluated via audio-only technology on 2/17/2022 for Hospital Follow Up (phone call only [phone]), Positive For Covid-19, Diabetes, and Pneumonia      Assessment & Plan:   Diagnoses and all orders for this visit:    1. Hospital discharge follow-up    2. Type 2 diabetes mellitus with diabetic nephropathy, without long-term current use of insulin (HCC)  -     lancets (TRUEplus Lancets) 33 gauge misc; TEST THREE TIMES DAILY  -     glucose blood VI test strips (True Metrix Glucose Test Strip) strip; TEST THREE TIMES DAILY    3. Benign essential HTN  -     losartan (COZAAR) 50 mg tablet; TAKE 1 TABLET EVERY DAY    4. History of COVID-19    5. H/O: pneumonia    Other orders  -     Blood-Glucose Meter monitoring kit; Use for TID blood sugar checks      Pt will f/up once receives glucometer in mail and will keep log of BS. She will also continue to monitor BP. Seek care for any recurring or new symptoms/concerns. Pt verbalizes understanding and agrees with the plan. The complexity of medical decision making for this visit is moderate       Subjective:   Pt says \"I feel good\" though still a bit slow walking, not using anything to assist in ambulation. Cooking and her \"food tastes good\"  Watching everything she eats/drinks, but currently unable to check sugars. Just discharged on Sunday Feb 13th. Son found her passed out in her floor. admitted for DKA and COVID-19/pneumonia. Still declines insulin, says allergic \"got itchy but no swelling\" when given in hospital.   Glucotrol increased to 20 mg daily and Trulicity to 4.4HG. she is okay on refills for now. Pt needs new glucometer and strips/lancets. Per pt BS was \"good\" Sunday before leaving the hospital. Was 229. She also has insulin at home provided by the hospital \"should she need it\"   Was told her covid and pneumonia resolved prior to discharge. No concerning other labs for f/up  Breathing has been good.  Pt notes O2 98% no sob/wheeze/barry or other breathing trouble. Also no cardiac complaints. On 2/1/22 a1c was 9.6  Pt notes BP also wnl at home. Prior to Admission medications    Medication Sig Start Date End Date Taking? Authorizing Provider   famotidine (PEPCID) 10 mg tablet Take 10 mg by mouth two (2) times a day. 2/4/22 3/6/22 Yes Provider, Historical   guaiFENesin ER (MUCINEX) 600 mg ER tablet Take 600 mg by mouth two (2) times a day. 2/4/22 3/6/22 Yes Provider, Historical   Blood-Glucose Meter monitoring kit Use for TID blood sugar checks 2/17/22  Yes Rema Hale PA-C   lancets (TRUEplus Lancets) 33 gauge misc TEST THREE TIMES DAILY 2/17/22  Yes Rema Hale PA-C   glucose blood VI test strips (True Metrix Glucose Test Strip) strip TEST THREE TIMES DAILY 2/17/22  Yes Rema Hale PA-C   losartan (COZAAR) 50 mg tablet TAKE 1 TABLET EVERY DAY 2/17/22  Yes Rema Hale PA-C   SITagliptin (Januvia) 100 mg tablet TAKE 1 TABLET EVERY DAY 9/30/21  Yes Rema Hale PA-C   dulaglutide (Trulicity) 3 XO/6.1 mL pnij 3 mg by SubCUTAneous route every seven (7) days. 9/23/21  Yes Rema Hale PA-C   glipiZIDE (GLUCOTROL) 10 mg tablet Take 1 Tablet by mouth daily. 9/21/21  Yes Rema Hale PA-C   TRUE METRIX AIR GLUCOSE METER monitoring kit USE AS DIRECTED 11/27/17  Yes Rema Hale PA-C   vit F-J-C8-E-omega-3-ala-dha 250-3-50 unit,mg,unit chew Take 1 Tab by mouth daily. Yes Provider, Historical   cholecalciferol (VITAMIN D3) 1,000 unit cap Take 1,000 Units by mouth daily. Yes Provider, Historical   ferrous sulfate 325 mg (65 mg iron) tablet Take  by mouth Daily (before breakfast). Yes Provider, Historical   vitamin E (AQUA GEMS) 400 unit capsule Take 400 Units by mouth daily. Yes Provider, Historical   ascorbic acid (VITAMIN C) 500 mg tablet Take 500 mg by mouth daily. Yes Provider, Historical   multivitamin (ONE A DAY) tablet Take 1 Tab by mouth daily.    Yes Provider, Historical cyanocobalamin (VITAMIN B-12) 1,000 mcg tablet Take 1,000 mcg by mouth daily. Yes Provider, Historical   losartan (COZAAR) 50 mg tablet TAKE 1 TABLET EVERY DAY 9/21/21 2/17/22  Juan Rucker PA-C   glucose blood VI test strips (TRUE METRIX GLUCOSE TEST STRIP) strip TEST THREE TIMES DAILY 9/6/18 2/17/22  Juan Rucker PA-C   lancets (TRUEPLUS LANCETS) 33 gauge misc TEST THREE TIMES DAILY 9/6/18 2/17/22  Juan Rucker PA-C     Patient Active Problem List   Diagnosis Code    Diabetes ([facility] [address] E11.9    Proteinuria R80.9    Benign essential HTN I10    Vitamin D deficiency E55.9    Fe deficiency anemia D50.9    Second degree AV block I44.1    Tobacco abuse Z72.0    Diplopia H53.2    S/P cardiac pacemaker procedure Z95.0    Type 2 diabetes with nephropathy ([facility] [address] E11.21    Elevated cholesterol E78.00     Patient Active Problem List    Diagnosis Date Noted    Elevated cholesterol 03/13/2020    Type 2 diabetes with nephropathy ([facility] 09/10/2019    S/P cardiac pacemaker procedure 04/06/2017    Second degree AV block 04/05/2017    Tobacco abuse 04/05/2017    Diplopia 04/05/2017    Benign essential HTN 06/14/2016    Vitamin D deficiency 06/14/2016    Fe deficiency anemia 06/14/2016    Proteinuria     Diabetes ([facility]      Current Outpatient Medications   Medication Sig Dispense Refill    famotidine (PEPCID) 10 mg tablet Take 10 mg by mouth two (2) times a day.  guaiFENesin ER (MUCINEX) 600 mg ER tablet Take 600 mg by mouth two (2) times a day.       Blood-Glucose Meter monitoring kit Use for TID blood sugar checks 1 Kit 0    lancets (TRUEplus Lancets) 33 gauge misc TEST THREE TIMES DAILY 100 Lancet 11    glucose blood VI test strips (True Metrix Glucose Test Strip) strip TEST THREE TIMES DAILY 300 Strip 11    losartan (COZAAR) 50 mg tablet TAKE 1 TABLET EVERY DAY 90 Tablet 1    SITagliptin (Januvia) 100 mg tablet TAKE 1 TABLET EVERY DAY 90 Tablet 1    dulaglutide (Trulicity) 3 FE/3.8 mL pnij 3 mg by SubCUTAneous route every seven (7) days. 4 Each 2    glipiZIDE (GLUCOTROL) 10 mg tablet Take 1 Tablet by mouth daily. 90 Tablet 1    TRUE METRIX AIR GLUCOSE METER monitoring kit USE AS DIRECTED 1 Kit 0    vit R-E-U0-E-omega-3-ala-dha 250-3-50 unit,mg,unit chew Take 1 Tab by mouth daily.  cholecalciferol (VITAMIN D3) 1,000 unit cap Take 1,000 Units by mouth daily.  ferrous sulfate 325 mg (65 mg iron) tablet Take  by mouth Daily (before breakfast).  vitamin E (AQUA GEMS) 400 unit capsule Take 400 Units by mouth daily.  ascorbic acid (VITAMIN C) 500 mg tablet Take 500 mg by mouth daily.  multivitamin (ONE A DAY) tablet Take 1 Tab by mouth daily.  cyanocobalamin (VITAMIN B-12) 1,000 mcg tablet Take 1,000 mcg by mouth daily.        Allergies   Allergen Reactions    Codeine Hives and Shortness of Breath     Throat closes shut    Flowers Anaphylaxis     roses    Keflex [Cephalexin] Hives and Shortness of Breath     Throat closes shut    Pcn [Penicillins] Hives and Shortness of Breath     Throat closes shut    Metformin Diarrhea    Ciprofloxacin Shortness of Breath    Morphine Angioedema     Throat swells    Adhesive Tape-Silicones Rash     Past Medical History:   Diagnosis Date    Diabetes ([address]     Diplopia 4/5/2017    Hypertension     Mobitz (type) II atrioventricular block 4/5/2017    Proteinuria     S/P cardiac pacemaker procedure 4/6/2017 4/6/17 Medtronic dual chamber pacemaker implant    Tobacco abuse 4/5/2017     Past Surgical History:   Procedure Laterality Date    HX APPENDECTOMY      HX CHOLECYSTECTOMY      HX COLONOSCOPY  2014    nl, q 2-3 y    HX GYN      hysterectomy    HX ORTHOPAEDIC  1979    back    HX PACEMAKER  04/06/2017    pacemaker placed on the left side     Family History   Problem Relation Age of Onset    COPD Mother     Asthma Mother     Heart Disease Father     Cancer Maternal Grandmother     Stroke Maternal Grandmother     Cancer Maternal Grandfather     Cancer Paternal Grandmother     Heart Disease Paternal Grandfather      Social History     Tobacco Use    Smoking status: Current Every Day Smoker     Packs/day: 0.50     Types: Cigarettes    Smokeless tobacco: Never Used    Tobacco comment: some days less   Substance Use Topics    Alcohol use: Yes     Comment: rare       ROS    No data recorded     Jose Gonzalez, who was evaluated through a patient-initiated, synchronous (real-time) audio only encounter, and/or her healthcare decision maker, is aware that it is a billable service, which includes applicable co-pays, with coverage as determined by her insurance carrier. She provided verbal consent to proceed. She has not had a related appointment within my department in the past 7 days or scheduled within the next 24 hours. The patient was located at home in a state where the provider was licensed to provide care. On this date 02/17/2022 I have spent 17 minutes reviewing previous notes, test results and telephone with the patient discussing the diagnosis and importance of compliance with the treatment plan as well as documenting on the day of the visit.     Peace Pasrons PA-C

## 2022-02-24 ENCOUNTER — TELEPHONE (OUTPATIENT)
Dept: FAMILY MEDICINE CLINIC | Age: 77
End: 2022-02-24

## 2022-02-28 ENCOUNTER — TELEPHONE (OUTPATIENT)
Dept: FAMILY MEDICINE CLINIC | Age: 77
End: 2022-02-28

## 2022-02-28 ENCOUNTER — OFFICE VISIT (OUTPATIENT)
Dept: FAMILY MEDICINE CLINIC | Age: 77
End: 2022-02-28
Payer: MEDICARE

## 2022-02-28 VITALS
DIASTOLIC BLOOD PRESSURE: 75 MMHG | SYSTOLIC BLOOD PRESSURE: 154 MMHG | HEART RATE: 99 BPM | HEIGHT: 64 IN | TEMPERATURE: 97.2 F | WEIGHT: 162.2 LBS | RESPIRATION RATE: 16 BRPM | OXYGEN SATURATION: 90 % | BODY MASS INDEX: 27.69 KG/M2

## 2022-02-28 DIAGNOSIS — E11.21 TYPE 2 DIABETES WITH NEPHROPATHY (HCC): Primary | ICD-10-CM

## 2022-02-28 PROCEDURE — 1090F PRES/ABSN URINE INCON ASSESS: CPT | Performed by: PHYSICIAN ASSISTANT

## 2022-02-28 PROCEDURE — G8427 DOCREV CUR MEDS BY ELIG CLIN: HCPCS | Performed by: PHYSICIAN ASSISTANT

## 2022-02-28 PROCEDURE — G8536 NO DOC ELDER MAL SCRN: HCPCS | Performed by: PHYSICIAN ASSISTANT

## 2022-02-28 PROCEDURE — 3046F HEMOGLOBIN A1C LEVEL >9.0%: CPT | Performed by: PHYSICIAN ASSISTANT

## 2022-02-28 PROCEDURE — 1101F PT FALLS ASSESS-DOCD LE1/YR: CPT | Performed by: PHYSICIAN ASSISTANT

## 2022-02-28 PROCEDURE — G8432 DEP SCR NOT DOC, RNG: HCPCS | Performed by: PHYSICIAN ASSISTANT

## 2022-02-28 PROCEDURE — 3017F COLORECTAL CA SCREEN DOC REV: CPT | Performed by: PHYSICIAN ASSISTANT

## 2022-02-28 PROCEDURE — G8754 DIAS BP LESS 90: HCPCS | Performed by: PHYSICIAN ASSISTANT

## 2022-02-28 PROCEDURE — G8753 SYS BP > OR = 140: HCPCS | Performed by: PHYSICIAN ASSISTANT

## 2022-02-28 PROCEDURE — G8417 CALC BMI ABV UP PARAM F/U: HCPCS | Performed by: PHYSICIAN ASSISTANT

## 2022-02-28 PROCEDURE — 99213 OFFICE O/P EST LOW 20 MIN: CPT | Performed by: PHYSICIAN ASSISTANT

## 2022-02-28 PROCEDURE — 2022F DILAT RTA XM EVC RTNOPTHY: CPT | Performed by: PHYSICIAN ASSISTANT

## 2022-02-28 PROCEDURE — G8400 PT W/DXA NO RESULTS DOC: HCPCS | Performed by: PHYSICIAN ASSISTANT

## 2022-02-28 NOTE — PROGRESS NOTES
Jesi Egan is a 78 y.o. female who presents to the office today with the following:  Chief Complaint   Patient presents with   [facility] ED Follow-up     went to ED last night with BS >500       HPI  Pt went to ER yesterday for BS over 500 but w/o any symptoms. Says she feels well, but was concerned of her numbers. We had intended to f/up after last virtual once pt got her glucometer after her last hospitalization, but pt says \"it wasn't bad on Friday when I got it\"  Was 225 before lunch that day, then before dinner was around the same or lower. Saturday had good readings of 150s. Says she is still pretty sure she had an allergy to the basal insulin given in the hospital previously, but did tolerate regular insulin (Humbulin, Novolin) 5 units well in ER. She is not wanting to do any basal insulin, says she does not know which one, but had swelling of her mouth/throat/tongue when it was given. She otherwise reports feeling well today with no additional complaints or acute concerns. ROS  See HPI.     Past Medical History:   Diagnosis Date    Diabetes ([address]     Diplopia 4/5/2017    Hypertension     Mobitz (type) II atrioventricular block 4/5/2017    Proteinuria     S/P cardiac pacemaker procedure 4/6/2017 4/6/17 Medtronic dual chamber pacemaker implant    Tobacco abuse 4/5/2017       Past Surgical History:   Procedure Laterality Date    HX APPENDECTOMY      HX CHOLECYSTECTOMY      HX COLONOSCOPY  2014    nl, q 2-3 y    HX GYN      hysterectomy    HX ORTHOPAEDIC  1979    back    HX PACEMAKER  04/06/2017    pacemaker placed on the left side       Allergies   Allergen Reactions    Codeine Hives and Shortness of Breath     Throat closes shut    Flowers Anaphylaxis     roses    Keflex [Cephalexin] Hives and Shortness of Breath     Throat closes shut    Pcn [Penicillins] Hives and Shortness of Breath     Throat closes shut    Metformin Diarrhea    Ciprofloxacin Shortness of Breath    Morphine Angioedema     Throat swells    Adhesive Tape-Silicones Rash       Current Outpatient Medications   Medication Sig    insulin regular (NOVOLIN R, HUMULIN R) 100 unit/mL injection Start with 3 units per each meal (breakfast, lunch, dinner) or as directed. Avoid skipping meals. Hold for low sugar readings and notify provider asap.  Blood-Glucose Meter monitoring kit Use for TID blood sugar checks    lancets (TRUEplus Lancets) 33 gauge misc TEST THREE TIMES DAILY    glucose blood VI test strips (True Metrix Glucose Test Strip) strip TEST THREE TIMES DAILY    losartan (COZAAR) 50 mg tablet TAKE 1 TABLET EVERY DAY    SITagliptin (Januvia) 100 mg tablet TAKE 1 TABLET EVERY DAY    dulaglutide (Trulicity) 3 KT/4.9 mL pnij 3 mg by SubCUTAneous route every seven (7) days.  glipiZIDE (GLUCOTROL) 10 mg tablet Take 1 Tablet by mouth daily.  TRUE METRIX AIR GLUCOSE METER monitoring kit USE AS DIRECTED    vit W-B-S0-E-omega-3-ala-dha 250-3-50 unit,mg,unit chew Take 1 Tab by mouth daily.  cholecalciferol (VITAMIN D3) 1,000 unit cap Take 1,000 Units by mouth daily.  ferrous sulfate 325 mg (65 mg iron) tablet Take  by mouth Daily (before breakfast).  vitamin E (AQUA GEMS) 400 unit capsule Take 400 Units by mouth daily.  ascorbic acid (VITAMIN C) 500 mg tablet Take 500 mg by mouth daily.  multivitamin (ONE A DAY) tablet Take 1 Tab by mouth daily.  cyanocobalamin (VITAMIN B-12) 1,000 mcg tablet Take 1,000 mcg by mouth daily. No current facility-administered medications for this visit.        Social History     Socioeconomic History    Marital status: SINGLE   Tobacco Use    Smoking status: Former Smoker     Packs/day: 0.50     Types: Cigarettes    Smokeless tobacco: Never Used    Tobacco comment: some days less   Substance and Sexual Activity    Alcohol use: Yes     Comment: rare    Drug use: No    Sexual activity: Not Currently     Partners: Male       Family History   Problem Relation Age of Onset    COPD Mother     Asthma Mother     Heart Disease Father     Cancer Maternal Grandmother     Stroke Maternal Grandmother     Cancer Maternal Grandfather     Cancer Paternal Grandmother     Heart Disease Paternal Grandfather          Physical Exam:  Visit Vitals  BP (!) 154/75 (BP 1 Location: Right arm, BP Patient Position: Sitting, BP Cuff Size: Adult)   Pulse 99   Temp 97.2 °F (36.2 °C) (Temporal)   Resp 16   Ht 5' 4\" (1.626 m)   Wt 162 lb 3.2 oz (73.6 kg)   SpO2 90%   BMI 27.84 kg/m²     Physical Exam  Vitals and nursing note reviewed. Constitutional:       Appearance: Normal appearance. HENT:      Head: Normocephalic and atraumatic. Cardiovascular:      Rate and Rhythm: Normal rate and regular rhythm. Pulses: Normal pulses. Heart sounds: Normal heart sounds. Pulmonary:      Effort: Pulmonary effort is normal.      Breath sounds: Normal breath sounds. Skin:     General: Skin is warm and dry. Neurological:      Mental Status: She is alert and oriented to person, place, and time. Psychiatric:         Mood and Affect: Mood normal.         Assessment/Plan:    ICD-10-CM ICD-9-CM    1. Type 2 diabetes with nephropathy (HCC)  E11.21 250.40 insulin regular (NOVOLIN R, HUMULIN R) 100 unit/mL injection     583.81 REFERRAL TO ENDOCRINOLOGY   discussed preference to start a basal insulin, however, pt states she had tongue swelling with that in the hospital and does not recall which one. She had regular insulin in ER yesterday and did not have a reaction. I have not yet been able to locate about the reaction in previous notes, but plan to continue to look. She was given insulin lispro (HumaLOG) and insulin glargine (LANTUS) per nots from 1/30/22. She is aware she will have to check her BS QID and will start with about 3 units at breakfast, lunch, and dinner for now. Hold for any low readings or SEs. She will f/up in 3 days to discuss blood sugars. Will have pt see Endo.   Spoke with Dr. Larissa Liu who agrees with the plan. Pt is to seek care in interim for any new sxs or other concerns. Pt verbalizes understanding and agrees with the plan.     Taylor Green PA-C

## 2022-02-28 NOTE — PROGRESS NOTES
1. \"Have you been to the ER, urgent care clinic since your last visit? Hospitalized since your last visit? \" Yes Where: [facility] for high BS    2. \"Have you seen or consulted any other health care providers outside of the [address] [place] [address] since your last visit? \" No     3. For patients aged 39-70: Has the patient had a colonoscopy / FIT/ Cologuard? Yes - no Care Gap present      If the patient is female:    4. For patients aged 41-77: Has the patient had a mammogram within the past 2 years? No      5. For patients aged 21-65: Has the patient had a pap smear?  No

## 2022-03-01 ENCOUNTER — TELEPHONE (OUTPATIENT)
Dept: FAMILY MEDICINE CLINIC | Age: 77
End: 2022-03-01

## 2022-03-01 NOTE — TELEPHONE ENCOUNTER
----- Message from Vargas Alvarado sent at 3/1/2022  1:46 PM EST -----  Subject: Message to Provider    QUESTIONS  Information for Provider? Pt needed to cancel her appointment for march 3   in the morning because she has not received her insulin and will   reschedule the appointment when she gets her insulin   ---------------------------------------------------------------------------  --------------  CALL BACK INFO  What is the best way for the office to contact you? OK to leave message on   voicemail  Preferred Call Back Phone Number? [phone]  ---------------------------------------------------------------------------  --------------  SCRIPT ANSWERS  Relationship to Patient?  Self

## 2022-03-02 ENCOUNTER — TELEPHONE (OUTPATIENT)
Dept: FAMILY MEDICINE CLINIC | Age: 77
End: 2022-03-02

## 2022-03-03 ENCOUNTER — TELEPHONE (OUTPATIENT)
Dept: FAMILY MEDICINE CLINIC | Age: 77
End: 2022-03-03

## 2022-03-04 ENCOUNTER — TELEPHONE (OUTPATIENT)
Dept: FAMILY MEDICINE CLINIC | Age: 77
End: 2022-03-04

## 2022-03-08 DIAGNOSIS — E11.21 TYPE 2 DIABETES WITH NEPHROPATHY (HCC): Primary | ICD-10-CM

## 2022-03-14 ENCOUNTER — TELEPHONE (OUTPATIENT)
Dept: FAMILY MEDICINE CLINIC | Age: 77
End: 2022-03-14

## 2022-03-14 NOTE — TELEPHONE ENCOUNTER
Pt calls to report that since she has started taking her new insulin, her blood sugars have been ranging between 159 and 200. She states she tests 3 times per day. Best number for call back is .

## 2022-03-15 NOTE — TELEPHONE ENCOUNTER
Tell pt her numbers sound like they are improved.   I would like her to continue what she is doing for now and recheck with me in the next 1-2 weeks (can do a call/virtual if she prefer not to come in with BS log)

## 2022-03-15 NOTE — TELEPHONE ENCOUNTER
I have called and talked to this patient. She verbalizes understanding of CHEKO Leon's response. She will call  to time to make her apt. She is waiting for new insurance and will bring it in to the office.

## 2022-03-16 DIAGNOSIS — E11.21 TYPE 2 DIABETES MELLITUS WITH DIABETIC NEPHROPATHY, WITHOUT LONG-TERM CURRENT USE OF INSULIN (HCC): ICD-10-CM

## 2022-03-18 PROBLEM — I44.1 SECOND DEGREE AV BLOCK: Status: ACTIVE | Noted: 2017-04-05

## 2022-03-18 PROBLEM — H53.2 DIPLOPIA: Status: ACTIVE | Noted: 2017-04-05

## 2022-03-19 PROBLEM — Z95.0 S/P CARDIAC PACEMAKER PROCEDURE: Status: ACTIVE | Noted: 2017-04-06

## 2022-03-19 PROBLEM — Z72.0 TOBACCO ABUSE: Status: ACTIVE | Noted: 2017-04-05

## 2022-03-19 PROBLEM — E78.00 ELEVATED CHOLESTEROL: Status: ACTIVE | Noted: 2020-03-13

## 2022-03-20 PROBLEM — E11.21 TYPE 2 DIABETES WITH NEPHROPATHY (HCC): Status: ACTIVE | Noted: 2019-09-10

## 2022-04-05 DIAGNOSIS — E11.21 TYPE 2 DIABETES WITH NEPHROPATHY (HCC): ICD-10-CM

## 2022-04-19 DIAGNOSIS — E11.21 TYPE 2 DIABETES MELLITUS WITH DIABETIC NEPHROPATHY, WITHOUT LONG-TERM CURRENT USE OF INSULIN (HCC): ICD-10-CM

## 2022-04-19 RX ORDER — DULAGLUTIDE 3 MG/.5ML
INJECTION, SOLUTION SUBCUTANEOUS
Qty: 4 EACH | Refills: 0 | Status: SHIPPED | OUTPATIENT
Start: 2022-04-19 | End: 2022-06-27

## 2022-07-07 DIAGNOSIS — I10 BENIGN ESSENTIAL HTN: ICD-10-CM

## 2022-07-08 RX ORDER — LOSARTAN POTASSIUM 50 MG/1
TABLET ORAL
Qty: 90 TABLET | Refills: 1 | Status: SHIPPED | OUTPATIENT
Start: 2022-07-08 | End: 2022-10-17 | Stop reason: SDUPTHER

## 2022-10-17 ENCOUNTER — OFFICE VISIT (OUTPATIENT)
Dept: FAMILY MEDICINE CLINIC | Age: 77
End: 2022-10-17
Payer: MEDICARE

## 2022-10-17 VITALS
HEIGHT: 64 IN | OXYGEN SATURATION: 96 % | BODY MASS INDEX: 27.79 KG/M2 | RESPIRATION RATE: 16 BRPM | SYSTOLIC BLOOD PRESSURE: 166 MMHG | DIASTOLIC BLOOD PRESSURE: 82 MMHG | HEART RATE: 80 BPM | TEMPERATURE: 96.4 F | WEIGHT: 162.8 LBS

## 2022-10-17 DIAGNOSIS — I10 BENIGN ESSENTIAL HTN: ICD-10-CM

## 2022-10-17 DIAGNOSIS — Z00.00 MEDICARE ANNUAL WELLNESS VISIT, SUBSEQUENT: ICD-10-CM

## 2022-10-17 DIAGNOSIS — E78.00 ELEVATED CHOLESTEROL: ICD-10-CM

## 2022-10-17 DIAGNOSIS — E11.21 TYPE 2 DIABETES MELLITUS WITH DIABETIC NEPHROPATHY, WITHOUT LONG-TERM CURRENT USE OF INSULIN (HCC): Primary | ICD-10-CM

## 2022-10-17 DIAGNOSIS — I10 WHITE COAT SYNDROME WITH DIAGNOSIS OF HYPERTENSION: ICD-10-CM

## 2022-10-17 PROBLEM — N18.30 CHRONIC RENAL DISEASE, STAGE III (HCC): Status: ACTIVE | Noted: 2022-10-17

## 2022-10-17 PROCEDURE — G8536 NO DOC ELDER MAL SCRN: HCPCS | Performed by: PHYSICIAN ASSISTANT

## 2022-10-17 PROCEDURE — G8754 DIAS BP LESS 90: HCPCS | Performed by: PHYSICIAN ASSISTANT

## 2022-10-17 PROCEDURE — G8753 SYS BP > OR = 140: HCPCS | Performed by: PHYSICIAN ASSISTANT

## 2022-10-17 PROCEDURE — 1123F ACP DISCUSS/DSCN MKR DOCD: CPT | Performed by: PHYSICIAN ASSISTANT

## 2022-10-17 PROCEDURE — 36415 COLL VENOUS BLD VENIPUNCTURE: CPT | Performed by: PHYSICIAN ASSISTANT

## 2022-10-17 PROCEDURE — G8432 DEP SCR NOT DOC, RNG: HCPCS | Performed by: PHYSICIAN ASSISTANT

## 2022-10-17 PROCEDURE — 1101F PT FALLS ASSESS-DOCD LE1/YR: CPT | Performed by: PHYSICIAN ASSISTANT

## 2022-10-17 PROCEDURE — 99214 OFFICE O/P EST MOD 30 MIN: CPT | Performed by: PHYSICIAN ASSISTANT

## 2022-10-17 PROCEDURE — 2022F DILAT RTA XM EVC RTNOPTHY: CPT | Performed by: PHYSICIAN ASSISTANT

## 2022-10-17 PROCEDURE — G8417 CALC BMI ABV UP PARAM F/U: HCPCS | Performed by: PHYSICIAN ASSISTANT

## 2022-10-17 PROCEDURE — G8427 DOCREV CUR MEDS BY ELIG CLIN: HCPCS | Performed by: PHYSICIAN ASSISTANT

## 2022-10-17 PROCEDURE — 3046F HEMOGLOBIN A1C LEVEL >9.0%: CPT | Performed by: PHYSICIAN ASSISTANT

## 2022-10-17 PROCEDURE — G0439 PPPS, SUBSEQ VISIT: HCPCS | Performed by: PHYSICIAN ASSISTANT

## 2022-10-17 PROCEDURE — 1090F PRES/ABSN URINE INCON ASSESS: CPT | Performed by: PHYSICIAN ASSISTANT

## 2022-10-17 PROCEDURE — G8400 PT W/DXA NO RESULTS DOC: HCPCS | Performed by: PHYSICIAN ASSISTANT

## 2022-10-17 PROCEDURE — 3017F COLORECTAL CA SCREEN DOC REV: CPT | Performed by: PHYSICIAN ASSISTANT

## 2022-10-17 RX ORDER — DULAGLUTIDE 3 MG/.5ML
0.5 INJECTION, SOLUTION SUBCUTANEOUS
Qty: 4 EACH | Refills: 5 | Status: SHIPPED | OUTPATIENT
Start: 2022-10-17 | End: 2022-11-01 | Stop reason: DRUGHIGH

## 2022-10-17 RX ORDER — LOSARTAN POTASSIUM 50 MG/1
50 TABLET ORAL DAILY
Qty: 90 TABLET | Refills: 1 | Status: SHIPPED | OUTPATIENT
Start: 2022-10-17

## 2022-10-17 RX ORDER — BLOOD-GLUCOSE SENSOR
EACH MISCELLANEOUS
Qty: 1 KIT | Refills: 0 | Status: SHIPPED | OUTPATIENT
Start: 2022-10-17

## 2022-10-17 RX ORDER — GLIPIZIDE 10 MG/1
10 TABLET ORAL 2 TIMES DAILY
Qty: 180 TABLET | Refills: 1 | Status: SHIPPED | OUTPATIENT
Start: 2022-10-17

## 2022-10-17 NOTE — PROGRESS NOTES
1. \"Have you been to the ER, urgent care clinic since your last visit? Hospitalized since your last visit? \" No    2. \"Have you seen or consulted any other health care providers outside of the [address] [place] [address] since your last visit? \" No     3. For patients aged 39-70: Has the patient had a colonoscopy / FIT/ Cologuard? Yes - no Care Gap present      If the patient is female:    4. For patients aged 41-77: Has the patient had a mammogram within the past 2 years? Yes - no Care Gap present      5. For patients aged 21-65: Has the patient had a pap smear? Yes - no Care Gap present      This is the Subsequent Medicare Annual Wellness Exam, performed 12 months or more after the Initial AWV or the last Subsequent AWV    I have reviewed the patient's medical history in detail and updated the computerized patient record. Assessment/Plan   Education and counseling provided:  Are appropriate based on today's review and evaluation    1. Medicare annual wellness visit, subsequent     Depression Risk Factor Screening     3 most recent PHQ Screens 10/17/2022   Little interest or pleasure in doing things Not at all   Feeling down, depressed, irritable, or hopeless Not at all   Total Score PHQ 2 0       Alcohol & Drug Abuse Risk Screen    Do you average more than 1 drink per night or more than 7 drinks a week:  No    On any one occasion in the past three months have you have had more than 3 drinks containing alcohol:  No          Functional Ability and Level of Safety    Hearing: Hearing is good. Activities of Daily Living: The home contains: no safety equipment. Patient does total self care      Ambulation: with no difficulty     Fall Risk:  Fall Risk Assessment, last 12 mths 10/17/2022   Able to walk? Yes   Fall in past 12 months? 1   Do you feel unsteady? 0   Are you worried about falling 0   Is the gait abnormal? 0   Number of falls in past 12 months 1   Fall with injury?  0      Abuse Screen:  Patient is not abused       Cognitive Screening    Has your family/caregiver stated any concerns about your memory: no     Cognitive Screening: N/A    Health Maintenance Due     Health Maintenance Due   Topic Date Due    COVID-19 Vaccine (1) Never done    Pneumococcal 65+ years (1 - PCV) Never done    Shingrix Vaccine Age 50> (1 of 2) Never done    Foot Exam Q1  09/10/2020    MICROALBUMIN Q1  10/14/2020    Medicare Yearly Exam  07/28/2021    Lipid Screen  03/08/2022    A1C test (Diabetic or Prediabetic)  05/01/2022    Flu Vaccine (1) Never done    Depression Screen  09/21/2022       Patient Care Team   Patient Care Team:  Danica Oscar as PCP - General (Physician Assistant)  Yvrose Núñez PA-C as PCP - [facility] Empaneled Provider    History     Patient Active Problem List   Diagnosis Code    Di[address] [address] E11.9    Proteinuria R80.9    Benign essential HTN I10    Vitamin D deficiency E55.9    Fe deficiency anemia D50.9    Second degree AV block I44.1    Tobacco abuse Z72.0    Diplopia H53.2    S/P cardiac pacemaker procedure Z95.0    Type 2 diabetes with nephropathy ([facility] [address] E11.21    Elevated cholesterol E78.00     Past Medical History:   Diagnosis Date    Diabetes ([facility] [address]     Diplopia 4/5/2017    Hypertension     Mobitz (type) II atrioventricular block 4/5/2017    Proteinuria     S/P cardiac pacemaker procedure 4/6/2017 4/6/17 Medtronic dual chamber pacemaker implant    Tobacco abuse 4/5/2017      Past Surgical History:   Procedure Laterality Date    HX APPENDECTOMY      HX CHOLECYSTECTOMY      HX COLONOSCOPY  2014    nl, q 2-3 y    HX GYN      hysterectomy    HX ORTHOPAEDIC  1979    back    HX PACEMAKER  04/06/2017    pacemaker placed on the left side     Current Outpatient Medications   Medication Sig Dispense Refill    SITagliptin (Januvia) 100 mg tablet TAKE 1 TABLET EVERY DAY 30 Tablet 0    Trulicity 3 KW/2.7 mL pnij INJECT 1 SYRINGE (0.5ML) SUBCUTANEOUSLY EVERY 7 DAYS 4 Each 0    losartan (COZAAR) 50 mg tablet TAKE 1 TABLET EVERY DAY 90 Tablet 1    insulin syringe-needle U-100 1/2 mL 32 gauge x 5/16\" syrg 3 Units by Does Not Apply route three (3) times daily (with meals). 100 Each 5    Blood-Glucose Meter monitoring kit Use for TID blood sugar checks 1 Kit 0    lancets (TRUEplus Lancets) 33 gauge misc TEST THREE TIMES DAILY 100 Lancet 11    glucose blood VI test strips (True Metrix Glucose Test Strip) strip TEST THREE TIMES DAILY 300 Strip 11    glipiZIDE (GLUCOTROL) 10 mg tablet Take 1 Tablet by mouth daily. (Patient taking differently: Take 10 mg by mouth two (2) times a day.) 90 Tablet 1    TRUE METRIX AIR GLUCOSE METER monitoring kit USE AS DIRECTED 1 Kit 0    vit G-S-W2-E-omega-3-ala-dha 250-3-50 unit,mg,unit chew Take 1 Tab by mouth daily. cholecalciferol (VITAMIN D3) 1,000 unit cap Take 1,000 Units by mouth daily. ferrous sulfate 325 mg (65 mg iron) tablet Take  by mouth Daily (before breakfast). vitamin E (AQUA GEMS) 400 unit capsule Take 400 Units by mouth daily. ascorbic acid (VITAMIN C) 500 mg tablet Take 500 mg by mouth daily. multivitamin (ONE A DAY) tablet Take 1 Tab by mouth daily. cyanocobalamin 1,000 mcg tablet Take 1,000 mcg by mouth daily. insulin regular (HumuLIN R Regular U-100 Insuln) 100 unit/mL injection Start BY INJECTING 3 units WITH each meal or as directed. Avoid skipping meals. Hold for low sugar readings and notify provider asap.  (Patient not taking: Reported on 10/17/2022) 3 mL 0     Allergies   Allergen Reactions    Codeine Hives and Shortness of Breath     Throat closes shut    Flowers Anaphylaxis     roses    Keflex [Cephalexin] Hives and Shortness of Breath     Throat closes shut    Pcn [Penicillins] Hives and Shortness of Breath     Throat closes shut    Metformin Diarrhea    Ciprofloxacin Shortness of Breath    Morphine Angioedema     Throat swells    Adhesive Tape-Silicones Rash       Family History   Problem Relation Age of Onset    COPD Mother Asthma Mother     Heart Disease Father     Cancer Maternal Grandmother     Stroke Maternal Grandmother     Cancer Maternal Grandfather     Cancer Paternal Grandmother     Heart Disease Paternal Grandfather      Social History     Tobacco Use    Smoking status: Former     Packs/day: 0.50     Types: Cigarettes    Smokeless tobacco: Never    Tobacco comments:     some days less   Substance Use Topics    Alcohol use: Yes     Comment: rare         Functional Ability:   Does the patient exhibit a steady gait? yes   How long did it take the patient to get up and walk from a sitting position? 2 seconds   Is the patient self reliant?  (ie can do own laundry, meals, household chores)  yes     Does the patient handle his/her own medications? yes     Does the patient handle his/her own money? yes     Is the patients home safe (ie good lighting, handrails on stairs and bath, etc.)? yes     Did you notice or did patient express any hearing difficulties? no     Did you notice or did patient express any vision difficulties?   no     Were distance and reading eye charts used? no       Advance Care Planning:   Patient was offered the opportunity to discuss advance care planning:  yes     Does patient have an Advance Directive:  no   If no, did you provide information on Caring Connections?   yes     ADL Assessment 3/8/2021   Feeding yourself No Help Needed   Getting from bed to chair No Help Needed   Getting dressed No Help Needed   Bathing or showering No Help Needed   Walk across the room (includes cane/walker) No Help Needed   Using the telphone No Help Needed   Taking your medications No Help Needed   Preparing meals No Help Needed   Managing money (expenses/bills) No Help Needed   Moderately strenuous housework (laundry) No Help Needed   Shopping for personal items (toiletries/medicines) No Help Needed   Shopping for groceries No Help Needed   Driving No Help Needed   Climbing a flight of stairs No Help Needed   Getting to places beyond walking distances No Help Needed       Abuse Screening Questionnaire 3/8/2021   Do you ever feel afraid of your partner? N   Are you in a relationship with someone who physically or mentally threatens you? N   Is it safe for you to go home?  Nyla Young RN

## 2022-10-17 NOTE — PROGRESS NOTES
Flaquito Kenney is a 78 y.o. female who presents to the office today with the following:  Chief Complaint   Patient presents with    Diabetes     Follow up    Annual Wellness Visit            Diabetes  Pt presents for routine f/up, labs, and refills. Is not fasting. HTN  Checks at home. Says \"good at home always high here\"  Cannot give numbers. No cardiac complaints. T2DM  Pt states she stopped insulin because she was having \"splotches on my skin and tongue swelling\"  Only occasionally checks her BS. Says she notes between . Admits has been high but is not interested in any further intervention and had to be convinced to even get blood work from her today. Says she does not like poking her finger to check her BS and interested in monitor. Lab Results   Component Value Date/Time    Hemoglobin A1c 8.0 (H) 09/21/2021 08:04 AM    Hemoglobin A1c, External 9.6 02/01/2022 12:00 AM     Not on statin therapy. Pt does not want to take anything else. Lab Results   Component Value Date/Time    Cholesterol, total 171 03/08/2021 07:48 PM    HDL Cholesterol 59 03/08/2021 07:48 PM    LDL, calculated 94 03/08/2021 07:48 PM    VLDL, calculated 18 03/08/2021 07:48 PM    Triglyceride 90 03/08/2021 07:48 PM    CHOL/HDL Ratio 2.9 03/08/2021 07:48 PM     Lab Results   Component Value Date/Time    ALT (SGPT) 20 03/08/2021 07:48 PM    AST (SGOT) 12 (L) 03/08/2021 07:48 PM    Alk. phosphatase 105 03/08/2021 07:48 PM    Bilirubin, total 0.3 03/08/2021 07:48 PM     Pt otherwise reports feeling well with no other complaints or acute concerns. ROS  See HPI.     Past Medical History:   Diagnosis Date    Diabetes ([address]     Diplopia 4/5/2017    Hypertension     Mobitz (type) II atrioventricular block 4/5/2017    Proteinuria     S/P cardiac pacemaker procedure 4/6/2017 4/6/17 Medtronic dual chamber pacemaker implant    Tobacco abuse 4/5/2017       Past Surgical History:   Procedure Laterality Date    HX APPENDECTOMY      HX CHOLECYSTECTOMY      HX COLONOSCOPY  2014    nl, q 2-3 y    HX GYN      hysterectomy    HX ORTHOPAEDIC  1979    back    HX PACEMAKER  04/06/2017    pacemaker placed on the left side       Allergies   Allergen Reactions    Codeine Hives and Shortness of Breath     Throat closes shut    Flowers Anaphylaxis     roses    Keflex [Cephalexin] Hives and Shortness of Breath     Throat closes shut    Pcn [Penicillins] Hives and Shortness of Breath     Throat closes shut    Metformin Diarrhea    Ciprofloxacin Shortness of Breath    Morphine Angioedema     Throat swells    Adhesive Tape-Silicones Rash       Current Outpatient Medications   Medication Sig    flash glucose sensor (FreeStyle Jerri 3 Sensor) kit Use as directed. glipiZIDE (GLUCOTROL) 10 mg tablet Take 1 Tablet by mouth two (2) times a day. SITagliptin (Januvia) 100 mg tablet TAKE 1 TABLET EVERY DAY    dulaglutide (Trulicity) 3 OP/2.0 mL pnij 0.5 mL by SubCUTAneous route every seven (7) days. losartan (COZAAR) 50 mg tablet Take 1 Tablet by mouth daily. flash glucose sensor (FreeStyle Jerri 3 Sensor) kit Use as directed for blood sugar monitoring. insulin syringe-needle U-100 1/2 mL 32 gauge x 5/16\" syrg 3 Units by Does Not Apply route three (3) times daily (with meals). Blood-Glucose Meter monitoring kit Use for TID blood sugar checks    lancets (TRUEplus Lancets) 33 gauge misc TEST THREE TIMES DAILY    glucose blood VI test strips (True Metrix Glucose Test Strip) strip TEST THREE TIMES DAILY    TRUE METRIX AIR GLUCOSE METER monitoring kit USE AS DIRECTED    vit R-S-J6-E-omega-3-ala-dha 250-3-50 unit,mg,unit chew Take 1 Tab by mouth daily. cholecalciferol (VITAMIN D3) 1,000 unit cap Take 1,000 Units by mouth daily. ferrous sulfate 325 mg (65 mg iron) tablet Take  by mouth Daily (before breakfast). vitamin E (AQUA GEMS) 400 unit capsule Take 400 Units by mouth daily.     ascorbic acid (VITAMIN C) 500 mg tablet Take 500 mg by mouth daily. multivitamin (ONE A DAY) tablet Take 1 Tab by mouth daily. cyanocobalamin 1,000 mcg tablet Take 1,000 mcg by mouth daily. No current facility-administered medications for this visit. Social History     Socioeconomic History    Marital status: SINGLE   Tobacco Use    Smoking status: Former     Packs/day: 0.50     Types: Cigarettes    Smokeless tobacco: Never    Tobacco comments:     some days less   Substance and Sexual Activity    Alcohol use: Yes     Comment: rare    Drug use: No    Sexual activity: Not Currently     Partners: Male       Family History   Problem Relation Age of Onset    COPD Mother     Asthma Mother     Heart Disease Father     Cancer Maternal Grandmother     Stroke Maternal Grandmother     Cancer Maternal Grandfather     Cancer Paternal Grandmother     Heart Disease Paternal Grandfather          Physical Exam:  Visit Vitals  BP (!) 166/82 (BP 1 Location: Right arm, BP Patient Position: Sitting, BP Cuff Size: Adult)   Pulse 80   Temp (!) 96.4 °F (35.8 °C) (Temporal)   Resp 16   Ht 5' 4\" (1.626 m)   Wt 162 lb 12.8 oz (73.8 kg)   SpO2 96%   BMI 27.94 kg/m²     Physical Exam  Vitals and nursing note reviewed. Constitutional:       Appearance: Normal appearance. HENT:      Head: Normocephalic and atraumatic. Eyes:      Conjunctiva/sclera: Conjunctivae normal.   Cardiovascular:      Rate and Rhythm: Normal rate and regular rhythm. Pulses: Normal pulses. Heart sounds: Normal heart sounds. Pulmonary:      Effort: Pulmonary effort is normal.      Breath sounds: Normal breath sounds. Musculoskeletal:         General: No swelling. Cervical back: Neck supple. Comments: No ulcers, normal sensation. No calluses. Normal pulses. Skin:     General: Skin is warm and dry. Neurological:      Mental Status: She is alert and oriented to person, place, and time.    Psychiatric:         Mood and Affect: Mood normal.       Assessment/Plan:    ICD-10-CM ICD-9-CM    1. Type 2 diabetes mellitus with diabetic nephropathy, without long-term current use of insulin ([facility])  G33.28 734.84 METABOLIC PANEL, BASIC     583.81 LIPID PANEL      HEMOGLOBIN A1C WITH EAG      MICROALBUMIN, UR, RAND W/ MICROALB/CREAT RATIO       DIABETES FOOT EXAM      flash glucose sensor (FreeStyle Jerri 3 Sensor) kit      glipiZIDE (GLUCOTROL) 10 mg tablet      SITagliptin (Januvia) 100 mg tablet      dulaglutide (Trulicity) 3 OI/3.3 mL pnij      losartan (COZAAR) 50 mg tablet      METABOLIC PANEL, BASIC      LIPID PANEL      HEMOGLOBIN A1C WITH EAG      MICROALBUMIN, UR, RAND W/ MICROALB/CREAT RATIO      flash glucose sensor (FreeStyle Jerri 3 Sensor) kit      COLLECTION VENOUS BLOOD,VENIPUNCTURE      2. Elevated cholesterol  E78.00 272.0 LIPID PANEL      LIPID PANEL      3. Benign essential HTN  I10 401.1 losartan (COZAAR) 50 mg tablet      4. Medicare annual wellness visit, subsequent  Z00.00 V70.0           Will notify patient of lab results and any further recommendations. Likely 3-6 mo f/up but pt known for being noncompliant with this. Continue home BP and BS monitoring, LMs, notify any concerning readings. Seek care in interim for any new sxs or other concerns. Pt verbalizes understanding and agrees with the plan.     Isis Llamas PA-C

## 2022-10-17 NOTE — PROGRESS NOTES
Successful venipuncture in patient's right AC X 1 sticks. The patient tolerated this procedure w/o c/o pain or discomfort.

## 2022-10-17 NOTE — PATIENT INSTRUCTIONS
Medicare Wellness Visit, Female     The best way to live healthy is to have a lifestyle where you eat a well-balanced diet, exercise regularly, limit alcohol use, and quit all forms of tobacco/nicotine, if applicable. Regular preventive services are another way to keep healthy. Preventive services (vaccines, screening tests, monitoring & exams) can help personalize your care plan, which helps you manage your own care. Screening tests can find health problems at the earliest stages, when they are easiest to treat. [facility] follows the current, evidence-based guidelines published by the [facility] Webb ([facility]) when recommending preventive services for our patients. Because we follow these guidelines, sometimes recommendations change over time as research supports it. (For example, mammograms used to be recommended annually. Even though Medicare will still pay for an annual mammogram, the newer guidelines recommend a mammogram every two years for women of average risk). Of course, you and your doctor may decide to screen more often for some diseases, based on your risk and your co-morbidities (chronic disease you are already diagnosed with). Preventive services for you include:  - Medicare offers their members a free annual wellness visit, which is time for you and your primary care provider to discuss and plan for your preventive service needs. Take advantage of this benefit every year!  -All adults over the age of 72 should receive the recommended pneumonia vaccines. Current USPSTF guidelines recommend a series of two vaccines for the best pneumonia protection.   -All adults should have a flu vaccine yearly and a tetanus vaccine every 10 years.   -All adults age 48 and older should receive the shingles vaccines (series of two vaccines).       -All adults age 38-68 who are overweight should have a diabetes screening test once every three years.   -All adults born between [address] and 1963 should be screened once for Hepatitis C.  -Other screening tests and preventive services for persons with diabetes include: an eye exam to screen for diabetic retinopathy, a kidney function test, a foot exam, and stricter control over your cholesterol.   -Cardiovascular screening for adults with routine risk involves an electrocardiogram (ECG) at intervals determined by your doctor.   -Colorectal cancer screenings should be done for adults age 54-65 with no increased risk factors for colorectal cancer. There are a number of acceptable methods of screening for this type of cancer. Each test has its own benefits and drawbacks. Discuss with your doctor what is most appropriate for you during your annual wellness visit. The different tests include: colonoscopy (considered the best screening method), a fecal occult blood test, a fecal DNA test, and sigmoidoscopy.    -A bone mass density test is recommended when a woman turns 65 to screen for osteoporosis. This test is only recommended one time, as a screening. Some providers will use this same test as a disease monitoring tool if you already have osteoporosis. -Breast cancer screenings are recommended every other year for women of normal risk, age 54-69.  -Cervical cancer screenings for women over age 72 are only recommended with certain risk factors.      Here is a list of your current Health Maintenance items (your personalized list of preventive services) with a due date:  Health Maintenance Due   Topic Date Due    COVID-19 Vaccine (1) Never done    Pneumococcal Vaccine (1 - PCV) Never done    Shingles Vaccine (1 of 2) Never done    Diabetic Foot Care  09/10/2020    Albumin Urine Test  10/14/2020    Annual Well Visit  07/28/2021    Cholesterol Test   03/08/2022    Hemoglobin A1C    05/01/2022    Yearly Flu Vaccine (1) Never done    Depresssion Screening  09/21/2022

## 2022-10-19 LAB
BUN SERPL-MCNC: 19 MG/DL (ref 8–27)
BUN/CREAT SERPL: 17 (ref 12–28)
CALCIUM SERPL-MCNC: 10.6 MG/DL (ref 8.7–10.3)
CHLORIDE SERPL-SCNC: 102 MMOL/L (ref 96–106)
CHOLEST SERPL-MCNC: 185 MG/DL (ref 100–199)
CO2 SERPL-SCNC: 21 MMOL/L (ref 20–29)
CREAT SERPL-MCNC: 1.12 MG/DL (ref 0.57–1)
EGFR: 51 ML/MIN/1.73
EST. AVERAGE GLUCOSE BLD GHB EST-MCNC: 194 MG/DL
GLUCOSE SERPL-MCNC: 131 MG/DL (ref 70–99)
HBA1C MFR BLD: 8.4 % (ref 4.8–5.6)
HDLC SERPL-MCNC: 56 MG/DL
IMP & REVIEW OF LAB RESULTS: NORMAL
INTERPRETATION: NORMAL
LDLC SERPL CALC-MCNC: 109 MG/DL (ref 0–99)
POTASSIUM SERPL-SCNC: 4.9 MMOL/L (ref 3.5–5.2)
SODIUM SERPL-SCNC: 141 MMOL/L (ref 134–144)
TRIGL SERPL-MCNC: 109 MG/DL (ref 0–149)
VLDLC SERPL CALC-MCNC: 20 MG/DL (ref 5–40)

## 2022-10-24 NOTE — PROGRESS NOTES
Call pt, the kidney tests are slightly up, but overall stable  Avoid NSAIDS  The electrolytes are ok  The Chol is a little higher, avoid Chol rich foods  The HbA1C is 8.4, higher than before again  F/U with [facility] Right next week

## 2022-11-01 RX ORDER — DULAGLUTIDE 4.5 MG/.5ML
0.5 INJECTION, SOLUTION SUBCUTANEOUS
Qty: 4 EACH | Refills: 5 | Status: SHIPPED | OUTPATIENT
Start: 2022-11-01

## 2022-11-01 NOTE — PROGRESS NOTES
Notify pt I sent a new dulaglutide Rx at a slightly higher dose to replace current. I would like her to f/up in 2 weeks with BS log. She may do a telephone visit if she cannot come in in person.

## 2022-11-09 NOTE — PROGRESS NOTES
I have called and talked to this patient. I have verified the patient's name and . I have discussed the lab results and recommendations from CHEKO Hewitt. Patient verbalizes understanding at this time.

## 2022-12-09 DIAGNOSIS — I10 BENIGN ESSENTIAL HTN: ICD-10-CM

## 2022-12-09 DIAGNOSIS — E11.21 TYPE 2 DIABETES MELLITUS WITH DIABETIC NEPHROPATHY, WITHOUT LONG-TERM CURRENT USE OF INSULIN (HCC): ICD-10-CM

## 2022-12-09 RX ORDER — LOSARTAN POTASSIUM 50 MG/1
50 TABLET ORAL DAILY
Qty: 10 TABLET | Refills: 0 | Status: SHIPPED | OUTPATIENT
Start: 2022-12-09

## 2022-12-09 NOTE — TELEPHONE ENCOUNTER
Requested Prescriptions     Pending Prescriptions Disp Refills    losartan (COZAAR) 50 mg tablet 90 Tablet 1     Sig: Take 1 Tablet by mouth daily. Will you please send a one month supply to the [facility] for this patient. She is completely out.   Thank you

## 2023-03-28 ENCOUNTER — OFFICE VISIT (OUTPATIENT)
Dept: FAMILY MEDICINE CLINIC | Age: 78
End: 2023-03-28
Payer: MEDICARE

## 2023-03-28 VITALS
HEART RATE: 81 BPM | DIASTOLIC BLOOD PRESSURE: 98 MMHG | WEIGHT: 160 LBS | TEMPERATURE: 97.9 F | HEIGHT: 63 IN | OXYGEN SATURATION: 98 % | BODY MASS INDEX: 28.35 KG/M2 | SYSTOLIC BLOOD PRESSURE: 142 MMHG | RESPIRATION RATE: 12 BRPM

## 2023-03-28 DIAGNOSIS — I10 BENIGN ESSENTIAL HTN: ICD-10-CM

## 2023-03-28 DIAGNOSIS — E11.21 TYPE 2 DIABETES MELLITUS WITH DIABETIC NEPHROPATHY, WITHOUT LONG-TERM CURRENT USE OF INSULIN (HCC): ICD-10-CM

## 2023-03-28 PROCEDURE — 1090F PRES/ABSN URINE INCON ASSESS: CPT | Performed by: PHYSICIAN ASSISTANT

## 2023-03-28 PROCEDURE — 3079F DIAST BP 80-89 MM HG: CPT | Performed by: PHYSICIAN ASSISTANT

## 2023-03-28 PROCEDURE — G8427 DOCREV CUR MEDS BY ELIG CLIN: HCPCS | Performed by: PHYSICIAN ASSISTANT

## 2023-03-28 PROCEDURE — 1101F PT FALLS ASSESS-DOCD LE1/YR: CPT | Performed by: PHYSICIAN ASSISTANT

## 2023-03-28 PROCEDURE — 36415 COLL VENOUS BLD VENIPUNCTURE: CPT | Performed by: PHYSICIAN ASSISTANT

## 2023-03-28 PROCEDURE — 3046F HEMOGLOBIN A1C LEVEL >9.0%: CPT | Performed by: PHYSICIAN ASSISTANT

## 2023-03-28 PROCEDURE — 99214 OFFICE O/P EST MOD 30 MIN: CPT | Performed by: PHYSICIAN ASSISTANT

## 2023-03-28 PROCEDURE — 3077F SYST BP >= 140 MM HG: CPT | Performed by: PHYSICIAN ASSISTANT

## 2023-03-28 PROCEDURE — G8432 DEP SCR NOT DOC, RNG: HCPCS | Performed by: PHYSICIAN ASSISTANT

## 2023-03-28 PROCEDURE — G8400 PT W/DXA NO RESULTS DOC: HCPCS | Performed by: PHYSICIAN ASSISTANT

## 2023-03-28 PROCEDURE — 3017F COLORECTAL CA SCREEN DOC REV: CPT | Performed by: PHYSICIAN ASSISTANT

## 2023-03-28 PROCEDURE — 1123F ACP DISCUSS/DSCN MKR DOCD: CPT | Performed by: PHYSICIAN ASSISTANT

## 2023-03-28 PROCEDURE — 2022F DILAT RTA XM EVC RTNOPTHY: CPT | Performed by: PHYSICIAN ASSISTANT

## 2023-03-28 PROCEDURE — G8536 NO DOC ELDER MAL SCRN: HCPCS | Performed by: PHYSICIAN ASSISTANT

## 2023-03-28 PROCEDURE — G8417 CALC BMI ABV UP PARAM F/U: HCPCS | Performed by: PHYSICIAN ASSISTANT

## 2023-03-28 RX ORDER — LOSARTAN POTASSIUM 50 MG/1
50 TABLET ORAL DAILY
Qty: 10 TABLET | Refills: 0 | Status: SHIPPED | OUTPATIENT
Start: 2023-03-28

## 2023-03-28 RX ORDER — GLIPIZIDE 10 MG/1
10 TABLET ORAL 2 TIMES DAILY
Qty: 20 TABLET | Refills: 0 | Status: SHIPPED | OUTPATIENT
Start: 2023-03-28

## 2023-03-28 RX ORDER — GLIPIZIDE 10 MG/1
10 TABLET ORAL 2 TIMES DAILY
Qty: 180 TABLET | Refills: 1 | Status: SHIPPED | OUTPATIENT
Start: 2023-03-28 | End: 2023-03-28 | Stop reason: SDUPTHER

## 2023-03-28 RX ORDER — LOSARTAN POTASSIUM 50 MG/1
50 TABLET ORAL DAILY
Qty: 90 TABLET | Refills: 1 | Status: SHIPPED | OUTPATIENT
Start: 2023-03-28 | End: 2023-03-28 | Stop reason: SDUPTHER

## 2023-03-28 RX ORDER — DULAGLUTIDE 4.5 MG/.5ML
0.5 INJECTION, SOLUTION SUBCUTANEOUS
Qty: 4 EACH | Refills: 5 | Status: SHIPPED | OUTPATIENT
Start: 2023-03-28

## 2023-03-28 NOTE — PROGRESS NOTES
Art Dior is a 78 y.o. female who presents to the office today with the following:  Chief Complaint   Patient presents with    Diabetes     Follow up       Diabetes  T2DM  Doing well. Pt says \"I don't know what to say, I feel so good! \"  Rarely checks her BS. Lab Results   Component Value Date/Time    Hemoglobin A1c 8.4 (H) 10/17/2022 12:00 PM    Hemoglobin A1c, External 9.6 02/01/2022 12:00 AM   Says feet feel fine. Plans for eye exam.  Trulicity increased last visit, pt did not return for f/up with BS readings as directed after that. HTN  Checks at home at times. Says it fluctuates but \"pretty good at home\"  Says she checks it regularly, but not really certain of the numbers, she thinks was 110s/80s recently. Pt says not usually this high at home. No cardiac or neuro complaints. Is out of all meds, needs temporary refill to local pharmacy. Lab Results   Component Value Date/Time    Cholesterol, total 185 10/17/2022 12:00 PM    HDL Cholesterol 56 10/17/2022 12:00 PM    LDL, calculated 109 (H) 10/17/2022 12:00 PM    LDL, calculated 94 03/08/2021 07:48 PM    VLDL, calculated 20 10/17/2022 12:00 PM    VLDL, calculated 18 03/08/2021 07:48 PM    Triglyceride 109 10/17/2022 12:00 PM    CHOL/HDL Ratio 2.9 03/08/2021 07:48 PM     Pt otherwise reports feeling well with no other complaints or acute concerns. ROS  See HPI.     Past Medical History:   Diagnosis Date    Diabetes ([address]     Diplopia 4/5/2017    Hypertension     Mobitz (type) II atrioventricular block 4/5/2017    Proteinuria     S/P cardiac pacemaker procedure 4/6/2017 4/6/17 Medtronic dual chamber pacemaker implant    Tobacco abuse 4/5/2017       Past Surgical History:   Procedure Laterality Date    HX APPENDECTOMY      HX CHOLECYSTECTOMY      HX COLONOSCOPY  2014    nl, q 2-3 y    HX GYN      hysterectomy    HX ORTHOPAEDIC  1979    back    HX PACEMAKER  04/06/2017    pacemaker placed on the left side       Allergies   Allergen Reactions    Codeine Hives and Shortness of Breath     Throat closes shut    Flowers Anaphylaxis     roses    Keflex [Cephalexin] Hives and Shortness of Breath     Throat closes shut    Pcn [Penicillins] Hives and Shortness of Breath     Throat closes shut    Metformin Diarrhea    Ciprofloxacin Shortness of Breath    Morphine Angioedema     Throat swells    Adhesive Tape-Silicones Rash       Current Outpatient Medications   Medication Sig    dulaglutide (Trulicity) 4.5 HB/3.0 mL pnij 0.5 mL by SubCUTAneous route every seven (7) days. losartan (COZAAR) 50 mg tablet Take 1 Tablet by mouth daily. glipiZIDE (GLUCOTROL) 10 mg tablet Take 1 Tablet by mouth two (2) times a day. SITagliptin phosphate (Januvia) 100 mg tablet TAKE 1 TABLET EVERY DAY    flash glucose sensor (FreeStyle Jerri 3 Sensor) kit Use as directed. flash glucose sensor (FreeStyle Jerri 3 Sensor) kit Use as directed for blood sugar monitoring. insulin syringe-needle U-100 1/2 mL 32 gauge x 5/16\" syrg 3 Units by Does Not Apply route three (3) times daily (with meals). Blood-Glucose Meter monitoring kit Use for TID blood sugar checks    lancets (TRUEplus Lancets) 33 gauge misc TEST THREE TIMES DAILY    glucose blood VI test strips (True Metrix Glucose Test Strip) strip TEST THREE TIMES DAILY    TRUE METRIX AIR GLUCOSE METER monitoring kit USE AS DIRECTED    vit S-A-H7-E-omega-3-ala-dha 250-3-50 unit,mg,unit chew Take 1 Tab by mouth daily. cholecalciferol (VITAMIN D3) 1,000 unit cap Take 1,000 Units by mouth daily. ferrous sulfate 325 mg (65 mg iron) tablet Take  by mouth Daily (before breakfast). vitamin E (AQUA GEMS) 400 unit capsule Take 400 Units by mouth daily. ascorbic acid (VITAMIN C) 500 mg tablet Take 500 mg by mouth daily. multivitamin (ONE A DAY) tablet Take 1 Tab by mouth daily. cyanocobalamin 1,000 mcg tablet Take 1,000 mcg by mouth daily. No current facility-administered medications for this visit. Social History     Socioeconomic History    Marital status: SINGLE   Tobacco Use    Smoking status: Former     Packs/day: 0.50     Types: Cigarettes    Smokeless tobacco: Never    Tobacco comments:     some days less   Substance and Sexual Activity    Alcohol use: Yes     Comment: rare    Drug use: No    Sexual activity: Not Currently     Partners: Male     Social Determinants of Health     Financial Resource Strain: Low Risk     Difficulty of Paying Living Expenses: Not very hard   Food Insecurity: No Food Insecurity    Worried About Running Out of Food in the Last Year: Never true    Ran Out of Food in the Last Year: Never true   Transportation Needs: Unmet Transportation Needs    Lack of Transportation (Medical): Yes    Lack of Transportation (Non-Medical): Yes   Housing Stability: Unknown    Unable to Pay for Housing in the Last Year: No    Unstable Housing in the Last Year: No       Family History   Problem Relation Age of Onset    COPD Mother     Asthma Mother     Heart Disease Father     Cancer Maternal Grandmother     Stroke Maternal Grandmother     Cancer Maternal Grandfather     Cancer Paternal Grandmother     Heart Disease Paternal Grandfather          Physical Exam:  Visit Vitals  BP (!) 142/98 (BP 1 Location: Right arm, BP Patient Position: Sitting, BP Cuff Size: Adult)   Pulse 81   Temp 97.9 °F (36.6 °C)   Resp 12   Ht 5' 3\" (1.6 m)   Wt 160 lb (72.6 kg)   SpO2 98%   BMI 28.34 kg/m²     Physical Exam  Vitals and nursing note reviewed. Constitutional:       Appearance: Normal appearance. HENT:      Head: Normocephalic and atraumatic. Eyes:      Conjunctiva/sclera: Conjunctivae normal.   Cardiovascular:      Rate and Rhythm: Normal rate and regular rhythm. Pulses: Normal pulses. Heart sounds: Normal heart sounds. Pulmonary:      Effort: Pulmonary effort is normal.      Breath sounds: Normal breath sounds. Musculoskeletal:         General: No swelling.       Cervical back: Neck supple. Skin:     General: Skin is warm and dry. Neurological:      General: No focal deficit present. Mental Status: She is alert and oriented to person, place, and time. Mental status is at baseline. Psychiatric:         Mood and Affect: Mood normal.         Behavior: Behavior normal.       Assessment/Plan:    ICD-10-CM ICD-9-CM    1. Benign essential HTN  I10 401.1 losartan (COZAAR) 50 mg tablet      CBC WITH AUTOMATED DIFF      METABOLIC PANEL, COMPREHENSIVE      LIPID PANEL      MICROALBUMIN, UR, RAND W/ MICROALB/CREAT RATIO      CBC WITH AUTOMATED DIFF      METABOLIC PANEL, COMPREHENSIVE      LIPID PANEL      MICROALBUMIN, UR, RAND W/ MICROALB/CREAT RATIO      KS COLLECTION VENOUS BLOOD VENIPUNCTURE      DISCONTINUED: losartan (COZAAR) 50 mg tablet      2. Type 2 diabetes mellitus with diabetic nephropathy, without long-term current use of insulin ([facility])  E11.21 250.40 dulaglutide (Trulicity) 4.5 VV/2.8 mL pnij     583.81 losartan (COZAAR) 50 mg tablet      glipiZIDE (GLUCOTROL) 10 mg tablet      SITagliptin phosphate (Januvia) 100 mg tablet      CBC WITH AUTOMATED DIFF      METABOLIC PANEL, COMPREHENSIVE      LIPID PANEL      HEMOGLOBIN A1C WITH EAG      MICROALBUMIN, UR, RAND W/ MICROALB/CREAT RATIO      CBC WITH AUTOMATED DIFF      METABOLIC PANEL, COMPREHENSIVE      LIPID PANEL      HEMOGLOBIN A1C WITH EAG      MICROALBUMIN, UR, RAND W/ MICROALB/CREAT RATIO      DISCONTINUED: losartan (COZAAR) 50 mg tablet      DISCONTINUED: glipiZIDE (GLUCOTROL) 10 mg tablet      DISCONTINUED: SITagliptin phosphate (Januvia) 100 mg tablet          pt doing very well. \"I feel great! \"  Encouraged to continue LMs and monitoring BP and more often BS at home. Report any concerning readings. Will notify patient of lab results and any further recommendations. Routine 6 mo f/up. Seek care in interim for any new sxs or other concerns. Pt verbalizes understanding and agrees with the plan.     Huntre Russo, CHEKO

## 2023-03-28 NOTE — PROGRESS NOTES
YES Answers must have Comments  1. \"Have you been to the ER, urgent care clinic since your last visit? Hospitalized since your last visit? \"    [] YES   [x] NO       2. Have you seen or consulted any other health care providers outside of [address] [place] [address] since your last visit?     [] YES   [x] NO       3. For patients aged 39-70: Have you had a colorectal cancer screening such as a colonoscopy/FIT/Cologuard? Nurse/CMA to request records if not in chart   [] YES   [x] NO   [] NA, based on age    If the patient is female:      4. For female patients aged 41-77: Celestino Power you had a mammogram in the last two years?  Nurse/CMA to request records if not in chart   [] YES   [x] NO   [] NA, based on age    11. For female patients aged 21-65: Celestino Power you had a pap smear?   Nurse/CMA to request records if not in chart   [] YES   [x] NO  [] NA, based on age

## 2023-03-31 LAB
ALBUMIN SERPL-MCNC: 4.4 G/DL (ref 3.7–4.7)
ALBUMIN/CREAT UR: 783 MG/G CREAT (ref 0–29)
ALBUMIN/GLOB SERPL: 1.8 {RATIO} (ref 1.2–2.2)
ALP SERPL-CCNC: 128 IU/L (ref 44–121)
ALT SERPL-CCNC: 16 IU/L (ref 0–32)
AST SERPL-CCNC: 19 IU/L (ref 0–40)
BASOPHILS # BLD AUTO: 0 X10E3/UL (ref 0–0.2)
BASOPHILS NFR BLD AUTO: 1 %
BILIRUB SERPL-MCNC: 0.4 MG/DL (ref 0–1.2)
BUN SERPL-MCNC: 30 MG/DL (ref 8–27)
BUN/CREAT SERPL: 22 (ref 12–28)
CALCIUM SERPL-MCNC: 10.1 MG/DL (ref 8.7–10.3)
CHLORIDE SERPL-SCNC: 97 MMOL/L (ref 96–106)
CHOLEST SERPL-MCNC: 196 MG/DL (ref 100–199)
CO2 SERPL-SCNC: 22 MMOL/L (ref 20–29)
CREAT SERPL-MCNC: 1.36 MG/DL (ref 0.57–1)
CREAT UR-MCNC: 35.2 MG/DL
EGFRCR SERPLBLD CKD-EPI 2021: 41 ML/MIN/1.73
EOSINOPHIL # BLD AUTO: 0.1 X10E3/UL (ref 0–0.4)
EOSINOPHIL NFR BLD AUTO: 2 %
ERYTHROCYTE [DISTWIDTH] IN BLOOD BY AUTOMATED COUNT: 12.6 % (ref 11.7–15.4)
EST. AVERAGE GLUCOSE BLD GHB EST-MCNC: >398 MG/DL
GLOBULIN SER CALC-MCNC: 2.4 G/DL (ref 1.5–4.5)
GLUCOSE SERPL-MCNC: 457 MG/DL (ref 70–99)
HBA1C MFR BLD: >15.5 % (ref 4.8–5.6)
HCT VFR BLD AUTO: 35.7 % (ref 34–46.6)
HDLC SERPL-MCNC: 63 MG/DL
HGB BLD-MCNC: 12 G/DL (ref 11.1–15.9)
IMM GRANULOCYTES # BLD AUTO: 0.1 X10E3/UL (ref 0–0.1)
IMM GRANULOCYTES NFR BLD AUTO: 1 %
IMP & REVIEW OF LAB RESULTS: NORMAL
LDLC SERPL CALC-MCNC: 114 MG/DL (ref 0–99)
LYMPHOCYTES # BLD AUTO: 1.7 X10E3/UL (ref 0.7–3.1)
LYMPHOCYTES NFR BLD AUTO: 23 %
MCH RBC QN AUTO: 30.8 PG (ref 26.6–33)
MCHC RBC AUTO-ENTMCNC: 33.6 G/DL (ref 31.5–35.7)
MCV RBC AUTO: 92 FL (ref 79–97)
MICROALBUMIN UR-MCNC: 275.5 UG/ML
MONOCYTES # BLD AUTO: 0.5 X10E3/UL (ref 0.1–0.9)
MONOCYTES NFR BLD AUTO: 7 %
NEUTROPHILS # BLD AUTO: 5.1 X10E3/UL (ref 1.4–7)
NEUTROPHILS NFR BLD AUTO: 66 %
PLATELET # BLD AUTO: 225 X10E3/UL (ref 150–450)
POTASSIUM SERPL-SCNC: 5.3 MMOL/L (ref 3.5–5.2)
PROT SERPL-MCNC: 6.8 G/DL (ref 6–8.5)
RBC # BLD AUTO: 3.9 X10E6/UL (ref 3.77–5.28)
REPORT: NORMAL
SODIUM SERPL-SCNC: 136 MMOL/L (ref 134–144)
TRIGL SERPL-MCNC: 108 MG/DL (ref 0–149)
VLDLC SERPL CALC-MCNC: 19 MG/DL (ref 5–40)
WBC # BLD AUTO: 7.6 X10E3/UL (ref 3.4–10.8)

## 2023-04-13 ENCOUNTER — TELEPHONE (OUTPATIENT)
Dept: FAMILY MEDICINE CLINIC | Age: 78
End: 2023-04-13

## 2023-05-19 RX ORDER — LOSARTAN POTASSIUM 50 MG/1
50 TABLET ORAL DAILY
COMMUNITY
Start: 2023-04-18

## 2023-05-19 RX ORDER — GLIPIZIDE 10 MG/1
10 TABLET ORAL 2 TIMES DAILY
COMMUNITY
Start: 2023-04-18

## 2023-05-19 RX ORDER — ASCORBIC ACID 500 MG
500 TABLET ORAL DAILY
COMMUNITY

## 2023-05-19 RX ORDER — DULAGLUTIDE 4.5 MG/.5ML
0.5 INJECTION, SOLUTION SUBCUTANEOUS
COMMUNITY
Start: 2023-04-10 | End: 2023-07-18 | Stop reason: SDUPTHER

## 2023-05-19 RX ORDER — FERROUS SULFATE 325(65) MG
TABLET ORAL
COMMUNITY

## 2023-06-29 RX ORDER — SITAGLIPTIN 100 MG/1
TABLET, FILM COATED ORAL
Qty: 30 TABLET | Refills: 0 | Status: SHIPPED | OUTPATIENT
Start: 2023-06-29 | End: 2023-07-25

## 2023-06-29 NOTE — TELEPHONE ENCOUNTER
Requested Prescriptions     Pending Prescriptions Disp Refills    JANUVIA 100 MG tablet [Pharmacy Med Name: Grace Zepeda TAB 100MG] 90 tablet 1     Sig: TAKE 1 TABLET DAILY     Last seen:  4/10/23

## 2023-07-18 ENCOUNTER — TELEPHONE (OUTPATIENT)
Dept: FAMILY MEDICINE CLINIC | Age: 78
End: 2023-07-18

## 2023-07-18 RX ORDER — DULAGLUTIDE 4.5 MG/.5ML
0.5 INJECTION, SOLUTION SUBCUTANEOUS
Qty: 4 ADJUSTABLE DOSE PRE-FILLED PEN SYRINGE | Refills: 0 | Status: SHIPPED | OUTPATIENT
Start: 2023-07-18

## 2023-07-18 NOTE — TELEPHONE ENCOUNTER
----- Message from Jeremias Lewis sent at 7/18/2023  3:46 PM EDT -----  Subject: Refill Request    QUESTIONS  Name of Medication? Dulaglutide (TRULICITY) 4.5 ZE/9.4WK SOPN  Patient-reported dosage and instructions? every 7 days   How many days do you have left? 0  [facility] [address] [facility] [phone]  Additional Information for Provider? Patient got a new number and she dont   know it and dont know the number in the house so if any questions she said   shell call back   ---------------------------------------------------------------------------  --------------  CALL BACK INFO  What is the best way for the office to contact you? Do not leave any   message, patient will call back for answer  Preferred Call Back Phone Number? [phone]  ---------------------------------------------------------------------------  --------------  SCRIPT ANSWERS  Relationship to Patient?  Self

## 2023-07-22 DIAGNOSIS — E11.21 TYPE 2 DIABETES MELLITUS WITH DIABETIC NEPHROPATHY (HCC): ICD-10-CM

## 2023-07-25 RX ORDER — SITAGLIPTIN 100 MG/1
TABLET, FILM COATED ORAL
Qty: 30 TABLET | Refills: 0 | Status: SHIPPED | OUTPATIENT
Start: 2023-07-25

## 2023-07-25 RX ORDER — DULAGLUTIDE 3 MG/.5ML
INJECTION, SOLUTION SUBCUTANEOUS
Qty: 2 ML | Refills: 0 | OUTPATIENT
Start: 2023-07-25

## 2023-10-18 ENCOUNTER — OFFICE VISIT (OUTPATIENT)
Dept: FAMILY MEDICINE CLINIC | Age: 78
End: 2023-10-18
Payer: MEDICARE

## 2023-10-18 VITALS
BODY MASS INDEX: 27.96 KG/M2 | OXYGEN SATURATION: 97 % | SYSTOLIC BLOOD PRESSURE: 158 MMHG | WEIGHT: 157.8 LBS | DIASTOLIC BLOOD PRESSURE: 80 MMHG | RESPIRATION RATE: 16 BRPM | HEIGHT: 63 IN | TEMPERATURE: 98 F | HEART RATE: 85 BPM

## 2023-10-18 DIAGNOSIS — E11.21 TYPE 2 DIABETES WITH NEPHROPATHY (HCC): ICD-10-CM

## 2023-10-18 DIAGNOSIS — Z00.00 MEDICARE ANNUAL WELLNESS VISIT, SUBSEQUENT: ICD-10-CM

## 2023-10-18 DIAGNOSIS — E11.21 TYPE 2 DIABETES MELLITUS WITH DIABETIC NEPHROPATHY, WITHOUT LONG-TERM CURRENT USE OF INSULIN (HCC): Primary | ICD-10-CM

## 2023-10-18 LAB — HBA1C MFR BLD: 14.8 %

## 2023-10-18 PROCEDURE — 1036F TOBACCO NON-USER: CPT | Performed by: FAMILY MEDICINE

## 2023-10-18 PROCEDURE — 3077F SYST BP >= 140 MM HG: CPT | Performed by: FAMILY MEDICINE

## 2023-10-18 PROCEDURE — G8484 FLU IMMUNIZE NO ADMIN: HCPCS | Performed by: FAMILY MEDICINE

## 2023-10-18 PROCEDURE — 3046F HEMOGLOBIN A1C LEVEL >9.0%: CPT | Performed by: FAMILY MEDICINE

## 2023-10-18 PROCEDURE — 99213 OFFICE O/P EST LOW 20 MIN: CPT | Performed by: FAMILY MEDICINE

## 2023-10-18 PROCEDURE — G8400 PT W/DXA NO RESULTS DOC: HCPCS | Performed by: FAMILY MEDICINE

## 2023-10-18 PROCEDURE — G0439 PPPS, SUBSEQ VISIT: HCPCS | Performed by: FAMILY MEDICINE

## 2023-10-18 PROCEDURE — 1090F PRES/ABSN URINE INCON ASSESS: CPT | Performed by: FAMILY MEDICINE

## 2023-10-18 PROCEDURE — G8427 DOCREV CUR MEDS BY ELIG CLIN: HCPCS | Performed by: FAMILY MEDICINE

## 2023-10-18 PROCEDURE — 83036 HEMOGLOBIN GLYCOSYLATED A1C: CPT | Performed by: FAMILY MEDICINE

## 2023-10-18 PROCEDURE — G8419 CALC BMI OUT NRM PARAM NOF/U: HCPCS | Performed by: FAMILY MEDICINE

## 2023-10-18 PROCEDURE — 1123F ACP DISCUSS/DSCN MKR DOCD: CPT | Performed by: FAMILY MEDICINE

## 2023-10-18 PROCEDURE — 3079F DIAST BP 80-89 MM HG: CPT | Performed by: FAMILY MEDICINE

## 2023-10-18 RX ORDER — DULAGLUTIDE 4.5 MG/.5ML
0.5 INJECTION, SOLUTION SUBCUTANEOUS
Qty: 4 ADJUSTABLE DOSE PRE-FILLED PEN SYRINGE | Refills: 3 | Status: SHIPPED | OUTPATIENT
Start: 2023-10-18

## 2023-10-18 RX ORDER — LOSARTAN POTASSIUM 50 MG/1
50 TABLET ORAL DAILY
Qty: 90 TABLET | Refills: 1 | Status: SHIPPED | OUTPATIENT
Start: 2023-10-18

## 2023-10-18 ASSESSMENT — PATIENT HEALTH QUESTIONNAIRE - PHQ9
SUM OF ALL RESPONSES TO PHQ QUESTIONS 1-9: 0
2. FEELING DOWN, DEPRESSED OR HOPELESS: 0
2. FEELING DOWN, DEPRESSED OR HOPELESS: 0
SUM OF ALL RESPONSES TO PHQ QUESTIONS 1-9: 0
1. LITTLE INTEREST OR PLEASURE IN DOING THINGS: 0
SUM OF ALL RESPONSES TO PHQ9 QUESTIONS 1 & 2: 0
SUM OF ALL RESPONSES TO PHQ9 QUESTIONS 1 & 2: 0
SUM OF ALL RESPONSES TO PHQ QUESTIONS 1-9: 0
1. LITTLE INTEREST OR PLEASURE IN DOING THINGS: 0
SUM OF ALL RESPONSES TO PHQ QUESTIONS 1-9: 0

## 2023-10-18 ASSESSMENT — LIFESTYLE VARIABLES
HOW MANY STANDARD DRINKS CONTAINING ALCOHOL DO YOU HAVE ON A TYPICAL DAY: PATIENT DOES NOT DRINK
HOW OFTEN DO YOU HAVE A DRINK CONTAINING ALCOHOL: NEVER

## 2023-10-18 NOTE — PROGRESS NOTES
Medicare Annual Wellness Visit    Spring Devlin is here for Medicare AWV (/DM follow up)    Assessment & Plan   Type 2 diabetes mellitus with diabetic nephropathy, without long-term current use of insulin (HCC)  -     AMB POC HEMOGLOBIN A1C  -     CBC with Auto Differential; Future  -     Comprehensive Metabolic Panel; Future  -     Dulaglutide (TRULICITY) 4.5 JM/7.1DT SOPN; Inject 0.5 mLs into the skin every 7 days, Disp-4 Adjustable Dose Pre-filled Pen Syringe, R-3Pt noncompliant with follow up. Last refill. Normal  -     empagliflozin (JARDIANCE) 25 MG tablet; Take 1 tablet by mouth daily, Disp-90 tablet, R-1Normal  -     losartan (COZAAR) 50 MG tablet; Take 1 tablet by mouth daily, Disp-90 tablet, R-1Normal  -     SITagliptin (JANUVIA) 100 MG tablet; Take 1 tablet by mouth daily, Disp-90 tablet, R-0Normal  Type 2 diabetes with nephropathy ([address])  Medicare annual wellness visit, subsequent    Recommendations for Preventive Services Due: see orders and patient instructions/AVS.  Recommended screening schedule for the next 5-10 years is provided to the patient in written form: see Patient Instructions/AVS.     No follow-ups on file. Subjective       Patient's complete Health Risk Assessment and screening values have been reviewed and are found in Flowsheets. The following problems were reviewed today and where indicated follow up appointments were made and/or referrals ordered. Positive Risk Factor Screenings with Interventions:       Cognitive:    Words recalled: 1 Word Recalled   Clock Drawing Test (CDT): (!) Abnormal   Total Score: (!) 1   Total Score Interpretation: Abnormal Mini-Cog      Interventions:                Dentist Screen:  Have you seen the dentist within the past year?: (!) No    Intervention:       Vision Screen:  Do you have difficulty driving, watching TV, or doing any of your daily activities because of your eyesight?: No  Have you had an eye exam within the past year?: (!) No  No

## 2023-10-19 ENCOUNTER — TELEPHONE (OUTPATIENT)
Dept: FAMILY MEDICINE CLINIC | Age: 78
End: 2023-10-19

## 2023-10-19 NOTE — TELEPHONE ENCOUNTER
----- Message from Santiago Murphy MD sent at 10/18/2023  5:35 PM EDT -----  Please call and ask her to come by and do the blood work missed today.   smg

## 2023-10-20 ENCOUNTER — NURSE ONLY (OUTPATIENT)
Dept: FAMILY MEDICINE CLINIC | Age: 78
End: 2023-10-20
Payer: MEDICARE

## 2023-10-20 DIAGNOSIS — E11.21 TYPE 2 DIABETES MELLITUS WITH DIABETIC NEPHROPATHY, WITHOUT LONG-TERM CURRENT USE OF INSULIN (HCC): ICD-10-CM

## 2023-10-20 LAB
ALBUMIN SERPL-MCNC: 3.7 G/DL (ref 3.5–5)
ALBUMIN/GLOB SERPL: 1.1 (ref 1.1–2.2)
ALP SERPL-CCNC: 136 U/L (ref 45–117)
ALT SERPL-CCNC: 22 U/L (ref 12–78)
ANION GAP SERPL CALC-SCNC: 6 MMOL/L (ref 5–15)
AST SERPL-CCNC: 10 U/L (ref 15–37)
BILIRUB SERPL-MCNC: 0.4 MG/DL (ref 0.2–1)
BUN SERPL-MCNC: 26 MG/DL (ref 6–20)
BUN/CREAT SERPL: 19 (ref 12–20)
CALCIUM SERPL-MCNC: 9.3 MG/DL (ref 8.5–10.1)
CHLORIDE SERPL-SCNC: 101 MMOL/L (ref 97–108)
CO2 SERPL-SCNC: 28 MMOL/L (ref 21–32)
CREAT SERPL-MCNC: 1.39 MG/DL (ref 0.55–1.02)
GLOBULIN SER CALC-MCNC: 3.4 G/DL (ref 2–4)
GLUCOSE SERPL-MCNC: 552 MG/DL (ref 65–100)
POTASSIUM SERPL-SCNC: 4.8 MMOL/L (ref 3.5–5.1)
PROT SERPL-MCNC: 7.1 G/DL (ref 6.4–8.2)
SODIUM SERPL-SCNC: 135 MMOL/L (ref 136–145)

## 2023-10-20 PROCEDURE — 36415 COLL VENOUS BLD VENIPUNCTURE: CPT | Performed by: FAMILY MEDICINE

## 2023-10-20 NOTE — PROGRESS NOTES
Patient presents for lab draw ordered by:    Ordering Provider: MD Nasrin      The following labs were drawn and sent to lab. CBC and CMP    The following tubes were sent:    Gold  ( 1) and Lavender  ( 1)    Patient tolerated well. Successful venipuncture in patient's Right AC X 2 sticks. The patient tolerated this procedure w/o c/o pain or discomfort.

## 2023-10-21 LAB
BASOPHILS # BLD: 0 K/UL (ref 0–0.1)
BASOPHILS NFR BLD: 1 % (ref 0–1)
DIFFERENTIAL METHOD BLD: ABNORMAL
EOSINOPHIL # BLD: 0.1 K/UL (ref 0–0.4)
EOSINOPHIL NFR BLD: 2 % (ref 0–7)
ERYTHROCYTE [DISTWIDTH] IN BLOOD BY AUTOMATED COUNT: 12.7 % (ref 11.5–14.5)
HCT VFR BLD AUTO: 35.6 % (ref 35–47)
HGB BLD-MCNC: 11.7 G/DL (ref 11.5–16)
IMM GRANULOCYTES # BLD AUTO: 0.1 K/UL (ref 0–0.04)
IMM GRANULOCYTES NFR BLD AUTO: 2 % (ref 0–0.5)
LYMPHOCYTES # BLD: 1.7 K/UL (ref 0.8–3.5)
LYMPHOCYTES NFR BLD: 21 % (ref 12–49)
MCH RBC QN AUTO: 29.6 PG (ref 26–34)
MCHC RBC AUTO-ENTMCNC: 32.9 G/DL (ref 30–36.5)
MCV RBC AUTO: 90.1 FL (ref 80–99)
MONOCYTES # BLD: 0.7 K/UL (ref 0–1)
MONOCYTES NFR BLD: 10 % (ref 5–13)
NEUTS SEG # BLD: 5.1 K/UL (ref 1.8–8)
NEUTS SEG NFR BLD: 64 % (ref 32–75)
NRBC # BLD: 0 K/UL (ref 0–0.01)
NRBC BLD-RTO: 0 PER 100 WBC
PLATELET # BLD AUTO: 214 K/UL (ref 150–400)
PMV BLD AUTO: 11.1 FL (ref 8.9–12.9)
RBC # BLD AUTO: 3.95 M/UL (ref 3.8–5.2)
WBC # BLD AUTO: 7.8 K/UL (ref 3.6–11)

## 2023-10-25 ENCOUNTER — TELEPHONE (OUTPATIENT)
Dept: FAMILY MEDICINE CLINIC | Age: 78
End: 2023-10-25

## 2023-10-25 NOTE — TELEPHONE ENCOUNTER
----- Message from Kristine Draper sent at 10/25/2023 11:31 AM EDT -----  Subject: Message to Provider    QUESTIONS  Information for Provider? pt would like to speak with Hilda, please follow   up   ---------------------------------------------------------------------------  --------------  [address]  [phone]; Do not leave any message, patient will call back for answer  ---------------------------------------------------------------------------  --------------  SCRIPT ANSWERS  Relationship to Patient?  Self

## 2023-10-25 NOTE — TELEPHONE ENCOUNTER
I have called and spoke to this patient. She reports that since she started the medications for DM, her last 2 BS reading where 174. She has found someone to bring her in on Friday, but does not know what time. I told her we will work her in when she can get here. She wants you to know that as long as her BS are running around 174 she is not interested in starting Insulin and would like to continue with just the oral medications for now if ok.

## 2023-10-27 ENCOUNTER — OFFICE VISIT (OUTPATIENT)
Dept: FAMILY MEDICINE CLINIC | Age: 78
End: 2023-10-27
Payer: MEDICARE

## 2023-10-27 VITALS
HEIGHT: 63 IN | RESPIRATION RATE: 18 BRPM | SYSTOLIC BLOOD PRESSURE: 146 MMHG | WEIGHT: 157.85 LBS | OXYGEN SATURATION: 98 % | BODY MASS INDEX: 27.97 KG/M2 | HEART RATE: 80 BPM | DIASTOLIC BLOOD PRESSURE: 78 MMHG

## 2023-10-27 DIAGNOSIS — E11.21 TYPE 2 DIABETES MELLITUS WITH DIABETIC NEPHROPATHY, WITH LONG-TERM CURRENT USE OF INSULIN (HCC): Primary | ICD-10-CM

## 2023-10-27 DIAGNOSIS — Z79.4 TYPE 2 DIABETES MELLITUS WITH DIABETIC NEPHROPATHY, WITH LONG-TERM CURRENT USE OF INSULIN (HCC): Primary | ICD-10-CM

## 2023-10-27 PROCEDURE — G8419 CALC BMI OUT NRM PARAM NOF/U: HCPCS | Performed by: FAMILY MEDICINE

## 2023-10-27 PROCEDURE — G8484 FLU IMMUNIZE NO ADMIN: HCPCS | Performed by: FAMILY MEDICINE

## 2023-10-27 PROCEDURE — G8400 PT W/DXA NO RESULTS DOC: HCPCS | Performed by: FAMILY MEDICINE

## 2023-10-27 PROCEDURE — 1090F PRES/ABSN URINE INCON ASSESS: CPT | Performed by: FAMILY MEDICINE

## 2023-10-27 PROCEDURE — G8427 DOCREV CUR MEDS BY ELIG CLIN: HCPCS | Performed by: FAMILY MEDICINE

## 2023-10-27 PROCEDURE — 3078F DIAST BP <80 MM HG: CPT | Performed by: FAMILY MEDICINE

## 2023-10-27 PROCEDURE — 99213 OFFICE O/P EST LOW 20 MIN: CPT | Performed by: FAMILY MEDICINE

## 2023-10-27 PROCEDURE — 3077F SYST BP >= 140 MM HG: CPT | Performed by: FAMILY MEDICINE

## 2023-10-27 PROCEDURE — 3046F HEMOGLOBIN A1C LEVEL >9.0%: CPT | Performed by: FAMILY MEDICINE

## 2023-10-27 PROCEDURE — 1123F ACP DISCUSS/DSCN MKR DOCD: CPT | Performed by: FAMILY MEDICINE

## 2023-10-27 PROCEDURE — 1036F TOBACCO NON-USER: CPT | Performed by: FAMILY MEDICINE

## 2023-10-27 RX ORDER — INSULIN GLARGINE 100 [IU]/ML
10 INJECTION, SOLUTION SUBCUTANEOUS NIGHTLY
Qty: 5 ADJUSTABLE DOSE PRE-FILLED PEN SYRINGE | Refills: 0 | Status: SHIPPED | OUTPATIENT
Start: 2023-10-27

## 2023-12-07 ENCOUNTER — NURSE TRIAGE (OUTPATIENT)
Dept: OTHER | Facility: CLINIC | Age: 78
End: 2023-12-07

## 2023-12-07 NOTE — TELEPHONE ENCOUNTER
Location of patient: [address] [place] [address] Henry call from Ming Garce at [facility] with Sun Number. Limited triage due to inability to speak with pt. Information provided by pt's daughter    Subjective: Caller states \"\"     Current Symptoms: BG elevated.  this morning. Pt's BG normaly 320-502. Inc thirst- drinks 2-3 gallons of water per day    Changed to insulin on 10/27 also taking dulaglutide. Pt vomits for hours after taking the dulaglutide. Stopped shot on Saturday, has not vomitted since Sunday. Also taking 3 other oral medications for her BG. Caller requesting insulin pump. Onset: ongoing ago;     Associated Symptoms:     Pain Severity: 0/10; Temperature: denies fevers     What has been tried: home medications    LMP: NA Pregnant: NA    Recommended disposition: Go to Office Now    Care advice provided, patient verbalizes understanding; denies any other questions or concerns; instructed to call back for any new or worsening symptoms. Patient/Caller agrees with recommended disposition; writer provided warm transfer to [facility] at [facility] for appointment scheduling    Attention Provider: Thank you for allowing me to participate in the care of your patient. The patient was connected to triage in response to information provided to the ECC/PSC. Please do not respond through this encounter as the response is not directed to a shared pool.       Reason for Disposition   Blood glucose > 400 mg/dL (22.2 mmol/L)    Protocols used: Diabetes - High Blood Sugar-ADULT-[place]

## 2023-12-11 ENCOUNTER — OFFICE VISIT (OUTPATIENT)
Dept: FAMILY MEDICINE CLINIC | Age: 78
End: 2023-12-11
Payer: MEDICARE

## 2023-12-11 VITALS
WEIGHT: 148 LBS | SYSTOLIC BLOOD PRESSURE: 147 MMHG | HEART RATE: 92 BPM | TEMPERATURE: 97 F | RESPIRATION RATE: 18 BRPM | DIASTOLIC BLOOD PRESSURE: 77 MMHG | BODY MASS INDEX: 26.22 KG/M2 | OXYGEN SATURATION: 97 % | HEIGHT: 63 IN

## 2023-12-11 DIAGNOSIS — E11.21 TYPE 2 DIABETES WITH NEPHROPATHY (HCC): Primary | ICD-10-CM

## 2023-12-11 DIAGNOSIS — E11.21 TYPE 2 DIABETES MELLITUS WITH DIABETIC NEPHROPATHY, WITH LONG-TERM CURRENT USE OF INSULIN (HCC): ICD-10-CM

## 2023-12-11 DIAGNOSIS — R35.0 URINARY FREQUENCY: ICD-10-CM

## 2023-12-11 DIAGNOSIS — N18.30 STAGE 3 CHRONIC KIDNEY DISEASE, UNSPECIFIED WHETHER STAGE 3A OR 3B CKD (HCC): ICD-10-CM

## 2023-12-11 DIAGNOSIS — E11.65 POORLY CONTROLLED DIABETES MELLITUS (HCC): ICD-10-CM

## 2023-12-11 DIAGNOSIS — N30.00 ACUTE CYSTITIS WITHOUT HEMATURIA: ICD-10-CM

## 2023-12-11 DIAGNOSIS — R30.0 DYSURIA: ICD-10-CM

## 2023-12-11 DIAGNOSIS — Z79.4 TYPE 2 DIABETES MELLITUS WITH DIABETIC NEPHROPATHY, WITH LONG-TERM CURRENT USE OF INSULIN (HCC): ICD-10-CM

## 2023-12-11 LAB
BILIRUBIN, URINE, POC: NEGATIVE
BLOOD URINE, POC: NORMAL
GLUCOSE URINE, POC: NEGATIVE
KETONES, URINE, POC: NORMAL
LEUKOCYTE ESTERASE, URINE, POC: NORMAL
NITRITE, URINE, POC: POSITIVE
PH, URINE, POC: 5.5 (ref 4.6–8)
PROTEIN,URINE, POC: NORMAL
SPECIFIC GRAVITY, URINE, POC: 1.01 (ref 1–1.03)
URINALYSIS CLARITY, POC: CLEAR
URINALYSIS COLOR, POC: YELLOW
UROBILINOGEN, POC: NORMAL

## 2023-12-11 PROCEDURE — 81003 URINALYSIS AUTO W/O SCOPE: CPT | Performed by: PHYSICIAN ASSISTANT

## 2023-12-11 PROCEDURE — G8419 CALC BMI OUT NRM PARAM NOF/U: HCPCS | Performed by: PHYSICIAN ASSISTANT

## 2023-12-11 PROCEDURE — 1036F TOBACCO NON-USER: CPT | Performed by: PHYSICIAN ASSISTANT

## 2023-12-11 PROCEDURE — 1123F ACP DISCUSS/DSCN MKR DOCD: CPT | Performed by: PHYSICIAN ASSISTANT

## 2023-12-11 PROCEDURE — 1090F PRES/ABSN URINE INCON ASSESS: CPT | Performed by: PHYSICIAN ASSISTANT

## 2023-12-11 PROCEDURE — G8484 FLU IMMUNIZE NO ADMIN: HCPCS | Performed by: PHYSICIAN ASSISTANT

## 2023-12-11 PROCEDURE — G8427 DOCREV CUR MEDS BY ELIG CLIN: HCPCS | Performed by: PHYSICIAN ASSISTANT

## 2023-12-11 PROCEDURE — 99214 OFFICE O/P EST MOD 30 MIN: CPT | Performed by: PHYSICIAN ASSISTANT

## 2023-12-11 PROCEDURE — 3078F DIAST BP <80 MM HG: CPT | Performed by: PHYSICIAN ASSISTANT

## 2023-12-11 PROCEDURE — G8400 PT W/DXA NO RESULTS DOC: HCPCS | Performed by: PHYSICIAN ASSISTANT

## 2023-12-11 PROCEDURE — 3077F SYST BP >= 140 MM HG: CPT | Performed by: PHYSICIAN ASSISTANT

## 2023-12-11 PROCEDURE — 3046F HEMOGLOBIN A1C LEVEL >9.0%: CPT | Performed by: PHYSICIAN ASSISTANT

## 2023-12-11 RX ORDER — INSULIN LISPRO 100 [IU]/ML
3 INJECTION, SOLUTION INTRAVENOUS; SUBCUTANEOUS
Qty: 3 ML | Refills: 0 | Status: SHIPPED | OUTPATIENT
Start: 2023-12-11

## 2023-12-11 RX ORDER — NITROFURANTOIN 25; 75 MG/1; MG/1
100 CAPSULE ORAL 2 TIMES DAILY
Qty: 14 CAPSULE | Refills: 0 | Status: SHIPPED | OUTPATIENT
Start: 2023-12-11 | End: 2023-12-18

## 2023-12-11 NOTE — PROGRESS NOTES
Savana Goode is a 70 y.o. female who presents to the office today with the following:  Chief Complaint   Patient presents with    Follow-up     Has been sick with insulin and stopped taking them    Diabetes    Dysuria       Diabetes    Dysuria       Pt with h/o non-compliance presents for f/up poorly controlled T2DM, suppose to be insulin dep but has had issues with tolerating it in the past.  Previously refused to be put in insulin stating she was \"allergic\" when they tried her on it in the hospital.  Has been on multiple other medications for her BS. Saw Dr. Vinod Styles in Oct and says she would be willing to try insulin, put on low dose glargine to start. Pt says she has since discontinued the medication as it made her feel sick and vomit every night. Here with her daughter who confirms she vomits every night on the insulin. She tried stopping all meds except the insulin and continued to vomit every night. Resumed all others and d/c the insulin and did not vomit. A1c - 14.8 last visit in Oct.  Hemoglobin A1C   Date Value Ref Range Status   03/28/2023 >15.5 (H) 4.8 - 5.6 % Final     Comment:     **Verified by repeat analysis**           Prediabetes: 5.7 - 6.4           Diabetes: >6.4           Glycemic control for adults with diabetes: <7.0       Her daughter states she thinks she did tolerate one type of insulin from the hospital, but pt now admits \"was too angry at the time to want to continue\" which is why she says she told me she was not going to take any insulins and was \"allergic to them all\". She does not recall which insulin this was. When looking back at her chart, I explained they sent her home with glargine and lispro. Daughter thinks it was the mealtime insulin she could tolerate best.    Referred to Endo before, but claims she never got a call. Daughter would like her number in chart. Will put in referral again.   Pt says she does not want to see \"the women doctor in [place]"

## 2023-12-13 NOTE — ACP (ADVANCE CARE PLANNING)
Non-Provider Advance Care Planning (ACP) Note    Date of ACP Conversation: 10/17/2022  Persons included in Conversation: patient  Length of ACP Conversation in minutes: <16 minutes (Non-Billable)    Conversation requested by:   Provider    Authorized Decision Maker (if patient is incapable of making informed decisions):    This person is:  Next of Kin by law (only applies in absence of a Healthcare Power of  or Legal Guardian)        General ACP for ALL Patients with Decision Making Capacity:    Advance Directive Conversation with Patients who have not yet planned:  Importance of advance care planning, including choosing a healthcare agent to communicate patient's healthcare decisions if patient lost the ability to make decisions, such as after a sudden illness or accident    Review of Existing Advance Directive: (Select questions covered)  N/A    Interventions Provided:  Provided ACP educational materials:  Conversation Starter Kit  Advance Directive Form CARDIOLOGY FOLLOW UP APPOINTMENT: 1/5/2024 @2:30PM at [facility] with ADIEL Leija CARDIOLOGY FOLLOW UP APPOINTMENT: 1/5/2024 @2:30PM at [facility] with ADIEL Leija

## 2023-12-15 LAB
BACTERIA SPEC CULT: ABNORMAL
CC UR VC: ABNORMAL
SERVICE CMNT-IMP: ABNORMAL

## 2023-12-19 ENCOUNTER — TELEPHONE (OUTPATIENT)
Dept: FAMILY MEDICINE CLINIC | Age: 78
End: 2023-12-19

## 2023-12-19 DIAGNOSIS — E11.21 TYPE 2 DIABETES WITH NEPHROPATHY (HCC): ICD-10-CM

## 2023-12-19 DIAGNOSIS — E11.65 POORLY CONTROLLED DIABETES MELLITUS (HCC): ICD-10-CM

## 2023-12-19 DIAGNOSIS — Z79.4 TYPE 2 DIABETES MELLITUS WITH DIABETIC NEPHROPATHY, WITH LONG-TERM CURRENT USE OF INSULIN (HCC): ICD-10-CM

## 2023-12-19 DIAGNOSIS — E11.21 TYPE 2 DIABETES MELLITUS WITH DIABETIC NEPHROPATHY, WITH LONG-TERM CURRENT USE OF INSULIN (HCC): ICD-10-CM

## 2023-12-19 NOTE — TELEPHONE ENCOUNTER
Pt's daughter calls to state that pt has not gotten any of her insulin or her free style anita sensor.     Daughter [facility]

## 2024-01-03 RX ORDER — INSULIN LISPRO 100 [IU]/ML
3 INJECTION, SOLUTION INTRAVENOUS; SUBCUTANEOUS
Qty: 3 ML | Refills: 0 | Status: SHIPPED | OUTPATIENT
Start: 2024-01-03 | End: 2024-01-04 | Stop reason: SDUPTHER

## 2024-01-04 DIAGNOSIS — Z79.4 TYPE 2 DIABETES MELLITUS WITH DIABETIC NEPHROPATHY, WITH LONG-TERM CURRENT USE OF INSULIN (HCC): ICD-10-CM

## 2024-01-04 DIAGNOSIS — E11.65 POORLY CONTROLLED DIABETES MELLITUS (HCC): ICD-10-CM

## 2024-01-04 DIAGNOSIS — E11.21 TYPE 2 DIABETES MELLITUS WITH DIABETIC NEPHROPATHY, WITH LONG-TERM CURRENT USE OF INSULIN (HCC): ICD-10-CM

## 2024-01-04 DIAGNOSIS — E11.21 TYPE 2 DIABETES WITH NEPHROPATHY (HCC): ICD-10-CM

## 2024-01-04 RX ORDER — INSULIN LISPRO 100 [IU]/ML
3 INJECTION, SOLUTION INTRAVENOUS; SUBCUTANEOUS
Qty: 15 ML | Refills: 0 | Status: SHIPPED | OUTPATIENT
Start: 2024-01-04

## 2024-01-10 DIAGNOSIS — Z79.4 TYPE 2 DIABETES MELLITUS WITH DIABETIC NEPHROPATHY, WITH LONG-TERM CURRENT USE OF INSULIN (HCC): ICD-10-CM

## 2024-01-10 DIAGNOSIS — E11.21 TYPE 2 DIABETES MELLITUS WITH DIABETIC NEPHROPATHY, WITHOUT LONG-TERM CURRENT USE OF INSULIN (HCC): ICD-10-CM

## 2024-01-10 DIAGNOSIS — E11.21 TYPE 2 DIABETES MELLITUS WITH DIABETIC NEPHROPATHY, WITH LONG-TERM CURRENT USE OF INSULIN (HCC): ICD-10-CM

## 2024-01-10 RX ORDER — INSULIN GLARGINE 100 [IU]/ML
10 INJECTION, SOLUTION SUBCUTANEOUS NIGHTLY
Qty: 5 ADJUSTABLE DOSE PRE-FILLED PEN SYRINGE | Refills: 0 | Status: CANCELLED | OUTPATIENT
Start: 2024-01-10

## 2024-01-10 NOTE — TELEPHONE ENCOUNTER
Pt requests     Jardiance 25 mg     Lantus solostar pen    Trulicity - pt states you told her to re start Trulicity at last appointment.     [employer] mail order - Please send 90 day supplies

## 2024-01-11 ENCOUNTER — TELEPHONE (OUTPATIENT)
Dept: FAMILY MEDICINE CLINIC | Age: 79
End: 2024-01-11

## 2024-01-11 DIAGNOSIS — E11.21 TYPE 2 DIABETES MELLITUS WITH DIABETIC NEPHROPATHY, WITHOUT LONG-TERM CURRENT USE OF INSULIN (HCC): ICD-10-CM

## 2024-01-11 RX ORDER — LOSARTAN POTASSIUM 50 MG/1
50 TABLET ORAL DAILY
Qty: 90 TABLET | Refills: 1 | Status: SHIPPED | OUTPATIENT
Start: 2024-01-11

## 2024-01-11 NOTE — TELEPHONE ENCOUNTER
Pt's daughter calls to state she is taking over managing her mother's meds. She says Malorie needs a refill of Losartan 100 mg. I see there should be a refill of Losartan for a 90 day in December from [facility] pharmacy.     Pt's daughter wants all of Malorie's med to go to the local pharmacy [facility].     She states that [facility] will not transfer the prescriptions to [facility] and that pt will need new rx's sent to the [facility]    Best number

## 2024-01-11 NOTE — TELEPHONE ENCOUNTER
Spoke to the patient's daughter and she said her mom is confused  the daughter is aware she is on the humalog and jardiance but cannot take the lantus and trulicity was too expensive. She said also [facility] is the correct pharmacy her mom caled and she did not realize she will explain to her mom about the meds

## 2024-01-30 ENCOUNTER — TELEPHONE (OUTPATIENT)
Dept: FAMILY MEDICINE CLINIC | Age: 79
End: 2024-01-30

## 2024-01-30 NOTE — TELEPHONE ENCOUNTER
----- Message from Julie Behan sent at 1/30/2024 12:56 PM EST -----  Subject: Message to Provider    QUESTIONS  Information for Provider? [facility] is sending fax that   patient requesting a freestyle anita and they need the provider signature   and then return it to them. Any questions call [phone] ext [phone]  ---------------------------------------------------------------------------  --------------  CALL BACK INFO  [phone]; OK to leave message on voicemail  ---------------------------------------------------------------------------  --------------  SCRIPT ANSWERS  Relationship to Patient? Covered Entity  Covered Entity Type? Durable Medical Equipment?  Representative Name? Bhavani

## 2024-02-01 NOTE — TELEPHONE ENCOUNTER
I have called and talked to Tere (patient's daughter).  Per TEO Pineda:    Has pt seen Endo yet? I received the fax but it wants specific information in the last two notes we do not have bc pt did not return with BS readings..etc. may need another apt to get appropriate documentation. Pt needs to provide BS log at next visit as well. Would be helpful if daughter came.   Tere verbalizes understanding.  She tells me that the appointment with Endocrinology is on May 30th 2024.  She will talk to her mom about making sure to document her BS readings and bring them to the appointment.

## 2024-03-15 ENCOUNTER — TELEPHONE (OUTPATIENT)
Dept: FAMILY MEDICINE CLINIC | Age: 79
End: 2024-03-15